# Patient Record
Sex: FEMALE | Race: WHITE | NOT HISPANIC OR LATINO | Employment: FULL TIME | URBAN - METROPOLITAN AREA
[De-identification: names, ages, dates, MRNs, and addresses within clinical notes are randomized per-mention and may not be internally consistent; named-entity substitution may affect disease eponyms.]

---

## 2017-04-25 ENCOUNTER — OFFICE VISIT (OUTPATIENT)
Dept: PHYSICAL MEDICINE AND REHAB | Facility: CLINIC | Age: 37
End: 2017-04-25
Payer: COMMERCIAL

## 2017-04-25 VITALS
HEART RATE: 88 BPM | BODY MASS INDEX: 36.65 KG/M2 | SYSTOLIC BLOOD PRESSURE: 156 MMHG | HEIGHT: 65 IN | WEIGHT: 220 LBS | RESPIRATION RATE: 18 BRPM | DIASTOLIC BLOOD PRESSURE: 101 MMHG

## 2017-04-25 DIAGNOSIS — R29.898 ARM WEAKNESS: ICD-10-CM

## 2017-04-25 DIAGNOSIS — M79.18 MYOFASCIAL PAIN: Primary | ICD-10-CM

## 2017-04-25 PROBLEM — Z86.69 HISTORY OF MIGRAINE HEADACHES: Status: ACTIVE | Noted: 2017-04-25

## 2017-04-25 PROBLEM — E66.9 OBESITY (BMI 30-39.9): Status: ACTIVE | Noted: 2017-04-25

## 2017-04-25 PROBLEM — K58.9 IBS (IRRITABLE BOWEL SYNDROME): Status: ACTIVE | Noted: 2017-04-25

## 2017-04-25 PROCEDURE — 99204 OFFICE O/P NEW MOD 45 MIN: CPT | Mod: S$GLB,,, | Performed by: PHYSICAL MEDICINE & REHABILITATION

## 2017-04-25 PROCEDURE — 99999 PR PBB SHADOW E&M-NEW PATIENT-LVL III: CPT | Mod: PBBFAC,,, | Performed by: PHYSICAL MEDICINE & REHABILITATION

## 2017-04-25 PROCEDURE — 1160F RVW MEDS BY RX/DR IN RCRD: CPT | Mod: S$GLB,,, | Performed by: PHYSICAL MEDICINE & REHABILITATION

## 2017-04-25 RX ORDER — TIZANIDINE 2 MG/1
2-4 TABLET ORAL NIGHTLY PRN
Qty: 60 TABLET | Refills: 1 | Status: SHIPPED | OUTPATIENT
Start: 2017-04-25 | End: 2017-06-01

## 2017-04-25 NOTE — MR AVS SNAPSHOT
[facility]  [address] [address]  Phone: [phone]  Fax: [phone]                  Niesha Hilton   2017 12:20 PM   Office Visit    Description:  Female : 1980   Provider:  Adriana Burgos MD   Department:  [facility] - Physiatry           Reason for Visit     Back Pain     Shoulder Pain           Diagnoses this Visit        Comments    Myofascial pain    -  Primary     Arm weakness                To Do List           Goals (5 Years of Data)     None      Follow-Up and Disposition     Return if symptoms worsen or fail to improve.       These Medications        Disp Refills Start End    tizanidine (ZANAFLEX) 2 MG tablet 60 tablet 1 2017     Take 1-2 tablets (2-4 mg total) by mouth nightly as needed. - Oral    Pharmacy: [facility] [address] [place] [address] [place], [place] - [address] AT [facility] #: [phone]         [facility]     [facility] Care [facility] -  Assistance  Unless otherwise directed by your provider, please contact [facility], our nurse care line that is available for  assistance.     Registered nurses in the [facility] provide: appointment scheduling, clinical advisement, health education, and other advisory services.  Call: [phone] (toll free)               Medications           Message regarding Medications     Verify the changes and/or additions to your medication regime listed below are the same as discussed with your clinician today.  If any of these changes or additions are incorrect, please notify your healthcare provider.        START taking these NEW medications        Refills    tizanidine (ZANAFLEX) 2 MG tablet 1    Sig: Take 1-2 tablets (2-4 mg total) by mouth nightly as needed.    Class: Normal    Route: Oral           Verify that the below list of medications is an accurate representation of the medications you are currently taking.  If none reported, the list may be blank.  "If incorrect, please contact your healthcare provider. Carry this list with you in case of emergency.           Current Medications     tizanidine (ZANAFLEX) 2 MG tablet Take 1-2 tablets (2-4 mg total) by mouth nightly as needed.           Clinical Reference Information           Your Vitals Were     Resp Height Weight BMI       18 5' 5" (1.651 m) 99.8 kg (220 lb) 36.61 kg/m2       Allergies as of 4/25/2017     No Known Allergies      Immunizations Administered on Date of Encounter - 4/25/2017     None      Orders Placed During Today's Visit      Normal Orders This Visit    Ambulatory Referral to Physical/Occupational Therapy       [facility] Sign[facility]Up     Activating your [facility] account is as easy as 1-2-3!     1) Visit [email or web address], select Sign Up Now, enter this activation code and your date of birth, then select Next.  [email or web address]  Expires: 6/9/2017 12:03 PM      2) Create a username and password to use when you visit [facility] in the future and select a security question in case you lose your password and select Next.    3) Enter your e-mail address and click Sign Up!    Additional Information  If you have questions, please e-mail [email or web address] or call [phone] to talk to our [facility] staff. Remember, [facility] is NOT to be used for urgent needs. For medical emergencies, dial 911.         Instructions      Myofascial Pain Syndrome: Fibrositis  Your pain is caused by a state of chronic muscle tension. This condition is called by various names: myofascial pain, fibrositis and trigger point pain. This can also be due to mechanical stress (such as working at a computer terminal for long periods; or work that requires repetitive motions of the arms or hands) or emotional stress (such as problems on the job or in your personal life). Sometimes there is no obvious cause. The pain can occur in the area of the muscle spasm or at a site distant to it. For example, spasm of a neck muscle can cause " headache. Spasm of the muscle near the shoulder blade can cause pain shooting down the arm.  Home Care:  · Try to identify the factors that may be causing your problem and change them:  ¨ If you feel that emotional stress is a cause of your pain, learn methods to deal more effectively with the stress in your life. These may include regular exercise, muscle relaxation techniques, meditation or simply taking time out for yourself. Consult your doctor or go to a local bookstore and review the many books and tapes available on the subject of stress reduction.  ¨ If you feel that physical stress is a cause for your pain, try to modify any poor work habits.  · You may use acetaminophen (Tylenol) or ibuprofen (Motrin, Advil) to control pain, unless another medicine was prescribed. [NOTE: If you have chronic liver or kidney disease or ever had a stomach ulcer or GI bleeding, talk with your doctor before using these medicines.]  · The use of heat to the muscle (hot compress or heating pad) will be helpful to reduce muscle spasm. Some persons get relief with ice packs. Apply an ice pack (crushed or cubed ice in a plastic bag, wrapped in a towel) for 20 minutes at a time as needed. Use the method that feels best to you.  · Massaging the trigger point and stretching out the muscle are an important parts of prevention and treatment. Trigger point massage can be done by first applying heat to the area to warm and prepare the muscle. Have someone apply steady thumb pressure directly on the knot in the muscle (the most tender point) for 30 seconds. Release the pressure, then massage the surrounding muscle. Repeat the process, applying more pressure to the trigger point each time. Do this up to the limit of pain. With each treatment, the trigger point should become less tender and the pain should decrease. You can apply local pressure to trigger points in the back by lying on the floor with a tennis ball under the trigger  point.  Follow Up  with your doctor as advised or if not improving within the next week. It may be necessary for you to receive physical therapy if you do not respond to home treatment alone.  Get Prompt Medical Attention  if any of the following occur:  · If your trigger point is in the chest muscles, observe for pain that becomes more severe, lasts longer, or spreads into your shoulder/arm, neck or back; you develop trouble breathing, sweating, nausea or vomiting in association with chest pain  · If you develop weakness or numbness in an extremity  · If your pain worsens, regardless of its location  © 2420-4293 [employer]. [address] [place] [address]. All rights reserved. This information is not intended as a substitute for professional medical care. Always follow your healthcare professional's instructions.          Trigger Point Injection  The cause of your muscle pain or spasms may be one or more trigger points. Your health care provider may decide to inject the painful spots to relax the muscle. This can help relieve your pain. Relaxing the muscle can also make movement easier. You may then be able to exercise to strengthen the muscle and help it heal.    What is a trigger point?  A trigger point is a tight, painful knot of muscle fiber. It can form where a muscle is strained or injured. The knot can sometimes be felt under the skin. A trigger point is very tender to the touch. Pain may also spread to other parts of the affected muscle. Muscles around a knee, shoulder blade, or other bones are prone to trigger points. This is because these muscles are more likely to be injured.    About the injections  Any muscle in the body can have one or more trigger points. Several injections may be needed in each trigger point to best relieve pain. These injections may be given in sessions about 2 weeks apart, depending on the preference of your health care provider. In some cases, you may  not feel much change in your symptoms until after the third injection.     © 2520-5201 [employer] [address] [place] [address]. All rights reserved. This information is not intended as a substitute for professional medical care. Always follow your healthcare professional's instructions.            Your Neck Muscles  The muscles in the neck and shoulders need to be strong to hold the neck and head in place. These muscles also help move the neck and shoulders. Your health care provider can recommend exercises to help stretch and strengthen your neck muscles.    © 0476-2527 [employer]. [address] [place] [address]. All rights reserved. This information is not intended as a substitute for professional medical care. Always follow your healthcare professional's instructions.          Neck Problems: Relieving Your Symptoms  The first goal of treatment is to relieve your symptoms. Your health care provider may recommend self-care treatments. These include resting, applying ice and heat, taking medication, and doing exercises. Your health care provider may also recommend that you see a physical therapist, who can teach you ways to care for and strengthen your neck.    Self-Care Treatments  Pain can end quickly or last awhile. Either way, youll want relief as soon as possible. Your health care provider can tell you which treatments to do at home to help relieve your pain.  · Lying down for a short time takes pressure from the head off the neck.  · Ice and heat can help reduce pain. To bring down swelling, rest an ice pack wrapped in a thin towel on your neck for 15 minutes. To relax sore muscles, apply a warm, wet towel to the area. Or take a warm bath or shower.  · Over-the-counter medications, such as ibuprofen, naproxen, and aspirin, can help reduce pain and swelling. Acetaminophen can help relieve pain. Use these only as directed.  · Exercises can relax muscles and  prevent stiffness. To prepare, drape a warm, wet towel around your neck and shoulders for 5 minutes. Remove the towel. Then do any exercises recommended to you by your health care provider.  Physical Therapy  If self-care treatments arent helping relieve neck pain, your health care provider may suggest one or more sessions of physical therapy. Physical therapy is performed by a specialist trained to treat injuries. Your physical therapist (PT) will teach you how to strengthen muscles, improve the spines alignment, and help you move properly. Treatment methods used in physical therapy may include:  · Heat. A special heating pad called a neck pack may be applied to your neck.  · Exercises. Your PT will teach you exercises to help strengthen your neck and improve its range of motion.  · Joint mobilization. The PT gently moves your vertebrae to help restore motion in your neck joints and reduce neck pain.  · Soft tissue mobilization. The PT massages and stretches the muscles in your neck and shoulders.  · Electrical stimulation. Electrical impulses are sent into your neck. This helps reduce soreness and inflammation.  · Education in body mechanics. The PT shows you ways to position and move your body that protect the neck.  Other Treatments  If physical therapy doesnt relieve your neck pain, your health care provider may suggest other treatments. For example, medications or injections can help relieve pain and swelling. In some cases, surgery may be needed to treat neck problems.  © 7311-2769 [employer]. [address] [place] [address], [place] [address]. All rights reserved. This information is not intended as a substitute for professional medical care. Always follow your healthcare professional's instructions.          Understanding Neck Problems       If you suffer from neck pain, youre not alone. Many people have neck pain at some point in their lives. Problems such as poor posture, injury, and wear and  tear can lead to neck pain. Your health care provider will work with you to find the treatment thats best for your neck.  Types of Neck Problems  The following problems can cause pain or injury in your neck:  · Strains and sprains: Strains (stretched or torn muscles) and sprains (stretched or torn ligaments) can cause neck pain. Strains and sprains can occur during an accident, or when you overuse your neck through repetitive motion. They can also cause your muscles and ligaments to become inflamed (swollen and painful).  · Whiplash and other injuries: Whiplash can result when an impact throws your head, forcing your neck too far forward (hyperflexion), then too far backward (hyperextension). When combined, the two motions can cause a painful injury to different parts of your neck, such as muscles, ligaments, or joints. The most common cause of whiplash is a car accident. But it can also happen during a fall or sports injury.  · Weakened disks: A simple action, such as a sneeze or a cough, can cause one of your disks to bulge (herniate). A herniated disk can put pressure on your nerve and cause pain. Over time, disks can also thin out (degenerate). Flattened disks dont cushion vertebrae well and can cause vertebrae to rub together. Rubbing vertebrae can pinch nerves and cause pain.  · Weakened joints: Aging and injury can cause joints to slowly degenerate. Thinned joints can also cause vertebrae to rub together. This can cause abnormal growths of bone (bone spurs) to form on vertebrae. Bone spurs put pressure on nerves, causing pain.  Common Symptoms  If you have a neck problem, you may have one or more of the following symptoms:  · Muscle tension and spasm: You may not be able to move your neck, arms, or shoulders comfortably if you have muscle tension or stiffness in your neck. If your symptoms arent relieved, you may experience muscle spasms, or knots of contracted tissue (trigger points) in areas of your neck  and shoulders.  · Aches and pains: Dull aches in your head or neck, sharp pains, and swelling of the soft tissue of your neck and shoulders are common symptoms. If theres pressure on the nerves in your neck, you may feel pain in your arms or hands (referred pain).  · Numbness or weakness: If you injure the nerves in your neck, you may experience numbness, tingling, or weakness in your shoulders, arms, or hands. These symptoms arise when disks or bone spurs press on the nerves in your neck.  © 3836-8438 [employer]. [address] [place] [address], [place] [address]. All rights reserved. This information is not intended as a substitute for professional medical care. Always follow your healthcare professional's instructions.          Neck Spasm [No Trauma]    Spasm of the neck muscles can occur after a sudden awkward neck movement. Sleeping with your neck in a crooked position can also cause spasm. Some persons respond to emotional stress by tensing the muscles of their neck, shoulders and upper back. If neck spasm lasts long enough, it can cause headache.  The treatment described below will usually help the pain to go away in 5-7 days. Pain that continues may require further evaluation or other types of treatment such as physical therapy.  Home Care:  1. Rest and relax the muscles. Use a comfortable pillow that supports the head and keeps the spine in a neutral position. The position of the head should not be tilted forward or backward. A rolled up towel may help for a custom fit.  2. Some persons find relief with heat (hot shower, hot bath or heating pad) and massage, while others prefer cold packs (crushed or cubed ice in a plastic bag, wrapped in a towel). Try both and use the method that feels best for 20 minutes several times a day.  3. You may use acetaminophen (Tylenol) or ibuprofen (Motrin, Advil) to control pain, unless another medicine was prescribed. [NOTE: If you have chronic liver or kidney disease  or ever had a stomach ulcer or GI bleeding, talk with your doctor before using these medicines.]  Follow Up  with your doctor or this facility if your symptoms do not show signs of improvement after one week. Physical therapy or further evaluation may be needed.  [NOTE: If x-rays were taken, they will be reviewed by a radiologist. You will be notified of any new findings that may affect your care.]  Return Promptly  or contact your doctor if any of the following occurs:  · Pain becomes worse or spreads into one or both arms  · Weakness or numbness in one or both arms  · Increasing headache with nausea or vomiting  · Fever over 100.4ºF (38.0ºC)  © 7761-6271 [employer]. [address] [place] [address]. All rights reserved. This information is not intended as a substitute for professional medical care. Always follow your healthcare professional's instructions.          Know Your Neck: The Cervical Spine  By learning about the parts of the neck, you can better understand your neck problem. The bones of the neck are called cervical vertebrae, commonly identified as C1 through C7. Together, they form a bony column called the spine. Vertebrae also protect the spinal cord, a pathway for messages to reach the brain. Surrounding the spine are soft tissues such as muscles, tendons, and nerves.        Flexibility Is Key  For the neck to function normally, it has to be flexible enough to move without discomfort. A healthy neck can move easily in six different directions.    © 1486-1097 [employer]. [address] [place] [address]. All rights reserved. This information is not intended as a substitute for professional medical care. Always follow your healthcare professional's instructions.          Protecting Your Neck: Posture and Body Mechanics  Protecting your neck from injuries and pain involves practicing good posture and body mechanics. This may mean correcting bad habits you  have related to the way you hold and move your body. The tips below can help you improve your posture and body mechanics.    What Is Posture and Why Does It Matter?  Posture is the way you hold your body. For many of us, this means hunching over, thrusting the chin forward, and slouching the shoulders. But this kind of poor posture keeps muscles from properly supporting the neck and puts stress on muscles, disks, ligaments, and joints in your neck. As a result, injury and pain can occur.  How Is Your Posture?  Use a full-length mirror to check your posture. To begin, stand normally. Then slowly back up against a wall. Is there space between your head and the wall? Do you slouch your shoulders? Is your chin pointing up or down? All these can cause neck pain and injury.  Improving Your Posture  Follow these steps to improve your posture:  · Pull your shoulders back.  · Think of the ears, shoulders, and hips as a series of dots. Now, adjust your body to connect the dots in a straight line.  · Keep your chin level.  What Are Body Mechanics and Why Do They Matter?  The way you move and position your body during daily activities is called body mechanics. Good body mechanics help protect the neck. This means learning the right ways to stand, sit, and even sleep. So do whats best for your neck and practice good body mechanics.  Standing   To protect your neck while standing:  · Carry objects close to your body.  · Keep your ears and shoulders in a line while standing or walking.  · To lower yourself, bend at the knees with a straight back. Do this instead of looking down and reaching for objects.  · Work at eye level. Dont reach above your head or tilt your head back.  Sitting   To protect your neck while sitting:  · Set up your workstation so your monitor is at eye level. Also, use a document zimmerman when viewing papers or books.  · Keep your knees at or slightly below the level of your hips.  · Sit up straight, with feet  flat on the floor. If your feet dont touch the floor, use a footrest.  · Avoid sitting or driving for long periods. Take frequent breaks.  Sleeping   To protect your neck while sleeping:  · Sleep on your back with a pillow under your knees, or on your side with a pillow between bent knees. This helps align the spine.  · Avoid using pillows that are too high or too low. Instead, use a neck roll or pillow under your neck while you sleep to keep the neck straight.  · Sleep on a mattress that supports you, with a pillow under your neck.  © 8854-3980 [employer]. [address] [place] [address], [place] [address]. All rights reserved. This information is not intended as a substitute for professional medical care. Always follow your healthcare professional's instructions.          Exercises at Your Workstation: Eyes, Neck, and Head     Tired eyes? Stiff neck? A few easy moves can help prevent these kinds of problems. Take a few minutes during your day to do these exercises--right at your desk. They'll loosen up your muscles, keep you more alert, and make a big difference in how you work and feel.    For your eyes  Eye cup  · Lean forward with your elbows on your desk.  · Cup your hands and place them lightly over your closed eyes. Hold for a minute, while breathing deeply in and out.  · Slowly uncover and open your eyes. Repeat 2 times.  Eye roll  · Close your eyes. Slowly roll your eyeballs clockwise all the way around. Repeat 3 times.  · Now slowly roll them all the way around counterclockwise. Repeat 3 times.  Eye rest  · Every 20 minutes, look away from the computer screen. Focus on an object at least 20 feet away. Stay focused on this object for a full 20 seconds.    For your neck and head  Warm-up  · Drop your head gently to your chest. While breathing in, slowly roll your head up to your left shoulder. While breathing out, slowly roll your head back to center. Repeat to the right.  · Repeat 3 times on each  side.  Head tilt  · Sit up straight. Tuck in your chin.  · Slowly tip your head to the left. Return to the center. Then, tip your head to the right.  · Repeat 3 times on each side.    Head turn  · Sit up straight.  · Slowly turn your head and look over your left shoulder. Hold for a few seconds. Go back to the center, then repeat to your right.  · Repeat 3 times on each side.  © 8895-0540 [employer]. [address] [place] [address]. All rights reserved. This information is not intended as a substitute for professional medical care. Always follow your healthcare professional's instructions.          Reach and Hold Exercise    Do this exercise on your hands and knees. Keep your knees under your hips and your hands under your shoulders. Keep your spine in a neutral position (not arched or sagging). Keep your ears in line with your shoulders. Hold for a few seconds before starting the exercise:  4. Tighten your abdominal muscles and raise one arm straight in front of you, palm down. Hold for 5 seconds, then lower. Repeat 5 times.  5. Do the exercise again, this time lifting your arm to the side. Repeat 5 times.  6. Do the exercise again, this time lifting your arm backward, palm up. Repeat 5 times.  Switch sides and do each exercise with the other arm.  © 2014-3584 [employer]. [address] [place] [address]. All rights reserved. This information is not intended as a substitute for professional medical care. Always follow your healthcare professional's instructions.        Shoulder and Upper Back Stretch  To start, stand tall with your ears, shoulders, and hips in line. Your feet should be slightly apart, positioned just under your hips. Focus your eyes directly in front of you.  this position for a few seconds before starting your exercise. This helps increase your awareness of proper posture.  Reach overhead and slightly back with both arms. Keep your shoulders  and neck aligned and your elbows behind your shoulders:  · With your palms facing the ceiling, turn your fingers inward.  · Take a deep breath. Breathe out, and lower your elbows toward your buttocks. Hold for 5 seconds, then return to starting position.  · Repeat 3 times.    © 1693-2204 [employer]. [address] [place] [address]. All rights reserved. This information is not intended as a substitute for professional medical care. Always follow your healthcare professional's instructions.          Shoulder Clock Exercise  To start, stand tall with your ears, shoulders, and hips in line. Your feet should be slightly apart, positioned just under your hips. Focus your eyes directly in front of you.  this position for a few seconds before starting your exercise. This helps increase your awareness of proper posture.  · Imagine that your right shoulder is the center of a clock. With the outer point of your shoulder, roll it around to slowly trace the outer edge of the clock.  · Move clockwise first, then counterclockwise.  · Repeat 3 times. Switch shoulders.   © 3649-1853 [employer]. [address] [place] [address]. All rights reserved. This information is not intended as a substitute for professional medical care. Always follow your healthcare professional's instructions.          Shoulder Girdle Stretch     To start, sit in a chair with your feet flat on the floor. Your weight should be slightly forward so that youre balanced evenly on your buttocks. Relax your shoulders and keep your head level. Using a chair with arms may help you keep your balance:  · Place 1 hand on the outside elbow of the other arm.  · Pull the arm across your body. Hold for 30 to 60 seconds. Repeat once.  · Switch sides.    © 6304-4979 [employer]. [address] [place] [address]. All rights reserved. This information is not intended as a substitute for  professional medical care. Always follow your healthcare professional's instructions.          Shoulder Exercises      To start, sit in a chair with your feet flat on the floor. Your weight should be slightly forward so that youre balanced evenly on your buttocks. Relax your shoulders and keep your head level. Avoid arching your back or rounding your shoulders. Using a chair with arms may help you keep your balance.  · Raise your arms, elbows bent, to shoulder height.  · Slowly move your forearms together. Hold for 5 seconds.  · Return to starting position. Repeat 5 times.  © 4076-6392 [employer]. [address] [place] [address]. All rights reserved. This information is not intended as a substitute for professional medical care. Always follow your healthcare professional's instructions.        Shoulder Shrug Exercise  To start, sit in a chair with your feet flat on the floor. Shift your weight slightly forward to avoid rounding your back. Relax. Keep your ears, shoulders, and hips aligned:  · Raise both of your shoulders as high as you can, as if you were trying to touch them to your ears. Keep your head and neck still and relaxed.  · Hold for a count of 10. Release.  · Repeat 5 times.    © 0626-2480 [employer]. [address] [place] [address]. All rights reserved. This information is not intended as a substitute for professional medical care. Always follow your healthcare professional's instructions.          Shoulder Squeeze Exercise     To start, sit in a chair with your feet flat on the floor. Shift your weight slightly forward to avoid rounding your back. Relax. Keep your ears, shoulders, and hips aligned:  · Raise your arms to shoulder height, elbows bent and palms forward.  · Move your arms back, squeezing your shoulder blades together.  · Hold for 10 seconds. Return to starting position.   · Repeat 5 times.     © 2000-2015 [employer]. [facility] [place] [address]. All rights reserved. This information is not intended as a substitute for professional medical care. Always follow your healthcare professional's instructions.        Weight Management: Exercise and Activity  Studies show that people who exercise are the most likely to lose weight and keep it off. Exercise burns calories. It helps build muscle to make your body stronger. Make exercise an important part of your weight-management plan.    Make activity part of your day  You may not think you have the time to exercise. But you can work activity into your daily life--you just need to be committed. Take 10 minutes out of your lunch hour to take a walk. Walk to the [employer] to get your paper instead of having it delivered. Make it a habit to take the stairs instead of the elevator. Park in a far away parking spot instead of the closest. Youll be surprised at how fast these little changes can make a difference.  Some people really cannot walk very far, and tire out quickly with exercise. Instead of becoming discouraged, resolve to do what you can do, and work to make that a regular frequent habit.   The benefits of exercise  Exercise offers many benefits including:   · Exercise increases your metabolism (the speed at which your body burns calories).  · Regular exercise can increase the amount of muscle in your body. Muscle burns calories faster than fat. The more muscle you have, the more calories you burn.  · Exercise gives you energy and curbs your appetite.  · Exercise decreases stress and helps you sleep better.  Make exercise fun  Exercise can be fun. Choose an activity you enjoy. You may even get a friend to do it with you:  · Take a resistance-training or aerobics class  · Join a team sport  · Take a dance class  · Walk the dog  · Ride a bike  If you have health problems, be sure to ask your healthcare provider before you start an exercise program. Have a   help you develop a plan thats safe for you.   Date Last Reviewed: 2/4/2016  © 9300-0401 [employer]. [address] [place] [address], [place] [address]. All rights reserved. This information is not intended as a substitute for professional medical care. Always follow your healthcare professional's instructions.        Weight Management: Healthy Eating  Food is your bodys fuel. You cant live without it. The key is to give your body enough nutrients and energy without eating too much. Reading food labels can help you make healthy choices. Also, learn new eating habits to manage your weight.     All the values on the label are based on one serving. The serving size is the average portion. Remember to multiply the values on the label by the number of servings you eat.   Eat less fat  A gram of fat has almost 2.5 times the calories of a gram of protein or carbohydrates. Try to balance your food choices so that only 20% to 35% of your calories comes from total fat. This means an average of 2½ to 3½ grams of fat for each 100 calories you eat.  Eat more fiber  High-fiber foods are digested more slowly than low-fiber foods, so you feel full longer. Try to get at least 25 grams of fiber each day for a 2000 calorie diet. Foods high in fiber include:  · Vegetables and fruits  · Whole-grain or bran breads, pastas, and cereals  · Legumes (beans) and peas  As you begin to eat more fiber, be sure to drink plenty of water to keep your digestive system working smoothly.  Tips  Do's and don'ts include:   · Dont skip meals. This often leads to overeating later on. Its best to spread your eating throughout the day.  · Eat a variety of foods, not just a few favorites.  · If you find yourself eating when youre not hungry, ask yourself why. Many of us eat when were bored, stressed, or just to be polite. Listen to your body. If youre not hungry, get busy doing something else instead of eating.  · Eat slower, shooting for 20 to  30 minutes for each meal. It takes 20 minutes for your stomach to tell your brain that its full. Slow eaters tend to eat less and are still satisfied, while fast eaters may tend to be overeaters.   · Pay attention to what you eat. Dont read or watch TV during your meal.  Date Last Reviewed: 1/31/2016 © 2000-2016 [employer]. [address] [place] [address], [place] [address]. All rights reserved. This information is not intended as a substitute for professional medical care. Always follow your healthcare professional's instructions.        Weight Management: Overcoming Your Barriers  You may have many reasons why youre not ready to lose weight. You may not feel you have the time or the skills. You may be afraid of losing weight and gaining it back again. Well, you can lose weight. And you can keep the weight off, if you make changes slowly and stick with them. Remember that you may never find the perfect time to lose weight. Decide that the right time to be healthier is now.    Common barriers  Barrier 1: I dont want to deny myself.  Barrier Buster: You dont have to! Moderation is the key:  · Watch portion sizes and know when you're eating more than one serving.  · Plan to ask for a doggy bag when you eat out.  · Have just one.  · Choose lower-fat and lower-calorie versions of your favorites.  · Use a small plate instead of a normal-sized plate.   Barrier 2: I lost weight before but I gained it right back.  Barrier Buster: Make this time different:  · List what worked and didnt work last time and what you can try this time.  · Choose changes that you are willing to stick with.  · Work exercise into your weight-loss plan.  · Be realistic about what is possible. Your plan has to fit into your life in a balanced way that works for you.   Barrier 3: I dont have the time to be active.  Barrier Buster: It takes just a few minutes a day!  · Be active with a pet or the kids.  · Block off activity time in your  schedule.  · Borrow some time that you usually spend watching TV.  · You are too important not to take time to exercise--it is your life!   Feel good about yourself  Do you eat more because you feel bad about yourself, then feel even worse as you gain weight? This is a vicious cycle. Breaking this cycle is not easy. You may need group support or counseling. Always remember that you are a valuable person, no matter what size or shape you are.  Do you have a health problem? If so, dont use it as an excuse for not losing weight. Ask your healthcare provider or dietitian about methods to lose weight that are safe for you. For example, even if you have severe arthritis, it may be easier for you to exercise in a pool. Get advice from a .    Date Last Reviewed: 2/2/2016 © 2000-2016 [employer]. [address] [place] [address], [place] [address]. All rights reserved. This information is not intended as a substitute for professional medical care. Always follow your healthcare professional's instructions.        Weight Management: Getting Started  Healthy bodies come in all shapes and sizes. Not all bodies are made to be thin. For some people, a healthy weight is higher than the average weight listed on weight charts. Your healthcare provider can help you decide on a healthy weight for you.    Reasons to lose weight  Losing weight can help with some health problems, such as high blood pressure, heart disease, diabetes, sleep apnea, and arthritis. You may also feel more energy.  Set your long-term goal  Your goal doesn't even have to be a specific weight. You may decide on a fitness goal (such as being able to walk 10 miles a week), or a health goal (such as lowering your blood pressure). Choose a goal that is measurable and reasonable, so you know when you've reached it. A goal of reaching a BMI of less than 25 is not always reasonable (or possible).   Make an action plan  Habits dont change  overnight. Setting your goals too high can leave you feeling discouraged if you cant reach them. Be realistic. Choose one or two small changes you can make now. Set an action plan for how you are going to make these changes. When you can stick to this plan, keep making a few more small changes. Taking small steps will help you stay on the path to success.  Track your progress  Write down your goals. Then, keep a daily record of your progress. Write down what you eat and how active you are. This record lets you look back on how much youve done. It may also help when youre feeling frustrated. Reward yourself for success. Even if you dont reach every goal, give yourself credit for what you do get done.  Get support  Encouragement from others can help make losing weight easier. Ask your family members and friends for support. They may even want to join you. Also look to your healthcare provider, registered dietitian, and  for help. Your local hospital can give you more information about nutrition, exercise, and weight loss.  Date Last Reviewed: 1/31/2016  © 9012-2322 [employer]. [address] [place] [address]. All rights reserved. This information is not intended as a substitute for professional medical care. Always follow your healthcare professional's instructions.        Weight Management: Fact and Fiction  Knowing the truth about losing weight can help you separate what works from what doesnt. Dont be taken in by expensive weight-loss fads like pills, herbs, and special foods that promise unbelievable results. Theres no magic way to lose weight. If you have questions about weight loss, ask your healthcare provider.    Fiction: The faster I lose weight, the better.  Fact: Rapid weight loss is usually due to loss of water or muscle mass. What youre trying to get rid of is extra fat. Aim to lose a 1/2 pound to 2 pounds a week. Then youre more likely to lose fat  rather than water or muscle.  Fiction: Skipping meals will help me lose weight.  Fact: When you skip meals, you dont give your body the energy it needs to work. Hunger makes you more likely to overeat later on. Its best to spread your meals throughout the day. Eat at least three meals a day.  Fiction: I cant start exercising until I lose weight.  Fact: The sooner you start exercising the better. Exercise helps burn more calories, tone your muscles, and keep your appetite in check. People who continue to exercise after they lose weight are more likely to keep the weight off.  Fiction: The fewer calories I eat, the better.  Fact: This seems like it should be true, but its not. When you eat too few calories, your body acts as if its on a desert island. It thinks food is scarce, so it slows down your metabolism (how fast you burn calories) to save energy. By eating too few calories, you make it harder to lose weight.  Fiction: Once I lose weight, I can go back to living the way I did before.  Fact: Going back to your old eating habits and giving up exercise is a sure way to regain any weight youve lost. The lifestyle changes that help you lose extra weight can also help keep it off. This is why you need to make realistic changes you can stick with.  Fiction: Low-fat and fat-free mean low-calorie.  Fact: All foods, even fat-free ones, have calories. Eat too many calories and youll gain weight. Its OK to treat yourself to a fat-free cookie or two. Just dont eat the whole box! A dietitian will help you figure this out, and will likely recommend that you eat three meals a day, with protein with each meal.   Date Last Reviewed: 2/4/2016  © 6445-0786 [employer]. [address] [place] [address], [place] [address]. All rights reserved. This information is not intended as a substitute for professional medical care. Always follow your healthcare professional's instructions.             Smoking Cessation      If you would like to quit smoking:   You may be eligible for free services if you are a [place] resident and started smoking cigarettes before September 1, 1988.  Call the Smoking Cessation [employer] (SCT) toll free at (088) 028-8936 or (913) 017-2709.   Call 1-800-QUIT-NOW if you do not meet the above criteria.   Contact us via email: [email or web address]   View our website for more information: [email or web address]        Language Assistance Services     ATTENTION: Language assistance services are available, free of charge. Please call [phone].      ATENCIÓN: Si habla español, tiene a candelario disposición servicios gratuitos de asistencia lingüística. Llame al [phone].     CHÚ Ý: N?u b?n nói Ti?ng Vi?t, có các d?ch v? h? tr? ngôn ng? mi?n phí dành cho b?n. G?i s? [phone].         [facility] complies with applicable Federal civil rights laws and does not discriminate on the basis of race, color, national origin, age, disability, or sex.

## 2017-04-25 NOTE — PATIENT INSTRUCTIONS
Myofascial Pain Syndrome: Fibrositis  Your pain is caused by a state of chronic muscle tension. This condition is called by various names: myofascial pain, fibrositis and trigger point pain. This can also be due to mechanical stress (such as working at a computer terminal for long periods; or work that requires repetitive motions of the arms or hands) or emotional stress (such as problems on the job or in your personal life). Sometimes there is no obvious cause. The pain can occur in the area of the muscle spasm or at a site distant to it. For example, spasm of a neck muscle can cause headache. Spasm of the muscle near the shoulder blade can cause pain shooting down the arm.  Home Care:  · Try to identify the factors that may be causing your problem and change them:  ¨ If you feel that emotional stress is a cause of your pain, learn methods to deal more effectively with the stress in your life. These may include regular exercise, muscle relaxation techniques, meditation or simply taking time out for yourself. Consult your doctor or go to a local bookstore and review the many books and tapes available on the subject of stress reduction.  ¨ If you feel that physical stress is a cause for your pain, try to modify any poor work habits.  · You may use acetaminophen (Tylenol) or ibuprofen (Motrin, Advil) to control pain, unless another medicine was prescribed. [NOTE: If you have chronic liver or kidney disease or ever had a stomach ulcer or GI bleeding, talk with your doctor before using these medicines.]  · The use of heat to the muscle (hot compress or heating pad) will be helpful to reduce muscle spasm. Some persons get relief with ice packs. Apply an ice pack (crushed or cubed ice in a plastic bag, wrapped in a towel) for 20 minutes at a time as needed. Use the method that feels best to you.  · Massaging the trigger point and stretching out the muscle are an important parts of prevention and treatment. Trigger point  massage can be done by first applying heat to the area to warm and prepare the muscle. Have someone apply steady thumb pressure directly on the knot in the muscle (the most tender point) for 30 seconds. Release the pressure, then massage the surrounding muscle. Repeat the process, applying more pressure to the trigger point each time. Do this up to the limit of pain. With each treatment, the trigger point should become less tender and the pain should decrease. You can apply local pressure to trigger points in the back by lying on the floor with a tennis ball under the trigger point.  Follow Up  with your doctor as advised or if not improving within the next week. It may be necessary for you to receive physical therapy if you do not respond to home treatment alone.  Get Prompt Medical Attention  if any of the following occur:  · If your trigger point is in the chest muscles, observe for pain that becomes more severe, lasts longer, or spreads into your shoulder/arm, neck or back; you develop trouble breathing, sweating, nausea or vomiting in association with chest pain  · If you develop weakness or numbness in an extremity  · If your pain worsens, regardless of its location  © 3218-3543 [employer]. [address] [place] [address]. All rights reserved. This information is not intended as a substitute for professional medical care. Always follow your healthcare professional's instructions.          Trigger Point Injection  The cause of your muscle pain or spasms may be one or more trigger points. Your health care provider may decide to inject the painful spots to relax the muscle. This can help relieve your pain. Relaxing the muscle can also make movement easier. You may then be able to exercise to strengthen the muscle and help it heal.    What is a trigger point?  A trigger point is a tight, painful knot of muscle fiber. It can form where a muscle is strained or injured. The knot can  sometimes be felt under the skin. A trigger point is very tender to the touch. Pain may also spread to other parts of the affected muscle. Muscles around a knee, shoulder blade, or other bones are prone to trigger points. This is because these muscles are more likely to be injured.    About the injections  Any muscle in the body can have one or more trigger points. Several injections may be needed in each trigger point to best relieve pain. These injections may be given in sessions about 2 weeks apart, depending on the preference of your health care provider. In some cases, you may not feel much change in your symptoms until after the third injection.     © 2099-8899 [employer]. [address] [place] [address]. All rights reserved. This information is not intended as a substitute for professional medical care. Always follow your healthcare professional's instructions.            Your Neck Muscles  The muscles in the neck and shoulders need to be strong to hold the neck and head in place. These muscles also help move the neck and shoulders. Your health care provider can recommend exercises to help stretch and strengthen your neck muscles.    © 8085-8727 [employer]. [address] [place] [address]. All rights reserved. This information is not intended as a substitute for professional medical care. Always follow your healthcare professional's instructions.          Neck Problems: Relieving Your Symptoms  The first goal of treatment is to relieve your symptoms. Your health care provider may recommend self-care treatments. These include resting, applying ice and heat, taking medication, and doing exercises. Your health care provider may also recommend that you see a physical therapist, who can teach you ways to care for and strengthen your neck.    Self-Care Treatments  Pain can end quickly or last awhile. Either way, youll want relief as soon as possible. Your health  care provider can tell you which treatments to do at home to help relieve your pain.  · Lying down for a short time takes pressure from the head off the neck.  · Ice and heat can help reduce pain. To bring down swelling, rest an ice pack wrapped in a thin towel on your neck for 15 minutes. To relax sore muscles, apply a warm, wet towel to the area. Or take a warm bath or shower.  · Over-the-counter medications, such as ibuprofen, naproxen, and aspirin, can help reduce pain and swelling. Acetaminophen can help relieve pain. Use these only as directed.  · Exercises can relax muscles and prevent stiffness. To prepare, drape a warm, wet towel around your neck and shoulders for 5 minutes. Remove the towel. Then do any exercises recommended to you by your health care provider.  Physical Therapy  If self-care treatments arent helping relieve neck pain, your health care provider may suggest one or more sessions of physical therapy. Physical therapy is performed by a specialist trained to treat injuries. Your physical therapist (PT) will teach you how to strengthen muscles, improve the spines alignment, and help you move properly. Treatment methods used in physical therapy may include:  · Heat. A special heating pad called a neck pack may be applied to your neck.  · Exercises. Your PT will teach you exercises to help strengthen your neck and improve its range of motion.  · Joint mobilization. The PT gently moves your vertebrae to help restore motion in your neck joints and reduce neck pain.  · Soft tissue mobilization. The PT massages and stretches the muscles in your neck and shoulders.  · Electrical stimulation. Electrical impulses are sent into your neck. This helps reduce soreness and inflammation.  · Education in body mechanics. The PT shows you ways to position and move your body that protect the neck.  Other Treatments  If physical therapy doesnt relieve your neck pain, your health care provider may suggest other  treatments. For example, medications or injections can help relieve pain and swelling. In some cases, surgery may be needed to treat neck problems.  © 2851-5024 [employer]. [address] [place] [address], [place] [address]. All rights reserved. This information is not intended as a substitute for professional medical care. Always follow your healthcare professional's instructions.          Understanding Neck Problems       If you suffer from neck pain, youre not alone. Many people have neck pain at some point in their lives. Problems such as poor posture, injury, and wear and tear can lead to neck pain. Your health care provider will work with you to find the treatment thats best for your neck.  Types of Neck Problems  The following problems can cause pain or injury in your neck:  · Strains and sprains: Strains (stretched or torn muscles) and sprains (stretched or torn ligaments) can cause neck pain. Strains and sprains can occur during an accident, or when you overuse your neck through repetitive motion. They can also cause your muscles and ligaments to become inflamed (swollen and painful).  · Whiplash and other injuries: Whiplash can result when an impact throws your head, forcing your neck too far forward (hyperflexion), then too far backward (hyperextension). When combined, the two motions can cause a painful injury to different parts of your neck, such as muscles, ligaments, or joints. The most common cause of whiplash is a car accident. But it can also happen during a fall or sports injury.  · Weakened disks: A simple action, such as a sneeze or a cough, can cause one of your disks to bulge (herniate). A herniated disk can put pressure on your nerve and cause pain. Over time, disks can also thin out (degenerate). Flattened disks dont cushion vertebrae well and can cause vertebrae to rub together. Rubbing vertebrae can pinch nerves and cause pain.  · Weakened joints: Aging and injury can cause joints  to slowly degenerate. Thinned joints can also cause vertebrae to rub together. This can cause abnormal growths of bone (bone spurs) to form on vertebrae. Bone spurs put pressure on nerves, causing pain.  Common Symptoms  If you have a neck problem, you may have one or more of the following symptoms:  · Muscle tension and spasm: You may not be able to move your neck, arms, or shoulders comfortably if you have muscle tension or stiffness in your neck. If your symptoms arent relieved, you may experience muscle spasms, or knots of contracted tissue (trigger points) in areas of your neck and shoulders.  · Aches and pains: Dull aches in your head or neck, sharp pains, and swelling of the soft tissue of your neck and shoulders are common symptoms. If theres pressure on the nerves in your neck, you may feel pain in your arms or hands (referred pain).  · Numbness or weakness: If you injure the nerves in your neck, you may experience numbness, tingling, or weakness in your shoulders, arms, or hands. These symptoms arise when disks or bone spurs press on the nerves in your neck.  © 8121-4626 [employer]. [address] [place] [address]. All rights reserved. This information is not intended as a substitute for professional medical care. Always follow your healthcare professional's instructions.          Neck Spasm [No Trauma]    Spasm of the neck muscles can occur after a sudden awkward neck movement. Sleeping with your neck in a crooked position can also cause spasm. Some persons respond to emotional stress by tensing the muscles of their neck, shoulders and upper back. If neck spasm lasts long enough, it can cause headache.  The treatment described below will usually help the pain to go away in 5-7 days. Pain that continues may require further evaluation or other types of treatment such as physical therapy.  Home Care:  1. Rest and relax the muscles. Use a comfortable pillow that supports the head  and keeps the spine in a neutral position. The position of the head should not be tilted forward or backward. A rolled up towel may help for a custom fit.  2. Some persons find relief with heat (hot shower, hot bath or heating pad) and massage, while others prefer cold packs (crushed or cubed ice in a plastic bag, wrapped in a towel). Try both and use the method that feels best for 20 minutes several times a day.  3. You may use acetaminophen (Tylenol) or ibuprofen (Motrin, Advil) to control pain, unless another medicine was prescribed. [NOTE: If you have chronic liver or kidney disease or ever had a stomach ulcer or GI bleeding, talk with your doctor before using these medicines.]  Follow Up  with your doctor or this facility if your symptoms do not show signs of improvement after one week. Physical therapy or further evaluation may be needed.  [NOTE: If x-rays were taken, they will be reviewed by a radiologist. You will be notified of any new findings that may affect your care.]  Return Promptly  or contact your doctor if any of the following occurs:  · Pain becomes worse or spreads into one or both arms  · Weakness or numbness in one or both arms  · Increasing headache with nausea or vomiting  · Fever over 100.4ºF (38.0ºC)  © 4388-0309 [employer]. [address] [place] [address], [place] [address]. All rights reserved. This information is not intended as a substitute for professional medical care. Always follow your healthcare professional's instructions.          Know Your Neck: The Cervical Spine  By learning about the parts of the neck, you can better understand your neck problem. The bones of the neck are called cervical vertebrae, commonly identified as C1 through C7. Together, they form a bony column called the spine. Vertebrae also protect the spinal cord, a pathway for messages to reach the brain. Surrounding the spine are soft tissues such as muscles, tendons, and nerves.        Flexibility Is  Key  For the neck to function normally, it has to be flexible enough to move without discomfort. A healthy neck can move easily in six different directions.    © 4395-7351 [employer]. [address] [place] [address], [place] [address]. All rights reserved. This information is not intended as a substitute for professional medical care. Always follow your healthcare professional's instructions.          Protecting Your Neck: Posture and Body Mechanics  Protecting your neck from injuries and pain involves practicing good posture and body mechanics. This may mean correcting bad habits you have related to the way you hold and move your body. The tips below can help you improve your posture and body mechanics.    What Is Posture and Why Does It Matter?  Posture is the way you hold your body. For many of us, this means hunching over, thrusting the chin forward, and slouching the shoulders. But this kind of poor posture keeps muscles from properly supporting the neck and puts stress on muscles, disks, ligaments, and joints in your neck. As a result, injury and pain can occur.  How Is Your Posture?  Use a full-length mirror to check your posture. To begin, stand normally. Then slowly back up against a wall. Is there space between your head and the wall? Do you slouch your shoulders? Is your chin pointing up or down? All these can cause neck pain and injury.  Improving Your Posture  Follow these steps to improve your posture:  · Pull your shoulders back.  · Think of the ears, shoulders, and hips as a series of dots. Now, adjust your body to connect the dots in a straight line.  · Keep your chin level.  What Are Body Mechanics and Why Do They Matter?  The way you move and position your body during daily activities is called body mechanics. Good body mechanics help protect the neck. This means learning the right ways to stand, sit, and even sleep. So do whats best for your neck and practice good body  mechanics.  Standing   To protect your neck while standing:  · Carry objects close to your body.  · Keep your ears and shoulders in a line while standing or walking.  · To lower yourself, bend at the knees with a straight back. Do this instead of looking down and reaching for objects.  · Work at eye level. Dont reach above your head or tilt your head back.  Sitting   To protect your neck while sitting:  · Set up your workstation so your monitor is at eye level. Also, use a document zimmerman when viewing papers or books.  · Keep your knees at or slightly below the level of your hips.  · Sit up straight, with feet flat on the floor. If your feet dont touch the floor, use a footrest.  · Avoid sitting or driving for long periods. Take frequent breaks.  Sleeping   To protect your neck while sleeping:  · Sleep on your back with a pillow under your knees, or on your side with a pillow between bent knees. This helps align the spine.  · Avoid using pillows that are too high or too low. Instead, use a neck roll or pillow under your neck while you sleep to keep the neck straight.  · Sleep on a mattress that supports you, with a pillow under your neck.  © 4456-8117 [employer]. [address] [place] [address], [place] [address]. All rights reserved. This information is not intended as a substitute for professional medical care. Always follow your healthcare professional's instructions.          Exercises at Your Workstation: Eyes, Neck, and Head     Tired eyes? Stiff neck? A few easy moves can help prevent these kinds of problems. Take a few minutes during your day to do these exercises--right at your desk. They'll loosen up your muscles, keep you more alert, and make a big difference in how you work and feel.    For your eyes  Eye cup  · Lean forward with your elbows on your desk.  · Cup your hands and place them lightly over your closed eyes. Hold for a minute, while breathing deeply in and out.  · Slowly uncover and  open your eyes. Repeat 2 times.  Eye roll  · Close your eyes. Slowly roll your eyeballs clockwise all the way around. Repeat 3 times.  · Now slowly roll them all the way around counterclockwise. Repeat 3 times.  Eye rest  · Every 20 minutes, look away from the computer screen. Focus on an object at least 20 feet away. Stay focused on this object for a full 20 seconds.    For your neck and head  Warm-up  · Drop your head gently to your chest. While breathing in, slowly roll your head up to your left shoulder. While breathing out, slowly roll your head back to center. Repeat to the right.  · Repeat 3 times on each side.  Head tilt  · Sit up straight. Tuck in your chin.  · Slowly tip your head to the left. Return to the center. Then, tip your head to the right.  · Repeat 3 times on each side.    Head turn  · Sit up straight.  · Slowly turn your head and look over your left shoulder. Hold for a few seconds. Go back to the center, then repeat to your right.  · Repeat 3 times on each side.  © 1516-3979 [employer], [employer]. [address] [place], [place] [id number]. All rights reserved. This information is not intended as a substitute for professional medical care. Always follow your healthcare professional's instructions.          Reach and Hold Exercise    Do this exercise on your hands and knees. Keep your knees under your hips and your hands under your shoulders. Keep your spine in a neutral position (not arched or sagging). Keep your ears in line with your shoulders. Hold for a few seconds before starting the exercise:  4. Tighten your abdominal muscles and raise one arm straight in front of you, palm down. Hold for 5 seconds, then lower. Repeat 5 times.  5. Do the exercise again, this time lifting your arm to the side. Repeat 5 times.  6. Do the exercise again, this time lifting your arm backward, palm up. Repeat 5 times.  Switch sides and do each exercise with the other arm.  © [id number] [employer],  [employer]. [address] [place] [address]. All rights reserved. This information is not intended as a substitute for professional medical care. Always follow your healthcare professional's instructions.        Shoulder and Upper Back Stretch  To start, stand tall with your ears, shoulders, and hips in line. Your feet should be slightly apart, positioned just under your hips. Focus your eyes directly in front of you.  this position for a few seconds before starting your exercise. This helps increase your awareness of proper posture.  Reach overhead and slightly back with both arms. Keep your shoulders and neck aligned and your elbows behind your shoulders:  · With your palms facing the ceiling, turn your fingers inward.  · Take a deep breath. Breathe out, and lower your elbows toward your buttocks. Hold for 5 seconds, then return to starting position.  · Repeat 3 times.    © 1124-3171 [employer]. [address] [place] [address]. All rights reserved. This information is not intended as a substitute for professional medical care. Always follow your healthcare professional's instructions.          Shoulder Clock Exercise  To start, stand tall with your ears, shoulders, and hips in line. Your feet should be slightly apart, positioned just under your hips. Focus your eyes directly in front of you.  this position for a few seconds before starting your exercise. This helps increase your awareness of proper posture.  · Imagine that your right shoulder is the center of a clock. With the outer point of your shoulder, roll it around to slowly trace the outer edge of the clock.  · Move clockwise first, then counterclockwise.  · Repeat 3 times. Switch shoulders.   © 9054-1240 [employer]. [address] [place] [address]. All rights reserved. This information is not intended as a substitute for professional medical care. Always follow your healthcare  professional's instructions.          Shoulder Girdle Stretch     To start, sit in a chair with your feet flat on the floor. Your weight should be slightly forward so that youre balanced evenly on your buttocks. Relax your shoulders and keep your head level. Using a chair with arms may help you keep your balance:  · Place 1 hand on the outside elbow of the other arm.  · Pull the arm across your body. Hold for 30 to 60 seconds. Repeat once.  · Switch sides.    © 9177-1009 [employer]. [address] [place] [address]. All rights reserved. This information is not intended as a substitute for professional medical care. Always follow your healthcare professional's instructions.          Shoulder Exercises      To start, sit in a chair with your feet flat on the floor. Your weight should be slightly forward so that youre balanced evenly on your buttocks. Relax your shoulders and keep your head level. Avoid arching your back or rounding your shoulders. Using a chair with arms may help you keep your balance.  · Raise your arms, elbows bent, to shoulder height.  · Slowly move your forearms together. Hold for 5 seconds.  · Return to starting position. Repeat 5 times.  © 3834-8267 [employer]. [address] [place] [address]. All rights reserved. This information is not intended as a substitute for professional medical care. Always follow your healthcare professional's instructions.        Shoulder Shrug Exercise  To start, sit in a chair with your feet flat on the floor. Shift your weight slightly forward to avoid rounding your back. Relax. Keep your ears, shoulders, and hips aligned:  · Raise both of your shoulders as high as you can, as if you were trying to touch them to your ears. Keep your head and neck still and relaxed.  · Hold for a count of 10. Release.  · Repeat 5 times.    © 2373-6966 [employer]. [address] [place] [address]. All rights  reserved. This information is not intended as a substitute for professional medical care. Always follow your healthcare professional's instructions.          Shoulder Squeeze Exercise     To start, sit in a chair with your feet flat on the floor. Shift your weight slightly forward to avoid rounding your back. Relax. Keep your ears, shoulders, and hips aligned:  · Raise your arms to shoulder height, elbows bent and palms forward.  · Move your arms back, squeezing your shoulder blades together.  · Hold for 10 seconds. Return to starting position.   · Repeat 5 times.     © 0802-1796 [employer]. [address] [place] [address]. All rights reserved. This information is not intended as a substitute for professional medical care. Always follow your healthcare professional's instructions.        Weight Management: Exercise and Activity  Studies show that people who exercise are the most likely to lose weight and keep it off. Exercise burns calories. It helps build muscle to make your body stronger. Make exercise an important part of your weight-management plan.    Make activity part of your day  You may not think you have the time to exercise. But you can work activity into your daily life--you just need to be committed. Take 10 minutes out of your lunch hour to take a walk. Walk to the [employer] to get your paper instead of having it delivered. Make it a habit to take the stairs instead of the elevator. Park in a far away parking spot instead of the closest. Youll be surprised at how fast these little changes can make a difference.  Some people really cannot walk very far, and tire out quickly with exercise. Instead of becoming discouraged, resolve to do what you can do, and work to make that a regular frequent habit.   The benefits of exercise  Exercise offers many benefits including:   · Exercise increases your metabolism (the speed at which your body burns calories).  · Regular exercise can  increase the amount of muscle in your body. Muscle burns calories faster than fat. The more muscle you have, the more calories you burn.  · Exercise gives you energy and curbs your appetite.  · Exercise decreases stress and helps you sleep better.  Make exercise fun  Exercise can be fun. Choose an activity you enjoy. You may even get a friend to do it with you:  · Take a resistance-training or aerobics class  · Join a team sport  · Take a dance class  · Walk the dog  · Ride a bike  If you have health problems, be sure to ask your healthcare provider before you start an exercise program. Have a  help you develop a plan thats safe for you.   Date Last Reviewed: 2/4/2016 © 2000-2016 [employer]. [address] [place] [address], [place] [address]. All rights reserved. This information is not intended as a substitute for professional medical care. Always follow your healthcare professional's instructions.        Weight Management: Healthy Eating  Food is your bodys fuel. You cant live without it. The key is to give your body enough nutrients and energy without eating too much. Reading food labels can help you make healthy choices. Also, learn new eating habits to manage your weight.     All the values on the label are based on one serving. The serving size is the average portion. Remember to multiply the values on the label by the number of servings you eat.   Eat less fat  A gram of fat has almost 2.5 times the calories of a gram of protein or carbohydrates. Try to balance your food choices so that only 20% to 35% of your calories comes from total fat. This means an average of 2½ to 3½ grams of fat for each 100 calories you eat.  Eat more fiber  High-fiber foods are digested more slowly than low-fiber foods, so you feel full longer. Try to get at least 25 grams of fiber each day for a 2000 calorie diet. Foods high in fiber include:  · Vegetables and fruits  · Whole-grain or bran breads,  pastas, and cereals  · Legumes (beans) and peas  As you begin to eat more fiber, be sure to drink plenty of water to keep your digestive system working smoothly.  Tips  Do's and don'ts include:   · Dont skip meals. This often leads to overeating later on. Its best to spread your eating throughout the day.  · Eat a variety of foods, not just a few favorites.  · If you find yourself eating when youre not hungry, ask yourself why. Many of us eat when were bored, stressed, or just to be polite. Listen to your body. If youre not hungry, get busy doing something else instead of eating.  · Eat slower, shooting for 20 to 30 minutes for each meal. It takes 20 minutes for your stomach to tell your brain that its full. Slow eaters tend to eat less and are still satisfied, while fast eaters may tend to be overeaters.   · Pay attention to what you eat. Dont read or watch TV during your meal.  Date Last Reviewed: 1/31/2016  © 9959-5191 [employer]. [address] [place] [address]. All rights reserved. This information is not intended as a substitute for professional medical care. Always follow your healthcare professional's instructions.        Weight Management: Overcoming Your Barriers  You may have many reasons why youre not ready to lose weight. You may not feel you have the time or the skills. You may be afraid of losing weight and gaining it back again. Well, you can lose weight. And you can keep the weight off, if you make changes slowly and stick with them. Remember that you may never find the perfect time to lose weight. Decide that the right time to be healthier is now.    Common barriers  Barrier 1: I dont want to deny myself.  Barrier Buster: You dont have to! Moderation is the key:  · Watch portion sizes and know when you're eating more than one serving.  · Plan to ask for a doggy bag when you eat out.  · Have just one.  · Choose lower-fat and lower-calorie versions of your  favorites.  · Use a small plate instead of a normal-sized plate.   Barrier 2: I lost weight before but I gained it right back.  Barrier Buster: Make this time different:  · List what worked and didnt work last time and what you can try this time.  · Choose changes that you are willing to stick with.  · Work exercise into your weight-loss plan.  · Be realistic about what is possible. Your plan has to fit into your life in a balanced way that works for you.   Barrier 3: I dont have the time to be active.  Barrier Buster: It takes just a few minutes a day!  · Be active with a pet or the kids.  · Block off activity time in your schedule.  · Borrow some time that you usually spend watching TV.  · You are too important not to take time to exercise--it is your life!   Feel good about yourself  Do you eat more because you feel bad about yourself, then feel even worse as you gain weight? This is a vicious cycle. Breaking this cycle is not easy. You may need group support or counseling. Always remember that you are a valuable person, no matter what size or shape you are.  Do you have a health problem? If so, dont use it as an excuse for not losing weight. Ask your healthcare provider or dietitian about methods to lose weight that are safe for you. For example, even if you have severe arthritis, it may be easier for you to exercise in a pool. Get advice from a .    Date Last Reviewed: 2/2/2016  © 4417-5370 [employer]. [address] [place] [address], [place] [address]. All rights reserved. This information is not intended as a substitute for professional medical care. Always follow your healthcare professional's instructions.        Weight Management: Getting Started  Healthy bodies come in all shapes and sizes. Not all bodies are made to be thin. For some people, a healthy weight is higher than the average weight listed on weight charts. Your healthcare provider can help you decide on a healthy  weight for you.    Reasons to lose weight  Losing weight can help with some health problems, such as high blood pressure, heart disease, diabetes, sleep apnea, and arthritis. You may also feel more energy.  Set your long-term goal  Your goal doesn't even have to be a specific weight. You may decide on a fitness goal (such as being able to walk 10 miles a week), or a health goal (such as lowering your blood pressure). Choose a goal that is measurable and reasonable, so you know when you've reached it. A goal of reaching a BMI of less than 25 is not always reasonable (or possible).   Make an action plan  Habits dont change overnight. Setting your goals too high can leave you feeling discouraged if you cant reach them. Be realistic. Choose one or two small changes you can make now. Set an action plan for how you are going to make these changes. When you can stick to this plan, keep making a few more small changes. Taking small steps will help you stay on the path to success.  Track your progress  Write down your goals. Then, keep a daily record of your progress. Write down what you eat and how active you are. This record lets you look back on how much youve done. It may also help when youre feeling frustrated. Reward yourself for success. Even if you dont reach every goal, give yourself credit for what you do get done.  Get support  Encouragement from others can help make losing weight easier. Ask your family members and friends for support. They may even want to join you. Also look to your healthcare provider, registered dietitian, and  for help. Your local hospital can give you more information about nutrition, exercise, and weight loss.  Date Last Reviewed: 1/31/2016  © 1266-5176 [employer]. [address] [place] [address], [place] [address]. All rights reserved. This information is not intended as a substitute for professional medical care. Always follow your healthcare professional's  instructions.        Weight Management: Fact and Fiction  Knowing the truth about losing weight can help you separate what works from what doesnt. Dont be taken in by expensive weight-loss fads like pills, herbs, and special foods that promise unbelievable results. Theres no magic way to lose weight. If you have questions about weight loss, ask your healthcare provider.    Fiction: The faster I lose weight, the better.  Fact: Rapid weight loss is usually due to loss of water or muscle mass. What youre trying to get rid of is extra fat. Aim to lose a 1/2 pound to 2 pounds a week. Then youre more likely to lose fat rather than water or muscle.  Fiction: Skipping meals will help me lose weight.  Fact: When you skip meals, you dont give your body the energy it needs to work. Hunger makes you more likely to overeat later on. Its best to spread your meals throughout the day. Eat at least three meals a day.  Fiction: I cant start exercising until I lose weight.  Fact: The sooner you start exercising the better. Exercise helps burn more calories, tone your muscles, and keep your appetite in check. People who continue to exercise after they lose weight are more likely to keep the weight off.  Fiction: The fewer calories I eat, the better.  Fact: This seems like it should be true, but its not. When you eat too few calories, your body acts as if its on a desert island. It thinks food is scarce, so it slows down your metabolism (how fast you burn calories) to save energy. By eating too few calories, you make it harder to lose weight.  Fiction: Once I lose weight, I can go back to living the way I did before.  Fact: Going back to your old eating habits and giving up exercise is a sure way to regain any weight youve lost. The lifestyle changes that help you lose extra weight can also help keep it off. This is why you need to make realistic changes you can stick with.  Fiction: Low-fat and fat-free mean  low-calorie.  Fact: All foods, even fat-free ones, have calories. Eat too many calories and youll gain weight. Its OK to treat yourself to a fat-free cookie or two. Just dont eat the whole box! A dietitian will help you figure this out, and will likely recommend that you eat three meals a day, with protein with each meal.   Date Last Reviewed: 2/4/2016  © 7085-1436 [employer]. [address] [place] [address], [place] [address]. All rights reserved. This information is not intended as a substitute for professional medical care. Always follow your healthcare professional's instructions.

## 2017-04-25 NOTE — PROGRESS NOTES
PM&R NEW PATIENT HISTORY & PHYSICAL :    Referring Physician:    Chief Complaint   Patient presents with    Back Pain     left upper x 6 weeks    Shoulder Pain     left x 6 weeks       HPI: This is a 36 y.o.  female being seen in clinic today for evaluation of left shoulder and upper back pain over the past 6 weeks.  She describes achy pain and tightness that can be worse at night when trying to rest or with increased arm usage.  Resting her arm, muscle relaxant, and heat have provided relief.  She has massage which caused increased irritation.  She denies weakness or numbness/tingling    History obtained from patient    Functional History:  Walking: Not limited  Transfers: Independent  Assistive devices: No  Power mobility: No  Falls: None   Directional preference:change of position    Employment status:  at [employer]    Needs help with:  Nothing - all ADLS normal    Review of Systems:     General- denies lethargy, weight change, fever, chills  Head/neck- denies swallowing difficulties  ENT- denies hearing changes  Cardiovascular-denies chest pain  Pulmonary- denies shortness of breath  GI- denies constipation or bowel incontinence  - denies bladder incontinence  Skin- denies wounds or rashes  Musculoskeletal- denies weakness, +pain  Neurologic- denies numbness and tingling  Psychiatric- denies depressive or psychotic features, denies anxiety  Lymphatic-denies swelling  Endocrine- denies hypoglycemic symptoms/DM history    Physical Examination:  General: Well developed, well nourished female, NAD, very pleasant     Spinal Examination: CERVICAL  Active ROM is within normal limits.  Inspection: No deformity of spinal alignment.  Palpation: No vertebral tenderness to percussion.  Tight and tender bands along left rhomboid, teres minor, trapezius with mild triggering at rhomboid  Spurling test: neg    Spinal Examination: LUMBAR or THORACIC  Active ROM is within normal limits.  Inspection: No  deformity of spinal alignment.      Musculoskeletal Tests:  Phalen: neg   Elbow compression (ulnar): neg  Tinel at wrist:  neg  Scratch-Collapse Test:     Bilateral Upper and Lower Extremities:  Pulses are 2+ at radial, bilaterally.  Shoulder/Elbow/Wrist/Hand ROM wnl except rot cuff weakness on the left  Hip/Knee/Ankle ROM   Bilateral Extremities show normal capillary refill.  No signs of cyanosis, rubor, edema, skin changes, or dysvascular changes of appendages.  Nails appear intact.    Neurological Exam:  Cranial Nerves:  II-XII grossly intact    Manual Muscle Testing: (Motor 5=normal)    RIGHT Upper extremity: Shoulder abduction 5/5, Biceps 5/5, Triceps 5/5, Wrist extension 5/5, Abductor pollicis brevis 5/5, Ulnar hand intrinsics 5/5,  LEFT Upper extremity: Shoulder abduction 5/5, Biceps 5/5, Triceps 5/5, Wrist extension 5/5, Abductor pollicis brevis 5/5, Ulnar hand intrinsics 5/5,  No focal atrophy is noted of either upper or lower extremity.    Bilateral Reflexes:1+bic tric br  Luna's response is absent bilaterally.    Sensation: tested to light touch  - intact in arms    Gait: Narrow base and good arm swing.    Cerebellar: Normal FNF and tandem gait.     IMPRESSION/PLAN: This is a 36 y.o.  female with myofascial pain, rotator cuff weakness, obesity:Body mass index is 36.61 kg/(m^2).      1. rx for PT-Rotator cuff strengthening, myofascial release,stretch, posture, HEP, modalities, dec pain  2. zanaflex 2mg-4mg qhs prn, cont nsaid prn, ice/heat modalities  3. Handouts provided on myofascial pain, exercise, diet, wt loss. Massage therapy rx  4. Fu prn. If not improving, consider cspine xray and EMG    Adriana Burgos M.D.  Physical Medicine and Rehab

## 2017-06-01 ENCOUNTER — OFFICE VISIT (OUTPATIENT)
Dept: INTERNAL MEDICINE | Facility: CLINIC | Age: 37
End: 2017-06-01
Payer: COMMERCIAL

## 2017-06-01 VITALS
TEMPERATURE: 97 F | BODY MASS INDEX: 38.94 KG/M2 | SYSTOLIC BLOOD PRESSURE: 134 MMHG | WEIGHT: 242.31 LBS | HEIGHT: 66 IN | DIASTOLIC BLOOD PRESSURE: 88 MMHG

## 2017-06-01 DIAGNOSIS — F41.9 ANXIETY: ICD-10-CM

## 2017-06-01 DIAGNOSIS — Z29.9 PREVENTIVE MEASURE: ICD-10-CM

## 2017-06-01 DIAGNOSIS — E66.9 OBESITY (BMI 30-39.9): ICD-10-CM

## 2017-06-01 DIAGNOSIS — F17.200 SMOKER: Primary | ICD-10-CM

## 2017-06-01 PROCEDURE — 99203 OFFICE O/P NEW LOW 30 MIN: CPT | Mod: S$GLB,,, | Performed by: FAMILY MEDICINE

## 2017-06-01 PROCEDURE — 99999 PR PBB SHADOW E&M-EST. PATIENT-LVL III: CPT | Mod: PBBFAC,,, | Performed by: FAMILY MEDICINE

## 2017-06-01 RX ORDER — BUPROPION HYDROCHLORIDE 150 MG/1
150 TABLET ORAL DAILY
Qty: 30 TABLET | Refills: 3 | Status: SHIPPED | OUTPATIENT
Start: 2017-06-01 | End: 2017-10-09 | Stop reason: SDUPTHER

## 2017-06-01 NOTE — PROGRESS NOTES
"Subjective:      Patient ID: Niesha Hilton is a 36 y.o. female.    Chief Complaint: Establish Care; Hypertension; and Anxiety    HPI  37 yo pleasant female here to establish care.  She reports a life long hx of anxiety.  Took meds once in college, Effexor.  Caused a lot of weight gain, that made her more anxious.  She has not been on meds in a long while.  She is a  at [employer].  Currently on summer break, so sleeping better and anxiety not as bad as it could be.  She does smoke.  Drinks 1-2 glasses of white wine after work.  Frustrated with her weight.  No labs done in a long time.  Does see her Gyn annually.  Normal cycles.  No bowel issues.    Past Medical History:   Diagnosis Date    Hyperlipidemia     IBS (irritable bowel syndrome)      Family History   Problem Relation Age of Onset    Adopted: Yes     Past Surgical History:   Procedure Laterality Date    WISDOM TOOTH EXTRACTION       Social History   Substance Use Topics    Smoking status: Current Every Day Smoker     Packs/day: 0.50     Years: 15.00     Types: Cigarettes    Smokeless tobacco: Not on file    Alcohol use 1.2 oz/week     2 Glasses of wine per week      Comment: 2 glasses wine nightly       /88 (BP Location: Left arm, Patient Position: Sitting, BP Method: Manual)   Temp 97.2 °F (36.2 °C) (Tympanic)   Ht 5' 5.5" (1.664 m)   Wt 109.9 kg (242 lb 4.6 oz)   LMP 05/31/2017   BMI 39.71 kg/m²     Review of Systems   Constitutional: Negative.    HENT: Negative.    Respiratory: Negative for shortness of breath.    Cardiovascular: Negative for chest pain and palpitations.   Gastrointestinal: Negative.    Musculoskeletal: Negative for neck pain.   Skin: Negative.    Neurological: Negative for headaches.   Psychiatric/Behavioral: Positive for sleep disturbance. The patient is nervous/anxious.      Objective:     Physical Exam   Constitutional: She is oriented to person, place, and time. She appears well-developed and " well-nourished. No distress.   HENT:   Right Ear: External ear normal.   Left Ear: External ear normal.   Nose: Nose normal.   Mouth/Throat: Oropharynx is clear and moist.   Eyes: Pupils are equal, round, and reactive to light.   Neck: Normal range of motion. Neck supple.   Cardiovascular: Normal rate, regular rhythm and normal heart sounds.  Exam reveals no gallop.    No murmur heard.  Pulmonary/Chest: Effort normal and breath sounds normal. No respiratory distress. She has no wheezes. She has no rales.   Abdominal: Soft. Bowel sounds are normal. She exhibits no distension. There is no tenderness.   Musculoskeletal: She exhibits no edema.   Neurological: She is alert and oriented to person, place, and time. No cranial nerve deficit.   Skin: Skin is warm and dry. No rash noted.   Psychiatric: She has a normal mood and affect. Her behavior is normal. Judgment and thought content normal.   Nursing note and vitals reviewed.      No results found for: WBC, HGB, HCT, PLT, CHOL, TRIG, HDL, LDLDIRECT, ALT, AST, NA, K, CL, CREATININE, BUN, CO2, TSH, PSA, INR, GLUF, HGBA1C, MICROALBUR    Assessment:     1. Smoker    2. Anxiety    3. Obesity (BMI 30-39.9)    4. Preventive measure       Plan:   Smoker    Anxiety    Obesity (BMI 30-39.9)    Preventive measure  -     CBC auto differential; Future; Expected date: 07/01/2017  -     Comprehensive metabolic panel; Future; Expected date: 07/01/2017  -     TSH; Future; Expected date: 07/01/2017  -     Lipid panel; Future; Expected date: 07/01/2017  -     INSULIN, RANDOM; Future; Expected date: 07/01/2017  -     Hemoglobin A1c; Future; Expected date: 07/01/2017    Other orders  -     buPROPion (WELLBUTRIN XL) 150 MG TB24 tablet; Take 1 tablet (150 mg total) by mouth once daily.  Dispense: 30 tablet; Refill: 3    First task at hand is to gain better control of the anxiety.  Her BP is borderline, came down at end of our visit.  Avoid meds causing weight gain  Will try Wellbutrin and see  if anxiety can be better controlled and work on smoking cessation.  F/u 1 mos with labs prior.  Will work towards healthy coping mechanisms in exercise/meditation type activities.

## 2017-06-15 ENCOUNTER — LAB VISIT (OUTPATIENT)
Dept: LAB | Facility: HOSPITAL | Age: 37
End: 2017-06-15
Attending: FAMILY MEDICINE
Payer: COMMERCIAL

## 2017-06-15 DIAGNOSIS — Z29.9 PREVENTIVE MEASURE: ICD-10-CM

## 2017-06-15 LAB
ALBUMIN SERPL BCP-MCNC: 3.5 G/DL
ALP SERPL-CCNC: 70 U/L
ALT SERPL W/O P-5'-P-CCNC: 44 U/L
ANION GAP SERPL CALC-SCNC: 10 MMOL/L
AST SERPL-CCNC: 23 U/L
BASOPHILS # BLD AUTO: 0.07 K/UL
BASOPHILS NFR BLD: 1.1 %
BILIRUB SERPL-MCNC: 0.4 MG/DL
BUN SERPL-MCNC: 10 MG/DL
CALCIUM SERPL-MCNC: 8.9 MG/DL
CHLORIDE SERPL-SCNC: 104 MMOL/L
CHOLEST/HDLC SERPL: 6.4 {RATIO}
CO2 SERPL-SCNC: 24 MMOL/L
CREAT SERPL-MCNC: 0.8 MG/DL
DIFFERENTIAL METHOD: ABNORMAL
EOSINOPHIL # BLD AUTO: 0.4 K/UL
EOSINOPHIL NFR BLD: 6.5 %
ERYTHROCYTE [DISTWIDTH] IN BLOOD BY AUTOMATED COUNT: 12.2 %
EST. GFR  (AFRICAN AMERICAN): >60 ML/MIN/1.73 M^2
EST. GFR  (NON AFRICAN AMERICAN): >60 ML/MIN/1.73 M^2
GLUCOSE SERPL-MCNC: 102 MG/DL
HCT VFR BLD AUTO: 41.9 %
HDL/CHOLESTEROL RATIO: 15.6 %
HDLC SERPL-MCNC: 302 MG/DL
HDLC SERPL-MCNC: 47 MG/DL
HGB BLD-MCNC: 13.8 G/DL
INSULIN COLLECTION INTERVAL: NORMAL
INSULIN SERPL-ACNC: 14.8 UU/ML
LDLC SERPL CALC-MCNC: 230.2 MG/DL
LYMPHOCYTES # BLD AUTO: 2 K/UL
LYMPHOCYTES NFR BLD: 31.8 %
MCH RBC QN AUTO: 31.2 PG
MCHC RBC AUTO-ENTMCNC: 32.9 %
MCV RBC AUTO: 95 FL
MONOCYTES # BLD AUTO: 0.5 K/UL
MONOCYTES NFR BLD: 7.5 %
NEUTROPHILS # BLD AUTO: 3.3 K/UL
NEUTROPHILS NFR BLD: 52.9 %
NONHDLC SERPL-MCNC: 255 MG/DL
PLATELET # BLD AUTO: 388 K/UL
PMV BLD AUTO: 10.3 FL
POTASSIUM SERPL-SCNC: 4 MMOL/L
PROT SERPL-MCNC: 7 G/DL
RBC # BLD AUTO: 4.42 M/UL
SODIUM SERPL-SCNC: 138 MMOL/L
TRIGL SERPL-MCNC: 124 MG/DL
TSH SERPL DL<=0.005 MIU/L-ACNC: 1.58 UIU/ML
WBC # BLD AUTO: 6.16 K/UL

## 2017-06-15 PROCEDURE — 80061 LIPID PANEL: CPT

## 2017-06-15 PROCEDURE — 85025 COMPLETE CBC W/AUTO DIFF WBC: CPT

## 2017-06-15 PROCEDURE — 84443 ASSAY THYROID STIM HORMONE: CPT

## 2017-06-15 PROCEDURE — 83036 HEMOGLOBIN GLYCOSYLATED A1C: CPT

## 2017-06-15 PROCEDURE — 80053 COMPREHEN METABOLIC PANEL: CPT

## 2017-06-15 PROCEDURE — 36415 COLL VENOUS BLD VENIPUNCTURE: CPT | Mod: PO

## 2017-06-15 PROCEDURE — 83525 ASSAY OF INSULIN: CPT

## 2017-06-16 LAB
ESTIMATED AVG GLUCOSE: 105 MG/DL
HBA1C MFR BLD HPLC: 5.3 %

## 2017-06-29 ENCOUNTER — OFFICE VISIT (OUTPATIENT)
Dept: INTERNAL MEDICINE | Facility: CLINIC | Age: 37
End: 2017-06-29
Payer: COMMERCIAL

## 2017-06-29 VITALS
TEMPERATURE: 98 F | SYSTOLIC BLOOD PRESSURE: 126 MMHG | DIASTOLIC BLOOD PRESSURE: 86 MMHG | HEART RATE: 94 BPM | HEIGHT: 66 IN | BODY MASS INDEX: 38.65 KG/M2 | WEIGHT: 240.5 LBS

## 2017-06-29 DIAGNOSIS — Z00.00 ANNUAL PHYSICAL EXAM: Primary | ICD-10-CM

## 2017-06-29 DIAGNOSIS — E78.5 HYPERLIPIDEMIA, UNSPECIFIED HYPERLIPIDEMIA TYPE: ICD-10-CM

## 2017-06-29 PROCEDURE — 99395 PREV VISIT EST AGE 18-39: CPT | Mod: S$GLB,,, | Performed by: FAMILY MEDICINE

## 2017-06-29 PROCEDURE — 99999 PR PBB SHADOW E&M-EST. PATIENT-LVL III: CPT | Mod: PBBFAC,,, | Performed by: FAMILY MEDICINE

## 2017-06-29 NOTE — PROGRESS NOTES
"Subjective:      Patient ID: Niesha Hilton is a 36 y.o. female.    Chief Complaint:  F/U for lab review/annual    HPI  37 yo female here to update annual.  Saw Gyn, normal well woman. BP at visit was 124/78.  She is feeling better on the wellbutrin, anxiety is coming down.  Even finds she craves less with smoking.  Hx of hyperlipidemia, on simvastatin in past and had SE.    Past Medical History:   Diagnosis Date    Hyperlipidemia     IBS (irritable bowel syndrome)      Family History   Problem Relation Age of Onset    Adopted: Yes     Past Surgical History:   Procedure Laterality Date    WISDOM TOOTH EXTRACTION       Social History   Substance Use Topics    Smoking status: Current Every Day Smoker     Packs/day: 0.50     Years: 15.00     Types: Cigarettes    Smokeless tobacco: Never Used    Alcohol use 1.2 oz/week     2 Glasses of wine per week      Comment: 2 glasses wine nightly       /86   Pulse 94   Temp 98.1 °F (36.7 °C)   Ht 5' 5.5" (1.664 m)   Wt 109.1 kg (240 lb 8.4 oz)   LMP 05/31/2017   BMI 39.42 kg/m²     Review of Systems   Constitutional: Negative for activity change and unexpected weight change.   HENT: Negative for hearing loss, rhinorrhea and trouble swallowing.    Eyes: Negative for discharge and visual disturbance.   Respiratory: Negative for chest tightness and wheezing.    Cardiovascular: Negative for chest pain and palpitations.   Gastrointestinal: Positive for constipation. Negative for blood in stool, diarrhea and vomiting.   Endocrine: Negative for polydipsia and polyuria.   Genitourinary: Negative for difficulty urinating, dysuria, hematuria and menstrual problem.   Musculoskeletal: Negative for arthralgias, joint swelling and neck pain.   Skin: Negative.    Neurological: Negative for weakness and headaches.   Psychiatric/Behavioral: Negative for confusion and dysphoric mood. The patient is nervous/anxious.      Objective:     Physical Exam   Constitutional: She is " oriented to person, place, and time. She appears well-developed and well-nourished. No distress.   HENT:   Right Ear: External ear normal.   Left Ear: External ear normal.   Nose: Nose normal.   Mouth/Throat: Oropharynx is clear and moist.   Eyes: Pupils are equal, round, and reactive to light.   Neck: Normal range of motion. Neck supple.   Cardiovascular: Normal rate, regular rhythm and normal heart sounds.    Pulmonary/Chest: Effort normal and breath sounds normal. No respiratory distress. She has no wheezes. She has no rales.   Abdominal: Soft. Bowel sounds are normal. She exhibits no distension. There is no tenderness.   Musculoskeletal: She exhibits no edema.   Neurological: She is alert and oriented to person, place, and time. No cranial nerve deficit.   Skin: Skin is warm and dry. No rash noted.   Psychiatric: She has a normal mood and affect. Her behavior is normal. Judgment and thought content normal.   Nursing note and vitals reviewed.      Lab Results   Component Value Date    WBC 6.16 06/15/2017    HGB 13.8 06/15/2017    HCT 41.9 06/15/2017     (H) 06/15/2017    CHOL 302 (H) 06/15/2017    TRIG 124 06/15/2017    HDL 47 06/15/2017    ALT 44 06/15/2017    AST 23 06/15/2017     06/15/2017    K 4.0 06/15/2017     06/15/2017    CREATININE 0.8 06/15/2017    BUN 10 06/15/2017    CO2 24 06/15/2017    TSH 1.578 06/15/2017    HGBA1C 5.3 06/15/2017       Assessment:     1. Annual physical exam    2. Hyperlipidemia, unspecified hyperlipidemia type       Plan:   Annual physical exam    Hyperlipidemia, unspecified hyperlipidemia type  -     Lipid panel; Future; Expected date: 10/29/2017    Other orders  -     (In Office Administered) Tdap Vaccine    BP stable, gets better after sitting.  Anxiety improving, consider increasing to bid once school starts back.  Focus on low fat/high fiber diet, exercise and weight loss.  LDL is very high, pt wants a shot at diet/weight loss before statin.  Will recheck  in 4 mos  Adacel needed, out of supply.  Can return for nurse visit

## 2017-10-10 RX ORDER — BUPROPION HYDROCHLORIDE 150 MG/1
TABLET ORAL
Qty: 30 TABLET | Refills: 6 | Status: SHIPPED | OUTPATIENT
Start: 2017-10-10 | End: 2018-07-12 | Stop reason: SDUPTHER

## 2017-11-20 ENCOUNTER — OFFICE VISIT (OUTPATIENT)
Dept: INTERNAL MEDICINE | Facility: CLINIC | Age: 37
End: 2017-11-20
Payer: COMMERCIAL

## 2017-11-20 VITALS
HEIGHT: 66 IN | DIASTOLIC BLOOD PRESSURE: 88 MMHG | OXYGEN SATURATION: 98 % | SYSTOLIC BLOOD PRESSURE: 138 MMHG | BODY MASS INDEX: 39.9 KG/M2 | TEMPERATURE: 98 F | WEIGHT: 248.25 LBS | HEART RATE: 110 BPM

## 2017-11-20 DIAGNOSIS — E78.5 HYPERLIPIDEMIA, UNSPECIFIED HYPERLIPIDEMIA TYPE: Primary | ICD-10-CM

## 2017-11-20 DIAGNOSIS — Z29.9 PREVENTIVE MEASURE: ICD-10-CM

## 2017-11-20 DIAGNOSIS — E88.819 INSULIN RESISTANCE: ICD-10-CM

## 2017-11-20 PROCEDURE — 99999 PR PBB SHADOW E&M-EST. PATIENT-LVL III: CPT | Mod: PBBFAC,,, | Performed by: FAMILY MEDICINE

## 2017-11-20 PROCEDURE — 99213 OFFICE O/P EST LOW 20 MIN: CPT | Mod: 25,S$GLB,, | Performed by: FAMILY MEDICINE

## 2017-11-20 PROCEDURE — 90715 TDAP VACCINE 7 YRS/> IM: CPT | Mod: S$GLB,,, | Performed by: FAMILY MEDICINE

## 2017-11-20 PROCEDURE — 90471 IMMUNIZATION ADMIN: CPT | Mod: S$GLB,,, | Performed by: FAMILY MEDICINE

## 2017-11-20 RX ORDER — METFORMIN HYDROCHLORIDE 500 MG/1
TABLET ORAL
Refills: 6 | COMMUNITY
Start: 2017-10-25 | End: 2019-02-05

## 2017-11-20 NOTE — PROGRESS NOTES
"Subjective:      Patient ID: Niesha Abarca is a 37 y.o. female.    Chief Complaint:  F/U lipids/mood    HPI  36 yo female here for f/u.  Was supposed to be focusing on weight loss to bring down lipids.  No labs done.  Has had rough few mos.  Had appendix removed in July.  Saw Gyn for abnormal bleeding.  Had some labs/2 hrs test showed insulin resistance.  Put on metformin.  Taking bid, causing diarrhea.    Wellbutrin is doing ok, occ stressful weeks she feels maybe she needs a stronger dose but then things will calm down.   Currently, calm.  She and  are not trying to prevent pregnancy at this time.    Past Medical History:   Diagnosis Date    Hyperlipidemia     IBS (irritable bowel syndrome)      Family History   Problem Relation Age of Onset    Adopted: Yes     Past Surgical History:   Procedure Laterality Date    APPENDECTOMY      WISDOM TOOTH EXTRACTION       Social History   Substance Use Topics    Smoking status: Current Every Day Smoker     Packs/day: 0.50     Years: 15.00     Types: Cigarettes    Smokeless tobacco: Never Used    Alcohol use 1.2 oz/week     2 Glasses of wine per week      Comment: 2 glasses wine nightly       /88   Pulse 110   Temp 98 °F (36.7 °C)   Ht 5' 5.5" (1.664 m)   Wt 112.6 kg (248 lb 3.8 oz)   LMP 10/29/2017   SpO2 98%   BMI 40.68 kg/m²     Review of Systems   Constitutional: Negative for activity change and unexpected weight change.   HENT: Negative for hearing loss, rhinorrhea and trouble swallowing.    Eyes: Negative for discharge and visual disturbance.   Respiratory: Negative for chest tightness and wheezing.    Cardiovascular: Negative for chest pain and palpitations.   Gastrointestinal: Positive for diarrhea. Negative for blood in stool, constipation and vomiting.   Endocrine: Negative for polydipsia and polyuria.   Genitourinary: Positive for menstrual problem. Negative for difficulty urinating, dysuria and hematuria.   Musculoskeletal: " Negative for arthralgias, joint swelling and neck pain.   Neurological: Negative for weakness and headaches.   Psychiatric/Behavioral: Negative for confusion and dysphoric mood.     Objective:     Physical Exam   Constitutional: She is oriented to person, place, and time. She appears well-developed and well-nourished.   Cardiovascular: Normal rate, regular rhythm and normal heart sounds.    Pulmonary/Chest: Effort normal and breath sounds normal. No respiratory distress.   Abdominal: Soft. Bowel sounds are normal. She exhibits no distension.   Musculoskeletal: She exhibits no edema.   Neurological: She is alert and oriented to person, place, and time.   Psychiatric: She has a normal mood and affect. Her behavior is normal. Judgment and thought content normal.   Nursing note and vitals reviewed.      Lab Results   Component Value Date    WBC 6.16 06/15/2017    HGB 13.8 06/15/2017    HCT 41.9 06/15/2017     (H) 06/15/2017    CHOL 302 (H) 06/15/2017    TRIG 124 06/15/2017    HDL 47 06/15/2017    ALT 44 06/15/2017    AST 23 06/15/2017     06/15/2017    K 4.0 06/15/2017     06/15/2017    CREATININE 0.8 06/15/2017    BUN 10 06/15/2017    CO2 24 06/15/2017    TSH 1.578 06/15/2017    HGBA1C 5.3 06/15/2017       Assessment:     1. Hyperlipidemia, unspecified hyperlipidemia type    2. Insulin resistance    3. Preventive measure       Plan:   Hyperlipidemia, unspecified hyperlipidemia type    Insulin resistance    Preventive measure  -     CBC auto differential; Future; Expected date: 05/20/2018  -     Comprehensive metabolic panel; Future; Expected date: 05/20/2018  -     TSH; Future; Expected date: 05/20/2018  -     Lipid panel; Future; Expected date: 05/20/2018  -     INSULIN, RANDOM; Future; Expected date: 05/20/2018  -     Hemoglobin A1c; Future; Expected date: 05/20/2018    Other orders  -     (In Office Administered) Tdap Vaccine    No reason to check lipids today.  Potentially trying to get pregnant,  statin not indicated.  Will hold off.  Focus on good eating/exercise/weight loss  Cont metformin, can take 2 at dinner time if tolerated better.  Diarrhea may improve.    Cycle regulating since starting metformin  Cont wellbutrin  Adacel today  Declines flu  F/U 6-7 mos with labs

## 2018-05-12 ENCOUNTER — PATIENT MESSAGE (OUTPATIENT)
Dept: INTERNAL MEDICINE | Facility: CLINIC | Age: 38
End: 2018-05-12

## 2018-06-20 ENCOUNTER — TELEPHONE (OUTPATIENT)
Dept: INTERNAL MEDICINE | Facility: CLINIC | Age: 38
End: 2018-06-20

## 2018-06-20 NOTE — TELEPHONE ENCOUNTER
Called pt about rescheduling her 7 month f/u and labs that she canceled and she said that she sent email to Dr. Bentley about waiting after her pregnancy to come in.  Dr. Bentley said fine to see after her delivery in Nov.

## 2018-07-12 RX ORDER — BUPROPION HYDROCHLORIDE 150 MG/1
TABLET ORAL
Qty: 90 TABLET | Refills: 1 | Status: SHIPPED | OUTPATIENT
Start: 2018-07-12 | End: 2019-02-27 | Stop reason: SDUPTHER

## 2018-07-12 RX ORDER — BUPROPION HYDROCHLORIDE 150 MG/1
TABLET ORAL
Qty: 30 TABLET | Refills: 6 | Status: SHIPPED | OUTPATIENT
Start: 2018-07-12 | End: 2018-07-12 | Stop reason: SDUPTHER

## 2018-11-26 ENCOUNTER — PATIENT MESSAGE (OUTPATIENT)
Dept: INTERNAL MEDICINE | Facility: CLINIC | Age: 38
End: 2018-11-26

## 2018-12-18 ENCOUNTER — OFFICE VISIT (OUTPATIENT)
Dept: INTERNAL MEDICINE | Facility: CLINIC | Age: 38
End: 2018-12-18
Payer: COMMERCIAL

## 2018-12-18 VITALS
SYSTOLIC BLOOD PRESSURE: 140 MMHG | TEMPERATURE: 98 F | BODY MASS INDEX: 38.62 KG/M2 | HEIGHT: 66 IN | WEIGHT: 240.31 LBS | DIASTOLIC BLOOD PRESSURE: 96 MMHG | HEART RATE: 88 BPM

## 2018-12-18 DIAGNOSIS — E88.819 INSULIN RESISTANCE: ICD-10-CM

## 2018-12-18 DIAGNOSIS — E66.9 OBESITY (BMI 30-39.9): ICD-10-CM

## 2018-12-18 DIAGNOSIS — I10 HYPERTENSION, UNSPECIFIED TYPE: Primary | ICD-10-CM

## 2018-12-18 DIAGNOSIS — F41.9 ANXIETY: ICD-10-CM

## 2018-12-18 PROCEDURE — 99214 OFFICE O/P EST MOD 30 MIN: CPT | Mod: S$GLB,,, | Performed by: FAMILY MEDICINE

## 2018-12-18 PROCEDURE — 3008F BODY MASS INDEX DOCD: CPT | Mod: CPTII,S$GLB,, | Performed by: FAMILY MEDICINE

## 2018-12-18 PROCEDURE — 99999 PR PBB SHADOW E&M-EST. PATIENT-LVL III: CPT | Mod: PBBFAC,,, | Performed by: FAMILY MEDICINE

## 2018-12-18 RX ORDER — LABETALOL 100 MG/1
100 TABLET, FILM COATED ORAL 2 TIMES DAILY
Qty: 60 TABLET | Refills: 3 | Status: SHIPPED | OUTPATIENT
Start: 2018-12-18 | End: 2019-07-11 | Stop reason: SDUPTHER

## 2018-12-18 RX ORDER — TETANUS TOXOID, REDUCED DIPHTHERIA TOXOID AND ACELLULAR PERTUSSIS VACCINE, ADSORBED 5; 2.5; 8; 8; 2.5 [IU]/.5ML; [IU]/.5ML; UG/.5ML; UG/.5ML; UG/.5ML
SUSPENSION INTRAMUSCULAR
COMMUNITY
Start: 2018-09-22 | End: 2018-12-18

## 2018-12-18 RX ORDER — NIFEDIPINE 60 MG/1
60 TABLET, EXTENDED RELEASE ORAL DAILY
COMMUNITY
End: 2018-12-18 | Stop reason: SINTOL

## 2018-12-19 NOTE — PROGRESS NOTES
"Subjective:      Patient ID: Niesha Abarca is a 38 y.o. female.    Chief Complaint: Hypertension    HPI  37 yo female here for f/u on BP.  Had baby in mid Oct, developed pre-eclampsia.  Was put on Procardia and recently released from her OB and advised to see PCP.  Finds that the procardia makes her dizzy, have HAs and just feel fatigued.  Baby is young, sleep not great.  Back at work, .  Would like to try a different med.  She is not breast feeding.  She didn't take her dose of Procardia last night.  Reports her BP has been running similar to now, occ lower.  No CP/SOB  Does suffer with anxiety, job is stressful but this time of year is much more laid back.  More worried about going back after the holidays.    Past Medical History:   Diagnosis Date    Hyperlipidemia     IBS (irritable bowel syndrome)      Family History   Adopted: Yes     Past Surgical History:   Procedure Laterality Date    APPENDECTOMY      WISDOM TOOTH EXTRACTION       Social History     Tobacco Use    Smoking status: Former Smoker     Packs/day: 0.50     Years: 15.00     Pack years: 7.50     Types: Cigarettes     Last attempt to quit: 2018     Years since quittin.6    Smokeless tobacco: Never Used   Substance Use Topics    Alcohol use: Yes     Alcohol/week: 1.2 oz     Types: 2 Glasses of wine per week     Comment: 2 glasses wine nightly    Drug use: No       BP (!) 140/96   Pulse 88   Temp 97.8 °F (36.6 °C) (Tympanic)   Ht 5' 5.5" (1.664 m)   Wt 109 kg (240 lb 4.8 oz)   BMI 39.38 kg/m²     Review of Systems   Constitutional: Positive for fatigue. Negative for activity change, appetite change, chills, diaphoresis, fever and unexpected weight change.   HENT: Negative for ear pain, hearing loss, postnasal drip, rhinorrhea and tinnitus.    Eyes: Negative for visual disturbance.   Respiratory: Negative for cough, shortness of breath and wheezing.    Cardiovascular: Negative for chest pain, palpitations and " leg swelling.   Gastrointestinal: Negative for abdominal distention.   Genitourinary: Negative for dysuria, frequency, hematuria and urgency.   Neurological: Positive for dizziness. Negative for weakness and headaches.   Hematological: Negative for adenopathy.   Psychiatric/Behavioral: The patient is nervous/anxious.         Took wellbutrin throughout pregnancy.  Stable on med       Objective:     Physical Exam   Constitutional: She is oriented to person, place, and time. She appears well-developed and well-nourished.   HENT:   Mouth/Throat: Oropharynx is clear and moist.   Eyes: Pupils are equal, round, and reactive to light.   Cardiovascular: Normal rate, regular rhythm and normal heart sounds.   Pulmonary/Chest: Effort normal and breath sounds normal. No stridor. No respiratory distress. She has no wheezes.   Abdominal: Soft. Bowel sounds are normal. She exhibits no distension. There is no tenderness.   Musculoskeletal: She exhibits no edema.   Neurological: She is alert and oriented to person, place, and time.   Skin: Skin is warm and dry.   Psychiatric: She has a normal mood and affect. Her behavior is normal. Judgment and thought content normal.   Nursing note and vitals reviewed.      Lab Results   Component Value Date    WBC 6.16 06/15/2017    HGB 13.8 06/15/2017    HCT 41.9 06/15/2017     (H) 06/15/2017    CHOL 302 (H) 06/15/2017    TRIG 124 06/15/2017    HDL 47 06/15/2017    ALT 44 06/15/2017    AST 23 06/15/2017     06/15/2017    K 4.0 06/15/2017     06/15/2017    CREATININE 0.8 06/15/2017    BUN 10 06/15/2017    CO2 24 06/15/2017    TSH 1.578 06/15/2017    HGBA1C 5.3 06/15/2017       Assessment:     1. Hypertension, unspecified type    2. Insulin resistance    3. Obesity (BMI 30-39.9)    4. Anxiety         Plan:     Hypertension, unspecified type  -     CBC auto differential; Future; Expected date: 01/15/2019  -     Comprehensive metabolic panel; Future; Expected date:  01/15/2019    Insulin resistance  -     Hemoglobin A1c; Future; Expected date: 01/15/2019  -     INSULIN, RANDOM; Future; Expected date: 01/15/2019    Obesity (BMI 30-39.9)    Anxiety    Other orders  -     labetalol (NORMODYNE) 100 MG tablet; Take 1 tablet (100 mg total) by mouth 2 (two) times daily.  Dispense: 60 tablet; Refill: 3    Stop the procardia  Start labetalol 100mg bid  DASH diet  Cont with smoking cessation  Cont with wellbutrin  Weight is lower than last visit, cont with healthy eating/regular exercise/weight loss  F/u with labs in 4-6 wks  Declines flu shot, may return for nurse shot

## 2019-01-03 ENCOUNTER — OFFICE VISIT (OUTPATIENT)
Dept: DERMATOLOGY | Facility: CLINIC | Age: 39
End: 2019-01-03
Payer: COMMERCIAL

## 2019-01-03 DIAGNOSIS — D48.5 NEOPLASM OF UNCERTAIN BEHAVIOR OF SKIN: ICD-10-CM

## 2019-01-03 DIAGNOSIS — L81.4 LENTIGINES: ICD-10-CM

## 2019-01-03 DIAGNOSIS — D22.9 MULTIPLE NEVI: Primary | ICD-10-CM

## 2019-01-03 DIAGNOSIS — D22.39 FIBROUS PAPULE OF NOSE: ICD-10-CM

## 2019-01-03 PROCEDURE — 88305 TISSUE EXAM BY PATHOLOGIST: CPT | Mod: 26,,, | Performed by: PATHOLOGY

## 2019-01-03 PROCEDURE — 11102 TANGNTL BX SKIN SINGLE LES: CPT | Mod: S$GLB,,, | Performed by: DERMATOLOGY

## 2019-01-03 PROCEDURE — 99999 PR PBB SHADOW E&M-EST. PATIENT-LVL III: CPT | Mod: PBBFAC,,, | Performed by: DERMATOLOGY

## 2019-01-03 PROCEDURE — 88305 TISSUE SPECIMEN TO PATHOLOGY, DERMATOLOGY: ICD-10-PCS | Mod: 26,,, | Performed by: PATHOLOGY

## 2019-01-03 PROCEDURE — 99203 OFFICE O/P NEW LOW 30 MIN: CPT | Mod: 25,S$GLB,, | Performed by: DERMATOLOGY

## 2019-01-03 PROCEDURE — 99999 PR PBB SHADOW E&M-EST. PATIENT-LVL III: ICD-10-PCS | Mod: PBBFAC,,, | Performed by: DERMATOLOGY

## 2019-01-03 PROCEDURE — 88305 TISSUE EXAM BY PATHOLOGIST: CPT | Performed by: PATHOLOGY

## 2019-01-03 PROCEDURE — 99203 PR OFFICE/OUTPT VISIT, NEW, LEVL III, 30-44 MIN: ICD-10-PCS | Mod: 25,S$GLB,, | Performed by: DERMATOLOGY

## 2019-01-03 PROCEDURE — 11102 PR TANGENTIAL BIOPSY, SKIN, SINGLE LESION: ICD-10-PCS | Mod: S$GLB,,, | Performed by: DERMATOLOGY

## 2019-01-03 NOTE — PATIENT INSTRUCTIONS
Shave Biopsy Wound Care    Your doctor has performed a shave biopsy today.  A band aid and vaseline ointment has been placed over the site.  This should remain in place for 24 hours.  It is recommended that you keep the area dry for the first 24 hours.  After 24 hours, you may remove the band aid and wash the area with warm soap and water and apply Vaseline jelly.  Many patients prefer to use Neosporin or Bacitracin ointment.  This is acceptable; however, know that you can develop an allergy to this medication even if you have used it safely for years.  It is important to keep the area moist.  Letting it dry out and get air slows healing time, and will worsen the scar.  Band aid is optional after first 24 hours.      If you notice increasing redness, tenderness, pain, or yellow drainage at the biopsy site, please notify your doctor.  These are signs of an infection.    If your biopsy site is bleeding, apply firm pressure for 15 minutes straight.  Repeat for another 15 minutes, if it is still bleeding.   If the surgical site continues to bleed, then please contact your doctor.      [facility]   DERMATOLOGY  [address]   [place] [address]   Dept: [phone]   Dept Fax: [phone]

## 2019-01-03 NOTE — PROGRESS NOTES
Subjective:       Patient ID:  Niesha Abarca is a 38 y.o. female who presents for   Chief Complaint   Patient presents with    Mole     c/o of mole to back growing    Skin Check     TBSE     History of Present Illness: The patient presents with chief complaint of mole  Location: back  Duration: over a year  Signs/Symptoms: growing    Prior treatments: none    Pt uncertain of family history        Review of Systems   Constitutional: Negative for fever and chills.   Gastrointestinal: Negative for nausea and vomiting.   Skin: Positive for activity-related sunscreen use. Negative for daily sunscreen use and recent sunburn.   Hematologic/Lymphatic: Does not bruise/bleed easily.        Objective:    Physical Exam   Constitutional: She appears well-developed and well-nourished. No distress.   Neurological: She is alert and oriented to person, place, and time. She is not disoriented.   Psychiatric: She has a normal mood and affect.   Skin:   Areas Examined (abnormalities noted in diagram):   Scalp / Hair Palpated and Inspected  Head / Face Inspection Performed  Neck Inspection Performed  Chest / Axilla Inspection Performed  Abdomen Inspection Performed  Genitals / Buttocks / Groin Inspection Performed  Back Inspection Performed  RUE Inspected  LUE Inspection Performed  RLE Inspected  LLE Inspection Performed  Nails and Digits Inspection Performed                   Diagram Legend     Erythematous scaling macule/papule c/w actinic keratosis       Vascular papule c/w angioma      Pigmented verrucoid papule/plaque c/w seborrheic keratosis      Yellow umbilicated papule c/w sebaceous hyperplasia      Irregularly shaped tan macule c/w lentigo     1-2 mm smooth white papules consistent with Milia      Movable subcutaneous cyst with punctum c/w epidermal inclusion cyst      Subcutaneous movable cyst c/w pilar cyst      Firm pink to brown papule c/w dermatofibroma      Pedunculated fleshy papule(s) c/w skin tag(s)       Evenly pigmented macule c/w junctional nevus     Mildly variegated pigmented, slightly irregular-bordered macule c/w mildly atypical nevus      Flesh colored to evenly pigmented papule c/w intradermal nevus       Pink pearly papule/plaque c/w basal cell carcinoma      Erythematous hyperkeratotic cursted plaque c/w SCC      Surgical scar with no sign of skin cancer recurrence      Open and closed comedones      Inflammatory papules and pustules      Verrucoid papule consistent consistent with wart     Erythematous eczematous patches and plaques     Dystrophic onycholytic nail with subungual debris c/w onychomycosis     Umbilicated papule    Erythematous-base heme-crusted tan verrucoid plaque consistent with inflamed seborrheic keratosis     Erythematous Silvery Scaling Plaque c/w Psoriasis     See annotation                Assessment / Plan:      Pathology Orders:     Normal Orders This Visit    Tissue Specimen To Pathology, Dermatology     Questions:    Directional Terms:  Other(comment)    Clinical information:  nevus r/o atypia    Specific Site:  right lower back        Multiple nevi  Lentigines  Reassurance given.  Discussed ABCDEF of melanoma and changes for patient to look for.  AAD Handout given. Discussed importance of daily use of sunscreen which is broad-spectrum and has a minimum SPF of 30.    Fibrous papule of nose  Reassurance given.  Lesions are benign.    Neoplasm of uncertain behavior of skin  -     Tissue Specimen To Pathology, Dermatology  -     Shave biopsy(-ies) done of 1 site(s).   Patient informed to call for results within 2 weeks if have not received notification via telephone call or [facility]           Follow-up for call for results.     PROCEDURE NOTE - SHAVE BIOPSY   Location: see above    After risk, benefits, and alternatives were discussed with the patient, the patient agrees to the procedure by verbal informed consent.  The area(s) were cleansed with alcohol. 2 cc of lidocaine 1% with  epinephrine was injected for local anesthesia into each lesion(s).  A sharp dermablade was used to remove part or all of the lesion(s).  The specimen(s) will be sent for tissue pathology.  Hemostasis was obtained with aluminum chloride and/or hyfrecation.  The area(s) were dressed with vaseline ointment and bandaged.  The patient tolerated the procedure well without adverse events.  Wound care instructions were given to the patient on the AVS.  The patient will be notified of pathology results once available. Results will also be available in Epic.

## 2019-01-29 ENCOUNTER — LAB VISIT (OUTPATIENT)
Dept: LAB | Facility: HOSPITAL | Age: 39
End: 2019-01-29
Attending: FAMILY MEDICINE
Payer: COMMERCIAL

## 2019-01-29 DIAGNOSIS — I10 HYPERTENSION, UNSPECIFIED TYPE: ICD-10-CM

## 2019-01-29 DIAGNOSIS — E88.819 INSULIN RESISTANCE: ICD-10-CM

## 2019-01-29 LAB
ALBUMIN SERPL BCP-MCNC: 3.5 G/DL
ALP SERPL-CCNC: 90 U/L
ALT SERPL W/O P-5'-P-CCNC: 42 U/L
ANION GAP SERPL CALC-SCNC: 8 MMOL/L
AST SERPL-CCNC: 19 U/L
BASOPHILS # BLD AUTO: 0.06 K/UL
BASOPHILS NFR BLD: 0.8 %
BILIRUB SERPL-MCNC: 0.4 MG/DL
BUN SERPL-MCNC: 12 MG/DL
CALCIUM SERPL-MCNC: 9.2 MG/DL
CHLORIDE SERPL-SCNC: 104 MMOL/L
CO2 SERPL-SCNC: 27 MMOL/L
CREAT SERPL-MCNC: 0.8 MG/DL
DIFFERENTIAL METHOD: ABNORMAL
EOSINOPHIL # BLD AUTO: 0.4 K/UL
EOSINOPHIL NFR BLD: 4.8 %
ERYTHROCYTE [DISTWIDTH] IN BLOOD BY AUTOMATED COUNT: 14 %
EST. GFR  (AFRICAN AMERICAN): >60 ML/MIN/1.73 M^2
EST. GFR  (NON AFRICAN AMERICAN): >60 ML/MIN/1.73 M^2
ESTIMATED AVG GLUCOSE: 103 MG/DL
GLUCOSE SERPL-MCNC: 92 MG/DL
HBA1C MFR BLD HPLC: 5.2 %
HCT VFR BLD AUTO: 39.7 %
HGB BLD-MCNC: 12.2 G/DL
IMM GRANULOCYTES # BLD AUTO: 0.02 K/UL
IMM GRANULOCYTES NFR BLD AUTO: 0.3 %
INSULIN COLLECTION INTERVAL: NORMAL
INSULIN SERPL-ACNC: 6 UU/ML
LYMPHOCYTES # BLD AUTO: 1.8 K/UL
LYMPHOCYTES NFR BLD: 25.2 %
MCH RBC QN AUTO: 27.5 PG
MCHC RBC AUTO-ENTMCNC: 30.7 G/DL
MCV RBC AUTO: 90 FL
MONOCYTES # BLD AUTO: 0.5 K/UL
MONOCYTES NFR BLD: 6.8 %
NEUTROPHILS # BLD AUTO: 4.5 K/UL
NEUTROPHILS NFR BLD: 62.1 %
NRBC BLD-RTO: 0 /100 WBC
PLATELET # BLD AUTO: 398 K/UL
PMV BLD AUTO: 10.2 FL
POTASSIUM SERPL-SCNC: 4.2 MMOL/L
PROT SERPL-MCNC: 7.2 G/DL
RBC # BLD AUTO: 4.43 M/UL
SODIUM SERPL-SCNC: 139 MMOL/L
WBC # BLD AUTO: 7.22 K/UL

## 2019-01-29 PROCEDURE — 80053 COMPREHEN METABOLIC PANEL: CPT

## 2019-01-29 PROCEDURE — 85025 COMPLETE CBC W/AUTO DIFF WBC: CPT

## 2019-01-29 PROCEDURE — 36415 COLL VENOUS BLD VENIPUNCTURE: CPT | Mod: PO

## 2019-01-29 PROCEDURE — 83525 ASSAY OF INSULIN: CPT

## 2019-01-29 PROCEDURE — 83036 HEMOGLOBIN GLYCOSYLATED A1C: CPT

## 2019-02-04 ENCOUNTER — PATIENT OUTREACH (OUTPATIENT)
Dept: ADMINISTRATIVE | Facility: HOSPITAL | Age: 39
End: 2019-02-04

## 2019-02-04 NOTE — LETTER
February 4, 2019      We are seeing Niesha Abarca, 1980, at [facility]. Kuldip Bentley MD is their primary care physician. To help with our [facility] records could you please send the following:     MOST RECENT MAMMOGRAM     Please fax to [facility] at [phone], attention Ifeoma Syed.     Thank-you in advance for your assistance. If you have any questions or concerns please contact me at [phone].     Ifeoma FELIX LPN  Care Coordination Department  [facility]  [phone]

## 2019-02-05 ENCOUNTER — OFFICE VISIT (OUTPATIENT)
Dept: INTERNAL MEDICINE | Facility: CLINIC | Age: 39
End: 2019-02-05
Payer: COMMERCIAL

## 2019-02-05 VITALS
BODY MASS INDEX: 40.11 KG/M2 | RESPIRATION RATE: 16 BRPM | HEART RATE: 80 BPM | TEMPERATURE: 97 F | HEIGHT: 66 IN | SYSTOLIC BLOOD PRESSURE: 124 MMHG | WEIGHT: 249.56 LBS | DIASTOLIC BLOOD PRESSURE: 82 MMHG

## 2019-02-05 DIAGNOSIS — E88.819 INSULIN RESISTANCE: Primary | ICD-10-CM

## 2019-02-05 DIAGNOSIS — I10 HYPERTENSION, UNSPECIFIED TYPE: ICD-10-CM

## 2019-02-05 DIAGNOSIS — E66.01 OBESITY, MORBID, BMI 40.0-49.9: ICD-10-CM

## 2019-02-05 PROCEDURE — 99214 OFFICE O/P EST MOD 30 MIN: CPT | Mod: S$GLB,,, | Performed by: NURSE PRACTITIONER

## 2019-02-05 PROCEDURE — 3008F PR BODY MASS INDEX (BMI) DOCUMENTED: ICD-10-PCS | Mod: CPTII,S$GLB,, | Performed by: NURSE PRACTITIONER

## 2019-02-05 PROCEDURE — 3074F SYST BP LT 130 MM HG: CPT | Mod: CPTII,S$GLB,, | Performed by: NURSE PRACTITIONER

## 2019-02-05 PROCEDURE — 3008F BODY MASS INDEX DOCD: CPT | Mod: CPTII,S$GLB,, | Performed by: NURSE PRACTITIONER

## 2019-02-05 PROCEDURE — 99999 PR PBB SHADOW E&M-EST. PATIENT-LVL III: CPT | Mod: PBBFAC,,, | Performed by: NURSE PRACTITIONER

## 2019-02-05 PROCEDURE — 3074F PR MOST RECENT SYSTOLIC BLOOD PRESSURE < 130 MM HG: ICD-10-PCS | Mod: CPTII,S$GLB,, | Performed by: NURSE PRACTITIONER

## 2019-02-05 PROCEDURE — 3079F PR MOST RECENT DIASTOLIC BLOOD PRESSURE 80-89 MM HG: ICD-10-PCS | Mod: CPTII,S$GLB,, | Performed by: NURSE PRACTITIONER

## 2019-02-05 PROCEDURE — 3079F DIAST BP 80-89 MM HG: CPT | Mod: CPTII,S$GLB,, | Performed by: NURSE PRACTITIONER

## 2019-02-05 PROCEDURE — 99999 PR PBB SHADOW E&M-EST. PATIENT-LVL III: ICD-10-PCS | Mod: PBBFAC,,, | Performed by: NURSE PRACTITIONER

## 2019-02-05 PROCEDURE — 99214 PR OFFICE/OUTPT VISIT, EST, LEVL IV, 30-39 MIN: ICD-10-PCS | Mod: S$GLB,,, | Performed by: NURSE PRACTITIONER

## 2019-02-05 RX ORDER — METFORMIN HYDROCHLORIDE 500 MG/1
500 TABLET, EXTENDED RELEASE ORAL 2 TIMES DAILY WITH MEALS
Qty: 180 TABLET | Refills: 3 | Status: SHIPPED | OUTPATIENT
Start: 2019-02-05 | End: 2019-07-24

## 2019-02-05 NOTE — PROGRESS NOTES
"Subjective:       Patient ID: Niesha Abarca is a 38 y.o. female.    Chief Complaint: Follow-up    Patient comes in for lab review and follow up. She had a hx of preeclampsia with her last pregnancy and was started on procardia. Baby is now 3 months. Dr. GONZALEZ changed meds last visit. She has been taking as prescribed. Admits home readings are 130s/80s. Doing well with wellbutrin. Has a hx of IR. Had gestational diabetes. She is eating pretty healthy and not sure why she is not loosing weight. She is wanting to discuss restarting metformin to help with weight loss while doing diet/exercise. She is using condoms for birth control. She is aware metformin can cause increased likely tanner of pregnancy. She will continue condoms. Not desiring pregnancy. Does not want any more kids. Will discuss vasectomy with  or paraguard from ob. Not really wanting OCP's. Does not like putting hormones through her body.    Wt Readings from Last 10 Encounters:  02/05/19 : 113.2 kg (249 lb 9 oz)  12/18/18 : 109 kg (240 lb 4.8 oz)  11/20/17 : 112.6 kg (248 lb 3.8 oz)  06/29/17 : 109.1 kg (240 lb 8.4 oz)  06/01/17 : 109.9 kg (242 lb 4.6 oz)  04/25/17 : 99.8 kg (220 lb)            Hypertension   This is a chronic problem. The current episode started more than 1 month ago. The problem has been waxing and waning since onset. The problem is controlled. Associated symptoms include anxiety, headaches and malaise/fatigue. Pertinent negatives include no blurred vision, chest pain, neck pain, orthopnea, palpitations, peripheral edema, PND, shortness of breath or sweats. Risk factors for coronary artery disease include dyslipidemia, obesity and stress. There are no compliance problems.        /82 (BP Location: Left arm, Patient Position: Sitting, BP Method: Medium (Automatic))   Pulse 80   Temp 96.9 °F (36.1 °C) (Tympanic)   Resp 16   Ht 5' 5.5" (1.664 m)   Wt 113.2 kg (249 lb 9 oz)   LMP 01/22/2019 (Approximate)   BMI 40.90 " kg/m²     Review of Systems   Constitutional: Positive for appetite change, malaise/fatigue and unexpected weight change. Negative for activity change, chills, diaphoresis, fatigue and fever.   HENT: Negative.    Eyes: Negative for blurred vision and visual disturbance.   Respiratory: Negative for cough, shortness of breath and wheezing.    Cardiovascular: Negative for chest pain, palpitations, orthopnea, leg swelling and PND.   Gastrointestinal: Negative for abdominal distention, abdominal pain, anal bleeding, blood in stool, constipation, diarrhea, nausea, rectal pain and vomiting.   Genitourinary: Negative for decreased urine volume, difficulty urinating, dysuria, frequency, hematuria and urgency.   Musculoskeletal: Negative for neck pain.   Skin: Negative for color change, pallor, rash and wound.   Neurological: Positive for headaches. Negative for dizziness, seizures, syncope, facial asymmetry, speech difficulty, light-headedness and numbness.   Psychiatric/Behavioral: Negative for agitation, confusion and hallucinations. The patient is not nervous/anxious.        Objective:      Physical Exam   Constitutional: She is oriented to person, place, and time. She appears well-developed and well-nourished. She is cooperative. No distress.   HENT:   Head: Normocephalic and atraumatic.   Eyes: Conjunctivae are normal. Right eye exhibits no discharge. Left eye exhibits no discharge.   Cardiovascular: Normal rate, regular rhythm and normal heart sounds.   No murmur heard.  Pulmonary/Chest: Effort normal and breath sounds normal. No respiratory distress. She has no wheezes. She has no rales. She exhibits no tenderness.   Abdominal: Soft. She exhibits no distension.   Musculoskeletal: Normal range of motion.   Neurological: She is alert and oriented to person, place, and time.   Skin: Skin is warm and dry. No rash noted. She is not diaphoretic.   Psychiatric: She has a normal mood and affect. Her behavior is normal.  Judgment and thought content normal.   Nursing note and vitals reviewed.      Assessment:       1. Insulin resistance    2. Hypertension, unspecified type    3. Obesity, morbid, BMI 40.0-49.9        Plan:       Niesha was seen today for follow-up.    Diagnoses and all orders for this visit:    Insulin resistance  -     metFORMIN (GLUCOPHAGE-XR) 500 MG 24 hr tablet; Take 1 tablet (500 mg total) by mouth 2 (two) times daily with meals.    Hypertension, unspecified type    Obesity, morbid, BMI 40.0-49.9    doing well on labetol  Wants to restart metformin. Has normal fasting insulin recently but symptomatic, having issues loosing weight despite exercise  Will work on diet/increase exercise  Discussed IR diet in detail including limiting carbs  Continue labetalol, bp diary at home three times a week  Follow up with me in 6 weeks for weight/metformin

## 2019-02-27 RX ORDER — BUPROPION HYDROCHLORIDE 150 MG/1
TABLET ORAL
Qty: 90 TABLET | Refills: 1 | Status: SHIPPED | OUTPATIENT
Start: 2019-02-27 | End: 2020-07-28 | Stop reason: SDUPTHER

## 2019-07-11 RX ORDER — LABETALOL 100 MG/1
TABLET, FILM COATED ORAL
Qty: 60 TABLET | Refills: 6 | Status: SHIPPED | OUTPATIENT
Start: 2019-07-11 | End: 2019-07-24

## 2019-07-19 ENCOUNTER — PATIENT MESSAGE (OUTPATIENT)
Dept: INTERNAL MEDICINE | Facility: CLINIC | Age: 39
End: 2019-07-19

## 2019-07-19 DIAGNOSIS — I10 HYPERTENSION, UNSPECIFIED TYPE: ICD-10-CM

## 2019-07-19 DIAGNOSIS — E78.5 HYPERLIPIDEMIA, UNSPECIFIED HYPERLIPIDEMIA TYPE: ICD-10-CM

## 2019-07-19 DIAGNOSIS — Z82.49 FAMILY HX OF ISCHEM HEART DIS AND OTH DIS OF THE CIRC SYS: Primary | ICD-10-CM

## 2019-07-19 NOTE — TELEPHONE ENCOUNTER
Pt would like to see Cardiology for evaluation due to early heart disease/death in family and her own HTN/hyperlipidemia

## 2019-07-23 DIAGNOSIS — Z82.49 FAMILY HISTORY OF HEART DISEASE: Primary | ICD-10-CM

## 2019-07-24 ENCOUNTER — OFFICE VISIT (OUTPATIENT)
Dept: CARDIOLOGY | Facility: CLINIC | Age: 39
End: 2019-07-24
Payer: COMMERCIAL

## 2019-07-24 ENCOUNTER — CLINICAL SUPPORT (OUTPATIENT)
Dept: CARDIOLOGY | Facility: CLINIC | Age: 39
End: 2019-07-24
Payer: COMMERCIAL

## 2019-07-24 VITALS
SYSTOLIC BLOOD PRESSURE: 130 MMHG | HEIGHT: 66 IN | HEART RATE: 74 BPM | DIASTOLIC BLOOD PRESSURE: 84 MMHG | WEIGHT: 251 LBS | BODY MASS INDEX: 40.34 KG/M2

## 2019-07-24 DIAGNOSIS — R07.89 OTHER CHEST PAIN: ICD-10-CM

## 2019-07-24 DIAGNOSIS — E78.2 MIXED HYPERLIPIDEMIA: ICD-10-CM

## 2019-07-24 DIAGNOSIS — Z82.49 FAMILY HISTORY OF EARLY CAD: ICD-10-CM

## 2019-07-24 DIAGNOSIS — I10 ESSENTIAL HYPERTENSION: Primary | ICD-10-CM

## 2019-07-24 DIAGNOSIS — Z82.49 FAMILY HISTORY OF HEART DISEASE: ICD-10-CM

## 2019-07-24 PROBLEM — E88.819 INSULIN RESISTANCE: Status: RESOLVED | Noted: 2019-07-24 | Resolved: 2019-07-24

## 2019-07-24 PROBLEM — E88.819 INSULIN RESISTANCE: Status: ACTIVE | Noted: 2019-07-24

## 2019-07-24 PROCEDURE — 93000 EKG 12-LEAD: ICD-10-PCS | Mod: S$GLB,,, | Performed by: INTERNAL MEDICINE

## 2019-07-24 PROCEDURE — 99999 PR PBB SHADOW E&M-EST. PATIENT-LVL III: ICD-10-PCS | Mod: PBBFAC,,, | Performed by: INTERNAL MEDICINE

## 2019-07-24 PROCEDURE — 93000 ELECTROCARDIOGRAM COMPLETE: CPT | Mod: S$GLB,,, | Performed by: INTERNAL MEDICINE

## 2019-07-24 PROCEDURE — 99244 OFF/OP CNSLTJ NEW/EST MOD 40: CPT | Mod: S$GLB,,, | Performed by: INTERNAL MEDICINE

## 2019-07-24 PROCEDURE — 99244 PR OFFICE CONSULTATION,LEVEL IV: ICD-10-PCS | Mod: S$GLB,,, | Performed by: INTERNAL MEDICINE

## 2019-07-24 PROCEDURE — 99999 PR PBB SHADOW E&M-EST. PATIENT-LVL III: CPT | Mod: PBBFAC,,, | Performed by: INTERNAL MEDICINE

## 2019-07-24 RX ORDER — EZETIMIBE 10 MG/1
10 TABLET ORAL DAILY
Qty: 90 TABLET | Refills: 3 | Status: SHIPPED | OUTPATIENT
Start: 2019-07-24 | End: 2020-02-24

## 2019-07-24 RX ORDER — AMLODIPINE BESYLATE 5 MG/1
5 TABLET ORAL DAILY
Qty: 30 TABLET | Refills: 3 | Status: SHIPPED | OUTPATIENT
Start: 2019-07-24 | End: 2020-01-02 | Stop reason: SDUPTHER

## 2019-07-24 NOTE — PROGRESS NOTES
Subjective:   Patient ID:  Niesha Abarca is a 38 y.o. female who presents for cardiac consult of Fatigue; Consult; and Establish Care      HPI  The patient came in today for cardiac consult of Fatigue; Consult; and Establish Care    Referring Physician: Kuldip Bentley MD   Reason for consult: CVD Risk, HLD    19  Niesha Abarca is a 38 y.o. female pt with HTN, HLD, Pre-ecclamsia, insulin resistance,  obesity, FH CAD, prior smoker presents for initial CV evaluation of HLD, CV risk factor assessment.     She had pre-ecclampsia - Oct 2018 and has had high BP since then. She was on Procardia initially and now has been on Labetolol. Has had more fatigue - will change BB to CCB.   Maternal grandmother  at age 39, recently found out info as she was adopted. She was on statin in the past and felt muscle pain/fatigue and took months to feel normal. Was taking metformin before for insulin resistance - A1c normal now. Occ L side atypical pain, unsure if anxiety caused. No exertional CP.      Patient feels  no sob, no leg swelling, no PND, no palpitation, no dizziness, no syncope, no CNS symptoms.    Patient has fairly good exercise tolerance.    Patient is compliant with medications.    FH - early CAD - maternal GM    ECG - NSR    Past Medical History:   Diagnosis Date    Essential hypertension 2019    Hyperlipidemia     IBS (irritable bowel syndrome)        Past Surgical History:   Procedure Laterality Date    APPENDECTOMY       SECTION      WISDOM TOOTH EXTRACTION         Social History     Tobacco Use    Smoking status: Former Smoker     Packs/day: 0.50     Years: 15.00     Pack years: 7.50     Types: Cigarettes     Last attempt to quit: 2018     Years since quittin.2    Smokeless tobacco: Never Used   Substance Use Topics    Alcohol use: Yes     Alcohol/week: 1.2 oz     Types: 2 Glasses of wine per week     Comment: 2 glasses wine nightly    Drug use: No  "      Family History   Adopted: Yes       Patient's Medications   New Prescriptions    AMLODIPINE (NORVASC) 5 MG TABLET    Take 1 tablet (5 mg total) by mouth once daily.    EZETIMIBE (ZETIA) 10 MG TABLET    Take 1 tablet (10 mg total) by mouth once daily.   Previous Medications    BUPROPION (WELLBUTRIN XL) 150 MG TB24 TABLET    TAKE 1 TABLET BY MOUTH EVERY DAY   Modified Medications    No medications on file   Discontinued Medications    LABETALOL (NORMODYNE) 100 MG TABLET    TAKE 1 TABLET(100 MG) BY MOUTH TWICE DAILY    METFORMIN (GLUCOPHAGE-XR) 500 MG 24 HR TABLET    Take 1 tablet (500 mg total) by mouth 2 (two) times daily with meals.       Review of Systems   Constitutional: Negative.    HENT: Negative.    Eyes: Negative.    Respiratory: Negative.    Cardiovascular: Negative.    Gastrointestinal: Negative.    Genitourinary: Negative.    Musculoskeletal: Negative.    Skin: Negative.    Neurological: Negative.    Endo/Heme/Allergies: Negative.    Psychiatric/Behavioral: Negative.    All 12 systems otherwise negative.      Wt Readings from Last 3 Encounters:   07/24/19 113.9 kg (251 lb)   02/05/19 113.2 kg (249 lb 9 oz)   12/18/18 109 kg (240 lb 4.8 oz)     Temp Readings from Last 3 Encounters:   02/05/19 96.9 °F (36.1 °C) (Tympanic)   12/18/18 97.8 °F (36.6 °C) (Tympanic)   11/20/17 98 °F (36.7 °C)     BP Readings from Last 3 Encounters:   07/24/19 130/84   02/05/19 124/82   12/18/18 (!) 140/96     Pulse Readings from Last 3 Encounters:   07/24/19 74   02/05/19 80   12/18/18 88       /84 (BP Location: Right arm, Patient Position: Sitting, BP Method: Medium (Manual))   Pulse 74   Ht 5' 5.5" (1.664 m)   Wt 113.9 kg (251 lb)   BMI 41.13 kg/m²     Objective:   Physical Exam   Constitutional: She is oriented to person, place, and time. She appears well-developed and well-nourished. No distress.   HENT:   Head: Normocephalic and atraumatic.   Nose: Nose normal.   Mouth/Throat: Oropharynx is clear and moist. "   Eyes: Conjunctivae and EOM are normal. No scleral icterus.   Neck: Normal range of motion. Neck supple. No JVD present. No thyromegaly present.   Cardiovascular: Normal rate, regular rhythm, S1 normal and S2 normal. Exam reveals no gallop, no S3, no S4 and no friction rub.   No murmur heard.  Pulmonary/Chest: Effort normal and breath sounds normal. No stridor. No respiratory distress. She has no wheezes. She has no rales. She exhibits no tenderness.   Abdominal: Soft. Bowel sounds are normal. She exhibits no distension and no mass. There is no tenderness. There is no rebound.   Genitourinary:   Genitourinary Comments: Deferred   Musculoskeletal: Normal range of motion. She exhibits no edema, tenderness or deformity.   Lymphadenopathy:     She has no cervical adenopathy.   Neurological: She is alert and oriented to person, place, and time. She exhibits normal muscle tone. Coordination normal.   Skin: Skin is warm and dry. No rash noted. She is not diaphoretic. No erythema. No pallor.   Psychiatric: She has a normal mood and affect. Her behavior is normal. Judgment and thought content normal.   Nursing note and vitals reviewed.      Lab Results   Component Value Date     01/29/2019    K 4.2 01/29/2019     01/29/2019    CO2 27 01/29/2019    BUN 12 01/29/2019    CREATININE 0.8 01/29/2019    GLU 92 01/29/2019    HGBA1C 5.2 01/29/2019    AST 19 01/29/2019    ALT 42 01/29/2019    ALBUMIN 3.5 01/29/2019    PROT 7.2 01/29/2019    BILITOT 0.4 01/29/2019    WBC 7.22 01/29/2019    HGB 12.2 01/29/2019    HCT 39.7 01/29/2019    MCV 90 01/29/2019     (H) 01/29/2019    TSH 1.578 06/15/2017    CHOL 302 (H) 06/15/2017    HDL 47 06/15/2017    LDLCALC 230.2 (H) 06/15/2017    TRIG 124 06/15/2017     Assessment:      1. Essential hypertension    2. Mixed hyperlipidemia    3. Family history of early CAD    4. Other chest pain        Plan:   1. HTN  - change to Norvasc due to fatigue with BB  - low salt diet  - weight  loss    2. 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 Insulin resistance - no longer based on recent A1c  - cont meds per PCP    4. Obesity  - need weight loss with diet/exercise     5. Chest pain with significant RFs and FH  - r/o ischemia with stress test  - 2 D ECHO    Thank you for allowing me to participate in this patient's care. Please do not hesitate to contact me with any questions or concerns. Consult note has been forwarded to the referral physician.

## 2019-07-24 NOTE — LETTER
July 24, 2019      Kuldip Bentley MD  [address] [place] [address]           [facility]  [address]  [place] [address]  Phone: [phone]  Fax: [phone]          Patient: Niesha Abarca   MR Number: [id number]   YOB: 1980   Date of Visit: 7/24/2019       Dear Dr. Kuldip Bentley:    Thank you for referring Niesha Abarca to me for evaluation. Attached you will find relevant portions of my assessment and plan of care.    If you have questions, please do not hesitate to call me. I look forward to following Niesha Abarca along with you.    Sincerely,    Otf Tirado MD    Enclosure  CC:  No Recipients    If you would like to receive this communication electronically, please contact [email or web address] or [phone] to request more information on [employer] Link access.    For providers and/or their staff who would like to refer a patient to [facility], please contact us through our one-stop-shop provider referral line, [facility], at [phone].    If you feel you have received this communication in error or would no longer like to receive these types of communications, please e-mail [email or web address]

## 2019-07-25 ENCOUNTER — LAB VISIT (OUTPATIENT)
Dept: LAB | Facility: HOSPITAL | Age: 39
End: 2019-07-25
Attending: INTERNAL MEDICINE
Payer: COMMERCIAL

## 2019-07-25 DIAGNOSIS — E78.2 MIXED HYPERLIPIDEMIA: ICD-10-CM

## 2019-07-25 LAB
CHOLEST SERPL-MCNC: 261 MG/DL (ref 120–199)
CHOLEST/HDLC SERPL: 5.6 {RATIO} (ref 2–5)
HDLC SERPL-MCNC: 47 MG/DL (ref 40–75)
HDLC SERPL: 18 % (ref 20–50)
LDLC SERPL CALC-MCNC: 191.6 MG/DL (ref 63–159)
NONHDLC SERPL-MCNC: 214 MG/DL
TRIGL SERPL-MCNC: 112 MG/DL (ref 30–150)

## 2019-07-25 PROCEDURE — 80061 LIPID PANEL: CPT

## 2019-07-25 PROCEDURE — 36415 COLL VENOUS BLD VENIPUNCTURE: CPT | Mod: PO

## 2019-07-31 ENCOUNTER — PATIENT MESSAGE (OUTPATIENT)
Dept: CARDIOLOGY | Facility: CLINIC | Age: 39
End: 2019-07-31

## 2019-08-02 ENCOUNTER — CLINICAL SUPPORT (OUTPATIENT)
Dept: CARDIOLOGY | Facility: CLINIC | Age: 39
End: 2019-08-02
Attending: INTERNAL MEDICINE
Payer: COMMERCIAL

## 2019-08-02 ENCOUNTER — TELEPHONE (OUTPATIENT)
Dept: INTERNAL MEDICINE | Facility: CLINIC | Age: 39
End: 2019-08-02

## 2019-08-02 DIAGNOSIS — R07.89 OTHER CHEST PAIN: ICD-10-CM

## 2019-08-02 DIAGNOSIS — Z82.49 FAMILY HISTORY OF EARLY CAD: ICD-10-CM

## 2019-08-02 PROCEDURE — 93015 CV STRESS TEST SUPVJ I&R: CPT | Mod: S$GLB,,, | Performed by: INTERNAL MEDICINE

## 2019-08-02 PROCEDURE — 93015 CARDIAC TREADMILL STRESS TEST: ICD-10-PCS | Mod: S$GLB,,, | Performed by: INTERNAL MEDICINE

## 2019-08-03 LAB — DIASTOLIC DYSFUNCTION: NO

## 2019-08-05 ENCOUNTER — CLINICAL SUPPORT (OUTPATIENT)
Dept: CARDIOLOGY | Facility: CLINIC | Age: 39
End: 2019-08-05
Attending: INTERNAL MEDICINE
Payer: COMMERCIAL

## 2019-08-05 DIAGNOSIS — Z82.49 FAMILY HISTORY OF EARLY CAD: ICD-10-CM

## 2019-08-05 DIAGNOSIS — R07.89 OTHER CHEST PAIN: ICD-10-CM

## 2019-08-05 LAB
DIASTOLIC DYSFUNCTION: NO
ESTIMATED PA SYSTOLIC PRESSURE: 22.89
RETIRED EF AND QEF - SEE NOTES: 55 (ref 55–65)

## 2019-08-05 PROCEDURE — 93306 2D ECHO WITH COLOR FLOW DOPPLER: ICD-10-PCS | Mod: S$GLB,,, | Performed by: NUCLEAR MEDICINE

## 2019-08-05 PROCEDURE — 93306 TTE W/DOPPLER COMPLETE: CPT | Mod: S$GLB,,, | Performed by: NUCLEAR MEDICINE

## 2019-09-04 ENCOUNTER — OFFICE VISIT (OUTPATIENT)
Dept: CARDIOLOGY | Facility: CLINIC | Age: 39
End: 2019-09-04
Payer: COMMERCIAL

## 2019-09-04 VITALS
SYSTOLIC BLOOD PRESSURE: 128 MMHG | WEIGHT: 248.88 LBS | DIASTOLIC BLOOD PRESSURE: 86 MMHG | HEART RATE: 96 BPM | BODY MASS INDEX: 40.79 KG/M2

## 2019-09-04 DIAGNOSIS — Z82.49 FAMILY HISTORY OF EARLY CAD: ICD-10-CM

## 2019-09-04 DIAGNOSIS — E66.9 OBESITY (BMI 30-39.9): ICD-10-CM

## 2019-09-04 DIAGNOSIS — R07.9 CHEST PAIN, UNSPECIFIED TYPE: Primary | ICD-10-CM

## 2019-09-04 DIAGNOSIS — E78.2 MIXED HYPERLIPIDEMIA: ICD-10-CM

## 2019-09-04 DIAGNOSIS — I10 ESSENTIAL HYPERTENSION: ICD-10-CM

## 2019-09-04 PROCEDURE — 3079F PR MOST RECENT DIASTOLIC BLOOD PRESSURE 80-89 MM HG: ICD-10-PCS | Mod: CPTII,S$GLB,, | Performed by: INTERNAL MEDICINE

## 2019-09-04 PROCEDURE — 99999 PR PBB SHADOW E&M-EST. PATIENT-LVL III: ICD-10-PCS | Mod: PBBFAC,,, | Performed by: INTERNAL MEDICINE

## 2019-09-04 PROCEDURE — 3079F DIAST BP 80-89 MM HG: CPT | Mod: CPTII,S$GLB,, | Performed by: INTERNAL MEDICINE

## 2019-09-04 PROCEDURE — 3074F PR MOST RECENT SYSTOLIC BLOOD PRESSURE < 130 MM HG: ICD-10-PCS | Mod: CPTII,S$GLB,, | Performed by: INTERNAL MEDICINE

## 2019-09-04 PROCEDURE — 3008F PR BODY MASS INDEX (BMI) DOCUMENTED: ICD-10-PCS | Mod: CPTII,S$GLB,, | Performed by: INTERNAL MEDICINE

## 2019-09-04 PROCEDURE — 99999 PR PBB SHADOW E&M-EST. PATIENT-LVL III: CPT | Mod: PBBFAC,,, | Performed by: INTERNAL MEDICINE

## 2019-09-04 PROCEDURE — 99214 PR OFFICE/OUTPT VISIT, EST, LEVL IV, 30-39 MIN: ICD-10-PCS | Mod: S$GLB,,, | Performed by: INTERNAL MEDICINE

## 2019-09-04 PROCEDURE — 99214 OFFICE O/P EST MOD 30 MIN: CPT | Mod: S$GLB,,, | Performed by: INTERNAL MEDICINE

## 2019-09-04 PROCEDURE — 3008F BODY MASS INDEX DOCD: CPT | Mod: CPTII,S$GLB,, | Performed by: INTERNAL MEDICINE

## 2019-09-04 PROCEDURE — 3074F SYST BP LT 130 MM HG: CPT | Mod: CPTII,S$GLB,, | Performed by: INTERNAL MEDICINE

## 2019-09-04 NOTE — PROGRESS NOTES
Subjective:   Patient ID:  Niesha Abarca is a 39 y.o. female who presents for cardiac consult of Hypertension (6 week f/u)      HPI  The patient came in today for cardiac consult of Hypertension (6 week f/u)    Referring Physician: Kuldip Bentley MD   Reason for initial consult: CVD Risk, HLD    Niesha Abarca is a 39 y.o. female pt with HTN, HLD, Pre-ecclamsia, insulin resistance,  obesity, FH CAD, prior smoker presents for follow up CV evaluation.    19  She had pre-ecclampsia - Oct 2018 and has had high BP since then. She was on Procardia initially and now has been on Labetolol. Has had more fatigue - will change BB to CCB. Maternal grandmother  at age 39, recently found out info as she was adopted. She was on statin in the past and felt muscle pain/fatigue and took months to feel normal. Was taking metformin before for insulin resistance - A1c normal now. Occ L side atypical pain, unsure if anxiety caused. No exertional CP.      19  ECG stress neg for ischemia but HTN up to 230s/60s. ECHO overall normal. , on Zetia will start. Was on simvastatin in the past. No CP.     Patient feels no sob, no leg swelling, no PND, no palpitation, no dizziness, no syncope, no CNS symptoms.    Patient has fairly good exercise tolerance.    Patient is compliant with medications.    FH - early CAD - maternal GM    ECG - NSR      EKG Conclusions:    1. The EKG portion of this study is negative for ischemia at a moderate workload, and peak heart rate of 164 bpm (94% of predicted).   2. Exercise capacity is average.   3. Blood pressure response to exercise was hypertensive (Presenting BP: 147/82 Peak BP: 234/60).   4. No significant arrhythmias were present.   5. There were no symptoms of chest discomfort or significant dyspnea throughout the protocol.   6. The Duke treadmill score was 7 suggesting a low probability for future cardiovascular events.    This document has been electronically     SIGNED BY: Bayron Rankin MD On: 2019 06:43    CONCLUSIONS     1 - No wall motion abnormalities.     2 - Normal left ventricular systolic function (EF 55-60%).     3 - Normal left ventricular diastolic function.     4 - Normal right ventricular systolic function .     5 - The estimated PA systolic pressure is 23 mmHg.       This document has been electronically    SIGNED BY: Hola Jerry MD On: 2019 15:16    Past Medical History:   Diagnosis Date    Essential hypertension 2019    Hyperlipidemia     IBS (irritable bowel syndrome)        Past Surgical History:   Procedure Laterality Date    APPENDECTOMY       SECTION      WISDOM TOOTH EXTRACTION         Social History     Tobacco Use    Smoking status: Former Smoker     Packs/day: 0.50     Years: 15.00     Pack years: 7.50     Types: Cigarettes     Last attempt to quit: 2018     Years since quittin.3    Smokeless tobacco: Never Used   Substance Use Topics    Alcohol use: Not Currently    Drug use: No       Family History   Adopted: Yes       Patient's Medications   New Prescriptions    No medications on file   Previous Medications    AMLODIPINE (NORVASC) 5 MG TABLET    Take 1 tablet (5 mg total) by mouth once daily.    BUPROPION (WELLBUTRIN XL) 150 MG TB24 TABLET    TAKE 1 TABLET BY MOUTH EVERY DAY    EZETIMIBE (ZETIA) 10 MG TABLET    Take 1 tablet (10 mg total) by mouth once daily.   Modified Medications    No medications on file   Discontinued Medications    No medications on file       Review of Systems   Constitutional: Negative.    HENT: Negative.    Eyes: Negative.    Respiratory: Negative.    Cardiovascular: Negative.    Gastrointestinal: Negative.    Genitourinary: Negative.    Musculoskeletal: Negative.    Skin: Negative.    Neurological: Negative.    Endo/Heme/Allergies: Negative.    Psychiatric/Behavioral: Negative.    All 12 systems otherwise negative.      Wt Readings from Last 3 Encounters:    09/04/19 112.9 kg (248 lb 14.4 oz)   07/24/19 113.9 kg (251 lb)   02/05/19 113.2 kg (249 lb 9 oz)     Temp Readings from Last 3 Encounters:   02/05/19 96.9 °F (36.1 °C) (Tympanic)   12/18/18 97.8 °F (36.6 °C) (Tympanic)   11/20/17 98 °F (36.7 °C)     BP Readings from Last 3 Encounters:   09/04/19 128/86   07/24/19 130/84   02/05/19 124/82     Pulse Readings from Last 3 Encounters:   09/04/19 96   07/24/19 74   02/05/19 80       /86 (BP Location: Left arm, Patient Position: Sitting, BP Method: Medium (Manual))   Pulse 96   Wt 112.9 kg (248 lb 14.4 oz)   BMI 40.79 kg/m²     Objective:   Physical Exam   Constitutional: She is oriented to person, place, and time. She appears well-developed and well-nourished. No distress.   HENT:   Head: Normocephalic and atraumatic.   Nose: Nose normal.   Mouth/Throat: Oropharynx is clear and moist.   Eyes: Conjunctivae and EOM are normal. No scleral icterus.   Neck: Normal range of motion. Neck supple. No JVD present. No thyromegaly present.   Cardiovascular: Normal rate, regular rhythm, S1 normal and S2 normal. Exam reveals no gallop, no S3, no S4 and no friction rub.   No murmur heard.  Pulmonary/Chest: Effort normal and breath sounds normal. No stridor. No respiratory distress. She has no wheezes. She has no rales. She exhibits no tenderness.   Abdominal: Soft. Bowel sounds are normal. She exhibits no distension and no mass. There is no tenderness. There is no rebound.   Genitourinary:   Genitourinary Comments: Deferred   Musculoskeletal: Normal range of motion. She exhibits no edema, tenderness or deformity.   Lymphadenopathy:     She has no cervical adenopathy.   Neurological: She is alert and oriented to person, place, and time. She exhibits normal muscle tone. Coordination normal.   Skin: Skin is warm and dry. No rash noted. She is not diaphoretic. No erythema. No pallor.   Psychiatric: She has a normal mood and affect. Her behavior is normal. Judgment and thought  content normal.   Nursing note and vitals reviewed.      Lab Results   Component Value Date     01/29/2019    K 4.2 01/29/2019     01/29/2019    CO2 27 01/29/2019    BUN 12 01/29/2019    CREATININE 0.8 01/29/2019    GLU 92 01/29/2019    HGBA1C 5.2 01/29/2019    AST 19 01/29/2019    ALT 42 01/29/2019    ALBUMIN 3.5 01/29/2019    PROT 7.2 01/29/2019    BILITOT 0.4 01/29/2019    WBC 7.22 01/29/2019    HGB 12.2 01/29/2019    HCT 39.7 01/29/2019    MCV 90 01/29/2019     (H) 01/29/2019    TSH 1.578 06/15/2017    CHOL 261 (H) 07/25/2019    HDL 47 07/25/2019    LDLCALC 191.6 (H) 07/25/2019    TRIG 112 07/25/2019     Assessment:      1. Chest pain, unspecified type    2. Essential hypertension    3. Mixed hyperlipidemia    4. Obesity (BMI 30-39.9)    5. Family history of early CAD        Plan:   1. HTN  - change to Norvasc due to fatigue with BB  - low salt diet  - weight loss    2. 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 Insulin resistance - no longer based on recent A1c  - cont meds per PCP    4. Obesity  - need weight loss with diet/exercise     5. Chest pain - resolved  - r/o ischemia with stress test - negative  - 2 D ECHO - normal    Thank you for allowing me to participate in this patient's care. Please do not hesitate to contact me with any questions or concerns. Consult note has been forwarded to the referral physician.

## 2020-01-02 RX ORDER — AMLODIPINE BESYLATE 5 MG/1
5 TABLET ORAL DAILY
Qty: 30 TABLET | Refills: 3 | Status: SHIPPED | OUTPATIENT
Start: 2020-01-02 | End: 2020-07-17 | Stop reason: SDUPTHER

## 2020-02-19 ENCOUNTER — LAB VISIT (OUTPATIENT)
Dept: LAB | Facility: HOSPITAL | Age: 40
End: 2020-02-19
Attending: INTERNAL MEDICINE
Payer: COMMERCIAL

## 2020-02-19 DIAGNOSIS — E78.2 MIXED HYPERLIPIDEMIA: ICD-10-CM

## 2020-02-19 LAB
CHOLEST SERPL-MCNC: 268 MG/DL (ref 120–199)
CHOLEST/HDLC SERPL: 5.6 {RATIO} (ref 2–5)
HDLC SERPL-MCNC: 48 MG/DL (ref 40–75)
HDLC SERPL: 17.9 % (ref 20–50)
LDLC SERPL CALC-MCNC: 200.8 MG/DL (ref 63–159)
NONHDLC SERPL-MCNC: 220 MG/DL
TRIGL SERPL-MCNC: 96 MG/DL (ref 30–150)

## 2020-02-19 PROCEDURE — 36415 COLL VENOUS BLD VENIPUNCTURE: CPT | Mod: PO

## 2020-02-19 PROCEDURE — 80061 LIPID PANEL: CPT

## 2020-02-24 ENCOUNTER — HOSPITAL ENCOUNTER (OUTPATIENT)
Dept: CARDIOLOGY | Facility: HOSPITAL | Age: 40
Discharge: HOME | End: 2020-02-24
Attending: INTERNAL MEDICINE
Payer: COMMERCIAL

## 2020-02-24 ENCOUNTER — OFFICE VISIT (OUTPATIENT)
Dept: CARDIOLOGY | Facility: CLINIC | Age: 40
End: 2020-02-24
Payer: COMMERCIAL

## 2020-02-24 VITALS
WEIGHT: 253 LBS | BODY MASS INDEX: 40.66 KG/M2 | HEART RATE: 84 BPM | HEIGHT: 66 IN | OXYGEN SATURATION: 97 % | SYSTOLIC BLOOD PRESSURE: 128 MMHG | DIASTOLIC BLOOD PRESSURE: 80 MMHG

## 2020-02-24 DIAGNOSIS — E66.9 OBESITY (BMI 30-39.9): ICD-10-CM

## 2020-02-24 DIAGNOSIS — E78.2 MIXED HYPERLIPIDEMIA: ICD-10-CM

## 2020-02-24 DIAGNOSIS — Z82.49 FAMILY HISTORY OF EARLY CAD: ICD-10-CM

## 2020-02-24 DIAGNOSIS — Z78.9 STATIN INTOLERANCE: ICD-10-CM

## 2020-02-24 DIAGNOSIS — R07.9 CHEST PAIN, UNSPECIFIED TYPE: ICD-10-CM

## 2020-02-24 DIAGNOSIS — I10 ESSENTIAL HYPERTENSION: Primary | ICD-10-CM

## 2020-02-24 DIAGNOSIS — I10 ESSENTIAL HYPERTENSION: ICD-10-CM

## 2020-02-24 PROCEDURE — 3074F PR MOST RECENT SYSTOLIC BLOOD PRESSURE < 130 MM HG: ICD-10-PCS | Mod: CPTII,S$GLB,, | Performed by: INTERNAL MEDICINE

## 2020-02-24 PROCEDURE — 3074F SYST BP LT 130 MM HG: CPT | Mod: CPTII,S$GLB,, | Performed by: INTERNAL MEDICINE

## 2020-02-24 PROCEDURE — 99214 OFFICE O/P EST MOD 30 MIN: CPT | Mod: 25,S$GLB,, | Performed by: INTERNAL MEDICINE

## 2020-02-24 PROCEDURE — 3008F BODY MASS INDEX DOCD: CPT | Mod: CPTII,S$GLB,, | Performed by: INTERNAL MEDICINE

## 2020-02-24 PROCEDURE — 99214 PR OFFICE/OUTPT VISIT, EST, LEVL IV, 30-39 MIN: ICD-10-PCS | Mod: 25,S$GLB,, | Performed by: INTERNAL MEDICINE

## 2020-02-24 PROCEDURE — 93010 ELECTROCARDIOGRAM REPORT: CPT | Mod: ,,, | Performed by: INTERNAL MEDICINE

## 2020-02-24 PROCEDURE — 3079F DIAST BP 80-89 MM HG: CPT | Mod: CPTII,S$GLB,, | Performed by: INTERNAL MEDICINE

## 2020-02-24 PROCEDURE — 93010 EKG 12-LEAD: ICD-10-PCS | Mod: ,,, | Performed by: INTERNAL MEDICINE

## 2020-02-24 PROCEDURE — 99999 PR PBB SHADOW E&M-EST. PATIENT-LVL III: ICD-10-PCS | Mod: PBBFAC,,, | Performed by: INTERNAL MEDICINE

## 2020-02-24 PROCEDURE — 93005 ELECTROCARDIOGRAM TRACING: CPT

## 2020-02-24 PROCEDURE — 3008F PR BODY MASS INDEX (BMI) DOCUMENTED: ICD-10-PCS | Mod: CPTII,S$GLB,, | Performed by: INTERNAL MEDICINE

## 2020-02-24 PROCEDURE — 99999 PR PBB SHADOW E&M-EST. PATIENT-LVL III: CPT | Mod: PBBFAC,,, | Performed by: INTERNAL MEDICINE

## 2020-02-24 PROCEDURE — 3079F PR MOST RECENT DIASTOLIC BLOOD PRESSURE 80-89 MM HG: ICD-10-PCS | Mod: CPTII,S$GLB,, | Performed by: INTERNAL MEDICINE

## 2020-02-24 NOTE — PROGRESS NOTES
Subjective:   Patient ID:  Niesha Abarca is a 39 y.o. female who presents for cardiac consult of Chest Pain (3 month f/u) and Essential hypertension      HPI  The patient came in today for cardiac consult of Chest Pain (3 month f/u) and Essential hypertension    Referring Physician: Kuldip Bentley MD   Reason for initial consult: CVD Risk, HLD    Niesha Abarca is a 39 y.o. female pt with HTN, HLD, Pre-ecclamsia, insulin resistance,  obesity, FH CAD, prior smoker presents for follow up CV evaluation.    19  She had pre-ecclampsia - Oct 2018 and has had high BP since then. She was on Procardia initially and now has been on Labetolol. Has had more fatigue - will change BB to CCB. Maternal grandmother  at age 39, recently found out info as she was adopted. She was on statin in the past and felt muscle pain/fatigue and took months to feel normal. Was taking metformin before for insulin resistance - A1c normal now. Occ L side atypical pain, unsure if anxiety caused. No exertional CP.      19  ECG stress neg for ischemia but HTN up to 230s/60s. ECHO overall normal. , on Zetia will start. Was on simvastatin in the past. No CP.     20  LDL remains 200 recent lipids. Zetia started last visit but could not tolerate, she has felt SOB/fatigue and after 6 weeks had to stop taking it. Breathing has improved, Repatha ordered.   ECG - NSR with sinus arrythmia     Patient feels no sob, no leg swelling, no PND, no palpitation, no dizziness, no syncope, no CNS symptoms.    Patient has fairly good exercise tolerance.    Patient is compliant with medications.    FH - early CAD - maternal GM    ECG - NSR      EKG Conclusions:    1. The EKG portion of this study is negative for ischemia at a moderate workload, and peak heart rate of 164 bpm (94% of predicted).   2. Exercise capacity is average.   3. Blood pressure response to exercise was hypertensive (Presenting BP: 147/82 Peak BP: 234/60).    4. No significant arrhythmias were present.   5. There were no symptoms of chest discomfort or significant dyspnea throughout the protocol.   6. The Duke treadmill score was 7 suggesting a low probability for future cardiovascular events.    This document has been electronically    SIGNED BY: Bayron Rankin MD On: 2019 06:43    CONCLUSIONS     1 - No wall motion abnormalities.     2 - Normal left ventricular systolic function (EF 55-60%).     3 - Normal left ventricular diastolic function.     4 - Normal right ventricular systolic function .     5 - The estimated PA systolic pressure is 23 mmHg.       This document has been electronically    SIGNED BY: Hola Jerry MD On: 2019 15:16    Past Medical History:   Diagnosis Date    Essential hypertension 2019    Hyperlipidemia     IBS (irritable bowel syndrome)        Past Surgical History:   Procedure Laterality Date    APPENDECTOMY       SECTION      WISDOM TOOTH EXTRACTION         Social History     Tobacco Use    Smoking status: Former Smoker     Packs/day: 0.50     Years: 15.00     Pack years: 7.50     Types: Cigarettes     Last attempt to quit: 2018     Years since quittin.8    Smokeless tobacco: Never Used   Substance Use Topics    Alcohol use: Not Currently    Drug use: No       Family History   Adopted: Yes       Patient's Medications   New Prescriptions    No medications on file   Previous Medications    AMLODIPINE (NORVASC) 5 MG TABLET    Take 1 tablet (5 mg total) by mouth once daily.    BUPROPION (WELLBUTRIN XL) 150 MG TB24 TABLET    TAKE 1 TABLET BY MOUTH EVERY DAY   Modified Medications    No medications on file   Discontinued Medications    EZETIMIBE (ZETIA) 10 MG TABLET    Take 1 tablet (10 mg total) by mouth once daily.       Review of Systems   Constitutional: Negative.    HENT: Negative.    Eyes: Negative.    Respiratory: Negative.    Cardiovascular: Negative.    Gastrointestinal: Negative.   "  Genitourinary: Negative.    Musculoskeletal: Negative.    Skin: Negative.    Neurological: Negative.    Endo/Heme/Allergies: Negative.    Psychiatric/Behavioral: Negative.    All 12 systems otherwise negative.      Wt Readings from Last 3 Encounters:   02/24/20 114.8 kg (253 lb)   09/04/19 112.9 kg (248 lb 14.4 oz)   07/24/19 113.9 kg (251 lb)     Temp Readings from Last 3 Encounters:   02/05/19 96.9 °F (36.1 °C) (Tympanic)   12/18/18 97.8 °F (36.6 °C) (Tympanic)   11/20/17 98 °F (36.7 °C)     BP Readings from Last 3 Encounters:   02/24/20 128/80   09/04/19 128/86   07/24/19 130/84     Pulse Readings from Last 3 Encounters:   02/24/20 84   09/04/19 96   07/24/19 74       /80 (BP Location: Right arm, Patient Position: Sitting, BP Method: Large (Manual))   Pulse 84   Ht 5' 5.5" (1.664 m)   Wt 114.8 kg (253 lb)   SpO2 97%   BMI 41.46 kg/m²     Objective:   Physical Exam   Constitutional: She is oriented to person, place, and time. She appears well-developed and well-nourished. No distress.   HENT:   Head: Normocephalic and atraumatic.   Nose: Nose normal.   Mouth/Throat: Oropharynx is clear and moist.   Eyes: Conjunctivae and EOM are normal. No scleral icterus.   Neck: Normal range of motion. Neck supple. No JVD present. No thyromegaly present.   Cardiovascular: Normal rate, regular rhythm, S1 normal and S2 normal. Exam reveals no gallop, no S3, no S4 and no friction rub.   No murmur heard.  Pulmonary/Chest: Effort normal and breath sounds normal. No stridor. No respiratory distress. She has no wheezes. She has no rales. She exhibits no tenderness.   Abdominal: Soft. Bowel sounds are normal. She exhibits no distension and no mass. There is no tenderness. There is no rebound.   Genitourinary:   Genitourinary Comments: Deferred   Musculoskeletal: Normal range of motion. She exhibits no edema, tenderness or deformity.   Lymphadenopathy:     She has no cervical adenopathy.   Neurological: She is alert and " oriented to person, place, and time. She exhibits normal muscle tone. Coordination normal.   Skin: Skin is warm and dry. No rash noted. She is not diaphoretic. No erythema. No pallor.   Psychiatric: She has a normal mood and affect. Her behavior is normal. Judgment and thought content normal.   Nursing note and vitals reviewed.      Lab Results   Component Value Date     01/29/2019    K 4.2 01/29/2019     01/29/2019    CO2 27 01/29/2019    BUN 12 01/29/2019    CREATININE 0.8 01/29/2019    GLU 92 01/29/2019    HGBA1C 5.2 01/29/2019    AST 19 01/29/2019    ALT 42 01/29/2019    ALBUMIN 3.5 01/29/2019    PROT 7.2 01/29/2019    BILITOT 0.4 01/29/2019    WBC 7.22 01/29/2019    HGB 12.2 01/29/2019    HCT 39.7 01/29/2019    MCV 90 01/29/2019     (H) 01/29/2019    TSH 1.578 06/15/2017    CHOL 268 (H) 02/19/2020    HDL 48 02/19/2020    LDLCALC 200.8 (H) 02/19/2020    TRIG 96 02/19/2020     Assessment:      1. Essential hypertension    2. Mixed hyperlipidemia    3. Obesity (BMI 30-39.9)    4. Family history of early CAD    5. Chest pain, unspecified type        Plan:   1. HTN  - change to Norvasc due to fatigue with BB  - low salt diet  - weight loss    2. 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 Insulin resistance - no longer based on recent A1c  - cont meds per PCP    4. Obesity  - need weight loss with diet/exercise     5. Chest pain - resolved  - r/o ischemia with stress test - negative  - 2 D ECHO - normal    Thank you for allowing me to participate in this patient's care. Please do not hesitate to contact me with any questions or concerns. Consult note has been forwarded to the referral physician.

## 2020-02-26 ENCOUNTER — TELEPHONE (OUTPATIENT)
Dept: PHARMACY | Facility: CLINIC | Age: 40
End: 2020-02-26

## 2020-03-26 ENCOUNTER — OFFICE VISIT (OUTPATIENT)
Dept: CARDIOLOGY | Facility: CLINIC | Age: 40
End: 2020-03-26
Payer: COMMERCIAL

## 2020-03-26 ENCOUNTER — PATIENT OUTREACH (OUTPATIENT)
Dept: ADMINISTRATIVE | Facility: OTHER | Age: 40
End: 2020-03-26

## 2020-03-26 DIAGNOSIS — I10 ESSENTIAL HYPERTENSION: Primary | ICD-10-CM

## 2020-03-26 DIAGNOSIS — Z82.49 FAMILY HISTORY OF EARLY CAD: ICD-10-CM

## 2020-03-26 DIAGNOSIS — E78.2 MIXED HYPERLIPIDEMIA: ICD-10-CM

## 2020-03-26 DIAGNOSIS — Z78.9 STATIN INTOLERANCE: ICD-10-CM

## 2020-03-26 DIAGNOSIS — E66.9 OBESITY (BMI 30-39.9): ICD-10-CM

## 2020-03-26 PROCEDURE — 99214 OFFICE O/P EST MOD 30 MIN: CPT | Mod: 95,,, | Performed by: INTERNAL MEDICINE

## 2020-03-26 PROCEDURE — 99214 PR OFFICE/OUTPT VISIT, EST, LEVL IV, 30-39 MIN: ICD-10-PCS | Mod: 95,,, | Performed by: INTERNAL MEDICINE

## 2020-03-26 RX ORDER — PRAVASTATIN SODIUM 20 MG/1
20 TABLET ORAL DAILY
Qty: 30 TABLET | Refills: 3 | Status: SHIPPED | OUTPATIENT
Start: 2020-03-26 | End: 2020-11-30

## 2020-03-26 NOTE — Clinical Note
New statin sent to pharmacy, follow up in 3 months but if she has muscle fatigue/pain let me know asap will order labs and meds. Thanks

## 2020-03-26 NOTE — PROGRESS NOTES
Subjective:   Patient ID:  Niesha Abarca is a 39 y.o. female who presents for cardiac consult of Follow-up and Hyperlipidemia    The patient location is: home  The chief complaint leading to consultation is: follow up  Visit type: Virtual visit with synchronous audio and video  Total time spent with patient: 10 min  Each patient to whom he or she provides medical services by telemedicine is:  (1) informed of the relationship between the physician and patient and the respective role of any other health care provider with respect to management of the patient; and (2) notified that he or she may decline to receive medical services by telemedicine and may withdraw from such care at any time.    Notes: see below    HPI  The patient came in today for cardiac consult of Follow-up and Hyperlipidemia    Referring Physician: Kuldip Bentley MD   Reason for initial consult: CVD Risk, HLD    Niesha Abarca is a 39 y.o. female pt with HTN, HLD, Pre-ecclamsia, insulin resistance,  obesity, FH CAD, prior smoker presents for follow up CV evaluation.    19  She had pre-ecclampsia - Oct 2018 and has had high BP since then. She was on Procardia initially and now has been on Labetolol. Has had more fatigue - will change BB to CCB. Maternal grandmother  at age 39, recently found out info as she was adopted. She was on statin in the past and felt muscle pain/fatigue and took months to feel normal. Was taking metformin before for insulin resistance - A1c normal now. Occ L side atypical pain, unsure if anxiety caused. No exertional CP.      19  ECG stress neg for ischemia but HTN up to 230s/60s. ECHO overall normal. , on Zetia will start. Was on simvastatin in the past. No CP.     20  LDL remains 200 recent lipids. Zetia started last visit but could not tolerate, she has felt SOB/fatigue and after 6 weeks had to stop taking it. Breathing has improved, Repatha ordered.   ECG - NSR with sinus  arrythmia     3/226/20  She could not tolerate Simvastatin in the past, could not tolerate Zetia. Per pharmacist - Patient has only tried simvastatin and reports myalgias, but no further details. This information is insufficient per plan for Repatha coverage. Msg sent to provider to advise. Appeal window: 180 days. TTN  Occ headaches, told to check BP, no fevers/chills    Patient feels no sob, no leg swelling, no PND, no palpitation, no dizziness, no syncope, no CNS symptoms.    Patient has fairly good exercise tolerance.    Patient is compliant with medications.    FH - early CAD - maternal GM    ECG - NSR      EKG Conclusions:    1. The EKG portion of this study is negative for ischemia at a moderate workload, and peak heart rate of 164 bpm (94% of predicted).   2. Exercise capacity is average.   3. Blood pressure response to exercise was hypertensive (Presenting BP: 147/82 Peak BP: 234/60).   4. No significant arrhythmias were present.   5. There were no symptoms of chest discomfort or significant dyspnea throughout the protocol.   6. The Duke treadmill score was 7 suggesting a low probability for future cardiovascular events.    This document has been electronically    SIGNED BY: Bayron Rankin MD On: 2019 06:43    CONCLUSIONS     1 - No wall motion abnormalities.     2 - Normal left ventricular systolic function (EF 55-60%).     3 - Normal left ventricular diastolic function.     4 - Normal right ventricular systolic function .     5 - The estimated PA systolic pressure is 23 mmHg.       This document has been electronically    SIGNED BY: Hola Jerry MD On: 2019 15:16    Past Medical History:   Diagnosis Date    Essential hypertension 2019    Hyperlipidemia     IBS (irritable bowel syndrome)        Past Surgical History:   Procedure Laterality Date    APPENDECTOMY       SECTION      WISDOM TOOTH EXTRACTION         Social History     Tobacco Use    Smoking status: Former  Smoker     Packs/day: 0.50     Years: 15.00     Pack years: 7.50     Types: Cigarettes     Last attempt to quit: 2018     Years since quittin.8    Smokeless tobacco: Never Used   Substance Use Topics    Alcohol use: Not Currently    Drug use: No       Family History   Adopted: Yes       Patient's Medications   New Prescriptions    PRAVASTATIN (PRAVACHOL) 20 MG TABLET    Take 1 tablet (20 mg total) by mouth once daily.   Previous Medications    AMLODIPINE (NORVASC) 5 MG TABLET    Take 1 tablet (5 mg total) by mouth once daily.    BUPROPION (WELLBUTRIN XL) 150 MG TB24 TABLET    TAKE 1 TABLET BY MOUTH EVERY DAY   Modified Medications    No medications on file   Discontinued Medications    No medications on file       Review of Systems   Constitutional: Negative.    HENT: Negative.    Eyes: Negative.    Respiratory: Negative.    Cardiovascular: Negative.    Gastrointestinal: Negative.    Genitourinary: Negative.    Musculoskeletal: Negative.    Skin: Negative.    Neurological: Negative.    Endo/Heme/Allergies: Negative.    Psychiatric/Behavioral: Negative.    All 12 systems otherwise negative.      Wt Readings from Last 3 Encounters:   20 114.8 kg (253 lb)   19 112.9 kg (248 lb 14.4 oz)   19 113.9 kg (251 lb)     Temp Readings from Last 3 Encounters:   19 96.9 °F (36.1 °C) (Tympanic)   18 97.8 °F (36.6 °C) (Tympanic)   17 98 °F (36.7 °C)     BP Readings from Last 3 Encounters:   20 128/80   19 128/86   19 130/84     Pulse Readings from Last 3 Encounters:   20 84   19 96   19 74       There were no vitals taken for this visit.    Objective:   Physical Exam   Constitutional: She is oriented to person, place, and time. She appears well-developed and well-nourished. No distress.   HENT:   Head: Normocephalic.   Mouth/Throat: Oropharynx is clear and moist.   Eyes: Conjunctivae are normal. No scleral icterus.   Pulmonary/Chest: Effort normal.  No respiratory distress.   Neurological: She is alert and oriented to person, place, and time.   Skin: No rash noted. She is not diaphoretic. No erythema. No pallor.   Psychiatric: She has a normal mood and affect. Her behavior is normal. Judgment and thought content normal.       Lab Results   Component Value Date     01/29/2019    K 4.2 01/29/2019     01/29/2019    CO2 27 01/29/2019    BUN 12 01/29/2019    CREATININE 0.8 01/29/2019    GLU 92 01/29/2019    HGBA1C 5.2 01/29/2019    AST 19 01/29/2019    ALT 42 01/29/2019    ALBUMIN 3.5 01/29/2019    PROT 7.2 01/29/2019    BILITOT 0.4 01/29/2019    WBC 7.22 01/29/2019    HGB 12.2 01/29/2019    HCT 39.7 01/29/2019    MCV 90 01/29/2019     (H) 01/29/2019    TSH 1.578 06/15/2017    CHOL 268 (H) 02/19/2020    HDL 48 02/19/2020    LDLCALC 200.8 (H) 02/19/2020    TRIG 96 02/19/2020     Assessment:      1. Essential hypertension    2. Mixed hyperlipidemia    3. Obesity (BMI 30-39.9)    4. Family history of early CAD    5. Statin intolerance        Plan:   1. HTN  - cont meds, check BP at home and report  - low salt diet  - weight loss    2. 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 Insulin resistance - no longer based on recent A1c  - cont meds per PCP    4. Obesity  - need weight loss with diet/exercise     5. Chest pain - resolved  - r/o ischemia with stress test - negative  - 2 D ECHO - normal    Thank you for allowing me to participate in this patient's care. Please do not hesitate to contact me with any questions or concerns. Consult note has been forwarded to the referral physician.

## 2020-03-26 NOTE — TELEPHONE ENCOUNTER
PA for Repatha Sureclick 140mg/ml PENS DENIED on 3/24/20 because patient does NOT meet plan criteria. Plan requires that patient try AT LEAST TWO statin drugs. Statin trials must be documented with drug name, dose, dates of therapy and response.    Patient has only tried simvastatin and reports myalgias, but no further details. This information is insufficient per plan for Repatha coverage. Msg sent to provider to advise. Appeal window: 180 days. TTN

## 2020-07-07 ENCOUNTER — LAB VISIT (OUTPATIENT)
Dept: LAB | Facility: HOSPITAL | Age: 40
End: 2020-07-07
Attending: INTERNAL MEDICINE
Payer: COMMERCIAL

## 2020-07-07 DIAGNOSIS — E78.2 MIXED HYPERLIPIDEMIA: ICD-10-CM

## 2020-07-07 LAB
ALBUMIN SERPL BCP-MCNC: 3.5 G/DL (ref 3.5–5.2)
ALP SERPL-CCNC: 83 U/L (ref 55–135)
ALT SERPL W/O P-5'-P-CCNC: 22 U/L (ref 10–44)
ANION GAP SERPL CALC-SCNC: 6 MMOL/L (ref 8–16)
AST SERPL-CCNC: 12 U/L (ref 10–40)
BILIRUB SERPL-MCNC: 0.3 MG/DL (ref 0.1–1)
BUN SERPL-MCNC: 11 MG/DL (ref 6–20)
CALCIUM SERPL-MCNC: 9.2 MG/DL (ref 8.7–10.5)
CHLORIDE SERPL-SCNC: 103 MMOL/L (ref 95–110)
CHOLEST SERPL-MCNC: 264 MG/DL (ref 120–199)
CHOLEST/HDLC SERPL: 5.9 {RATIO} (ref 2–5)
CK SERPL-CCNC: 38 U/L (ref 20–180)
CO2 SERPL-SCNC: 28 MMOL/L (ref 23–29)
CREAT SERPL-MCNC: 0.8 MG/DL (ref 0.5–1.4)
EST. GFR  (AFRICAN AMERICAN): >60 ML/MIN/1.73 M^2
EST. GFR  (NON AFRICAN AMERICAN): >60 ML/MIN/1.73 M^2
GLUCOSE SERPL-MCNC: 90 MG/DL (ref 70–110)
HDLC SERPL-MCNC: 45 MG/DL (ref 40–75)
HDLC SERPL: 17 % (ref 20–50)
LDLC SERPL CALC-MCNC: 193.4 MG/DL (ref 63–159)
NONHDLC SERPL-MCNC: 219 MG/DL
POTASSIUM SERPL-SCNC: 4.4 MMOL/L (ref 3.5–5.1)
PROT SERPL-MCNC: 7.3 G/DL (ref 6–8.4)
SODIUM SERPL-SCNC: 137 MMOL/L (ref 136–145)
TRIGL SERPL-MCNC: 128 MG/DL (ref 30–150)

## 2020-07-07 PROCEDURE — 36415 COLL VENOUS BLD VENIPUNCTURE: CPT | Mod: PO

## 2020-07-07 PROCEDURE — 80061 LIPID PANEL: CPT

## 2020-07-07 PROCEDURE — 82550 ASSAY OF CK (CPK): CPT

## 2020-07-07 PROCEDURE — 80053 COMPREHEN METABOLIC PANEL: CPT

## 2020-07-17 ENCOUNTER — OFFICE VISIT (OUTPATIENT)
Dept: CARDIOLOGY | Facility: CLINIC | Age: 40
End: 2020-07-17
Payer: COMMERCIAL

## 2020-07-17 VITALS
OXYGEN SATURATION: 99 % | DIASTOLIC BLOOD PRESSURE: 84 MMHG | WEIGHT: 260.38 LBS | BODY MASS INDEX: 43.38 KG/M2 | SYSTOLIC BLOOD PRESSURE: 122 MMHG | HEIGHT: 65 IN | HEART RATE: 107 BPM

## 2020-07-17 DIAGNOSIS — Z78.9 STATIN INTOLERANCE: ICD-10-CM

## 2020-07-17 DIAGNOSIS — Z82.49 FAMILY HISTORY OF EARLY CAD: ICD-10-CM

## 2020-07-17 DIAGNOSIS — E78.2 MIXED HYPERLIPIDEMIA: ICD-10-CM

## 2020-07-17 DIAGNOSIS — E66.9 OBESITY (BMI 30-39.9): ICD-10-CM

## 2020-07-17 DIAGNOSIS — K58.8 OTHER IRRITABLE BOWEL SYNDROME: ICD-10-CM

## 2020-07-17 DIAGNOSIS — I10 ESSENTIAL HYPERTENSION: Primary | ICD-10-CM

## 2020-07-17 PROCEDURE — 3079F DIAST BP 80-89 MM HG: CPT | Mod: CPTII,S$GLB,, | Performed by: INTERNAL MEDICINE

## 2020-07-17 PROCEDURE — 3074F PR MOST RECENT SYSTOLIC BLOOD PRESSURE < 130 MM HG: ICD-10-PCS | Mod: CPTII,S$GLB,, | Performed by: INTERNAL MEDICINE

## 2020-07-17 PROCEDURE — 3074F SYST BP LT 130 MM HG: CPT | Mod: CPTII,S$GLB,, | Performed by: INTERNAL MEDICINE

## 2020-07-17 PROCEDURE — 99214 OFFICE O/P EST MOD 30 MIN: CPT | Mod: S$GLB,,, | Performed by: INTERNAL MEDICINE

## 2020-07-17 PROCEDURE — 99214 PR OFFICE/OUTPT VISIT, EST, LEVL IV, 30-39 MIN: ICD-10-PCS | Mod: S$GLB,,, | Performed by: INTERNAL MEDICINE

## 2020-07-17 PROCEDURE — 99999 PR PBB SHADOW E&M-EST. PATIENT-LVL III: CPT | Mod: PBBFAC,,, | Performed by: INTERNAL MEDICINE

## 2020-07-17 PROCEDURE — 99999 PR PBB SHADOW E&M-EST. PATIENT-LVL III: ICD-10-PCS | Mod: PBBFAC,,, | Performed by: INTERNAL MEDICINE

## 2020-07-17 PROCEDURE — 3008F BODY MASS INDEX DOCD: CPT | Mod: CPTII,S$GLB,, | Performed by: INTERNAL MEDICINE

## 2020-07-17 PROCEDURE — 3079F PR MOST RECENT DIASTOLIC BLOOD PRESSURE 80-89 MM HG: ICD-10-PCS | Mod: CPTII,S$GLB,, | Performed by: INTERNAL MEDICINE

## 2020-07-17 PROCEDURE — 3008F PR BODY MASS INDEX (BMI) DOCUMENTED: ICD-10-PCS | Mod: CPTII,S$GLB,, | Performed by: INTERNAL MEDICINE

## 2020-07-17 RX ORDER — EVOLOCUMAB 140 MG/ML
140 INJECTION, SOLUTION SUBCUTANEOUS
Qty: 2 ML | Refills: 0 | Status: SHIPPED | OUTPATIENT
Start: 2020-07-17 | End: 2020-08-14 | Stop reason: SDUPTHER

## 2020-07-17 RX ORDER — AMLODIPINE BESYLATE 5 MG/1
5 TABLET ORAL DAILY
Qty: 90 TABLET | Refills: 1 | Status: SHIPPED | OUTPATIENT
Start: 2020-07-17 | End: 2020-07-28 | Stop reason: SDUPTHER

## 2020-07-17 NOTE — PROGRESS NOTES
Subjective:   Patient ID:  Niehsa Abarca is a 39 y.o. female who presents for cardiac consult of Follow-up    HPI  The patient came in today for cardiac consult of Follow-up    Referring Physician: Kuldip Bentley MD   Reason for initial consult: CVD Risk, HLD    Niesha Abarca is a 39 y.o. female pt with HTN, HLD, Pre-ecclamsia, insulin resistance,  obesity, FH CAD, prior smoker presents for follow up CV evaluation.    19  She had pre-ecclampsia - Oct 2018 and has had high BP since then. She was on Procardia initially and now has been on Labetolol. Has had more fatigue - will change BB to CCB. Maternal grandmother  at age 39, recently found out info as she was adopted. She was on statin in the past and felt muscle pain/fatigue and took months to feel normal. Was taking metformin before for insulin resistance - A1c normal now. Occ L side atypical pain, unsure if anxiety caused. No exertional CP.      19  ECG stress neg for ischemia but HTN up to 230s/60s. ECHO overall normal. , on Zetia will start. Was on simvastatin in the past. No CP.     20  LDL remains 200 recent lipids. Zetia started last visit but could not tolerate, she has felt SOB/fatigue and after 6 weeks had to stop taking it. Breathing has improved, Repatha ordered.   ECG - NSR with sinus arrythmia     3/226/20  She could not tolerate Simvastatin in the past, could not tolerate Zetia. Per pharmacist - Patient has only tried simvastatin and reports myalgias, but no further details. This information is insufficient per plan for Repatha coverage. Msg sent to provider to advise. Appeal window: 180 days. TTN  Occ headaches, told to check BP, no fevers/chills    20  Ordered Repatha but not approved, on pravastatin now. She has tried it for a few months has severe muscle pain and joint pain could not tolerate. Discussed will stop and reorder Repatha.   No CP/SOB.     Patient feels no sob, no leg swelling, no PND,  no palpitation, no dizziness, no syncope, no CNS symptoms.    Patient has fairly good exercise tolerance. Is a teacher, 5th grade.     Patient is compliant with medications.    FH - early CAD - maternal GM    ECG - NSR      EKG Conclusions:    1. The EKG portion of this study is negative for ischemia at a moderate workload, and peak heart rate of 164 bpm (94% of predicted).   2. Exercise capacity is average.   3. Blood pressure response to exercise was hypertensive (Presenting BP: 147/82 Peak BP: 234/60).   4. No significant arrhythmias were present.   5. There were no symptoms of chest discomfort or significant dyspnea throughout the protocol.   6. The Duke treadmill score was 7 suggesting a low probability for future cardiovascular events.    This document has been electronically    SIGNED BY: Bayron Rankin MD On: 2019 06:43    CONCLUSIONS     1 - No wall motion abnormalities.     2 - Normal left ventricular systolic function (EF 55-60%).     3 - Normal left ventricular diastolic function.     4 - Normal right ventricular systolic function .     5 - The estimated PA systolic pressure is 23 mmHg.       This document has been electronically    SIGNED BY: Hola Jerry MD On: 2019 15:16    Past Medical History:   Diagnosis Date    Essential hypertension 2019    Hyperlipidemia     IBS (irritable bowel syndrome)        Past Surgical History:   Procedure Laterality Date    APPENDECTOMY       SECTION      WISDOM TOOTH EXTRACTION         Social History     Tobacco Use    Smoking status: Former Smoker     Packs/day: 0.50     Years: 15.00     Pack years: 7.50     Types: Cigarettes     Quit date: 2018     Years since quittin.2    Smokeless tobacco: Never Used   Substance Use Topics    Alcohol use: Not Currently    Drug use: No       Family History   Adopted: Yes       Patient's Medications   New Prescriptions    EVOLOCUMAB (REPATHA SURECLICK) 140 MG/ML PNJAIME    Inject 1 mL  "(140 mg total) into the skin every 14 (fourteen) days.   Previous Medications    BUPROPION (WELLBUTRIN XL) 150 MG TB24 TABLET    TAKE 1 TABLET BY MOUTH EVERY DAY    PRAVASTATIN (PRAVACHOL) 20 MG TABLET    Take 1 tablet (20 mg total) by mouth once daily.   Modified Medications    Modified Medication Previous Medication    AMLODIPINE (NORVASC) 5 MG TABLET amLODIPine (NORVASC) 5 MG tablet       Take 1 tablet (5 mg total) by mouth once daily.    Take 1 tablet (5 mg total) by mouth once daily.   Discontinued Medications    No medications on file       Review of Systems   Constitutional: Negative.    HENT: Negative.    Eyes: Negative.    Respiratory: Negative.    Cardiovascular: Negative.    Gastrointestinal: Negative.    Genitourinary: Negative.    Musculoskeletal: Negative.    Skin: Negative.    Neurological: Negative.    Endo/Heme/Allergies: Negative.    Psychiatric/Behavioral: Negative.    All 12 systems otherwise negative.      Wt Readings from Last 3 Encounters:   07/17/20 118.1 kg (260 lb 5.8 oz)   02/24/20 114.8 kg (253 lb)   09/04/19 112.9 kg (248 lb 14.4 oz)     Temp Readings from Last 3 Encounters:   02/05/19 96.9 °F (36.1 °C) (Tympanic)   12/18/18 97.8 °F (36.6 °C) (Tympanic)   11/20/17 98 °F (36.7 °C)     BP Readings from Last 3 Encounters:   07/17/20 122/84   02/24/20 128/80   09/04/19 128/86     Pulse Readings from Last 3 Encounters:   07/17/20 107   02/24/20 84   09/04/19 96       /84 (BP Location: Right arm, Patient Position: Sitting, BP Method: Large (Manual))   Pulse 107   Ht 5' 5" (1.651 m)   Wt 118.1 kg (260 lb 5.8 oz)   SpO2 99%   BMI 43.33 kg/m²     Objective:   Physical Exam   Constitutional: She is oriented to person, place, and time. She appears well-developed and well-nourished. No distress.   Obese   HENT:   Head: Normocephalic and atraumatic.   Nose: Nose normal.   Mouth/Throat: Oropharynx is clear and moist.   Eyes: Conjunctivae and EOM are normal. No scleral icterus.   Neck: " Normal range of motion. Neck supple. No JVD present. No thyromegaly present.   Cardiovascular: Normal rate, regular rhythm, S1 normal and S2 normal. Exam reveals no gallop, no S3, no S4 and no friction rub.   No murmur heard.  Pulmonary/Chest: Effort normal and breath sounds normal. No stridor. No respiratory distress. She has no wheezes. She has no rales. She exhibits no tenderness.   Abdominal: Soft. Bowel sounds are normal. She exhibits no distension and no mass. There is no abdominal tenderness. There is no rebound.   Genitourinary:    Genitourinary Comments: Deferred     Musculoskeletal: Normal range of motion.         General: No tenderness, deformity or edema.   Lymphadenopathy:     She has no cervical adenopathy.   Neurological: She is alert and oriented to person, place, and time. She exhibits normal muscle tone. Coordination normal.   Skin: Skin is warm and dry. No rash noted. She is not diaphoretic. No erythema. No pallor.   Psychiatric: She has a normal mood and affect. Her behavior is normal. Judgment and thought content normal.   Nursing note and vitals reviewed.      Lab Results   Component Value Date     07/07/2020    K 4.4 07/07/2020     07/07/2020    CO2 28 07/07/2020    BUN 11 07/07/2020    CREATININE 0.8 07/07/2020    GLU 90 07/07/2020    HGBA1C 5.2 01/29/2019    AST 12 07/07/2020    ALT 22 07/07/2020    ALBUMIN 3.5 07/07/2020    PROT 7.3 07/07/2020    BILITOT 0.3 07/07/2020    WBC 7.22 01/29/2019    HGB 12.2 01/29/2019    HCT 39.7 01/29/2019    MCV 90 01/29/2019     (H) 01/29/2019    TSH 1.578 06/15/2017    CHOL 264 (H) 07/07/2020    HDL 45 07/07/2020    LDLCALC 193.4 (H) 07/07/2020    TRIG 128 07/07/2020     Assessment:      1. Essential hypertension    2. Mixed hyperlipidemia    3. Obesity (BMI 30-39.9)    4. Other irritable bowel syndrome    5. Family history of early CAD    6. Statin intolerance        Plan:   1. HTN  - cont meds, check BP at home and report  - low salt  diet  - weight loss    2. 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 Insulin resistance - no longer based on recent A1c  - cont meds per PCP    4. Obesity  - need weight loss with diet/exercise     5. Chest pain - resolved  - r/o ischemia with stress test - negative  - 2 D ECHO - normal    6. IBS  - cont tx per PCP    Thank you for allowing me to participate in this patient's care. Please do not hesitate to contact me with any questions or concerns. Consult note has been forwarded to the referral physician.

## 2020-07-20 ENCOUNTER — TELEPHONE (OUTPATIENT)
Dept: PHARMACY | Facility: CLINIC | Age: 40
End: 2020-07-20

## 2020-07-22 NOTE — TELEPHONE ENCOUNTER
Initial Repatha consult completed on 20 . Repatha will be shipped on  to arrive at patient's home on  via [employer]. $0 copay. Patient will start Repatha on . Address confirmed. Confirmed 2 patient identifiers - name and . Therapy Appropriate.  --Injection experience: None;  does use an autoinjector for psoriasis    Counseled patient on administration directions:  - Inject 140 mg/ml into the skin every 14 days.   - Take out of the refrigerator 30-60 minutes prior to injection.  - Wash hands before and after injection.  - Monthly RX will come with gauze, bandaids, and alcohol swabs.  - Patient may inject in either the tops of the thighs, abdomen (at least 2 inches away from belly button), or the outer part of upper arm. Patient was instructed to rotate injections sites.  - Patient is to wipe down the injection site with the alcohol pad, wait to dry. Gently squeeze the area of the cleaned skin and hold it firmly. Place the pen flat against the raised area of skin that is being squeezed, then push down on the button and release, in 10-15 seconds you will hear a click and the window will go from from clear to yellow, indicating injection is complete.  - Patient will use sharps container to dispose of used medication device. Once full, per [place] law, patient may lock the sharps container and place it in the trash. Patient can then contact the pharmacy and [facility] will replace the sharps at no additional charge.    Patient was counseled on possible side effects:  - Injection site reaction: redness, soreness, itching, bruising, which should resolve within 3-5 days.  - Flu-like symptoms    Storage: Advised to keep refrigerated for stability until expiration on the box, but room temperature is ok if used within 30 days. Pt advised to keep in the fridge in an area that will not freeze or get too warm.    Diet and exercise recommendations reviewed - heart healthy diet low in fats/fried foods and at least 30  minutes of exercise 5 days a week.    Patient did not have any further questions. Patient was advised to keep a calendar to stay compliant. Consultation included: indication; goals of treatment; administration; storage and handling; side effects; how to handle side effects; the importance of compliance; how to handle missed doses; the importance of laboratory monitoring; the importance of keeping all follow up appointments.  Patient understands to report any medication changes to [facility] and provider. All questions answered and addressed to patients satisfaction.[facility] to contact patient in 3 weeks for refills.

## 2020-07-22 NOTE — TELEPHONE ENCOUNTER
DOCUMENTATION ONLY:  Prior authorization for Repatha approved from 7/21/2020 to 1/20/2021.  Case ID# [id number]    Co-pay: $5.00    Patient Assistance IS NOT required.     Forward to clinical pharmacist for consult & shipment.     Repatha co-pay coupon card information:   RxBIN: [id number]  RxPCN: [id number]  RxGRP: [id number]  ID: [id number]

## 2020-07-28 RX ORDER — AMLODIPINE BESYLATE 5 MG/1
5 TABLET ORAL DAILY
Qty: 90 TABLET | Refills: 1 | Status: SHIPPED | OUTPATIENT
Start: 2020-07-28 | End: 2020-12-29 | Stop reason: SDUPTHER

## 2020-08-14 DIAGNOSIS — E78.2 MIXED HYPERLIPIDEMIA: ICD-10-CM

## 2020-08-14 DIAGNOSIS — I10 ESSENTIAL HYPERTENSION: Primary | ICD-10-CM

## 2020-08-17 ENCOUNTER — TELEPHONE (OUTPATIENT)
Dept: PHARMACY | Facility: CLINIC | Age: 40
End: 2020-08-17

## 2020-08-17 RX ORDER — EVOLOCUMAB 140 MG/ML
140 INJECTION, SOLUTION SUBCUTANEOUS
Qty: 2 ML | Refills: 0 | Status: SHIPPED | OUTPATIENT
Start: 2020-08-17 | End: 2020-09-11 | Stop reason: SDUPTHER

## 2020-09-11 DIAGNOSIS — E78.2 MIXED HYPERLIPIDEMIA: ICD-10-CM

## 2020-09-11 DIAGNOSIS — I10 ESSENTIAL HYPERTENSION: ICD-10-CM

## 2020-09-11 RX ORDER — EVOLOCUMAB 140 MG/ML
140 INJECTION, SOLUTION SUBCUTANEOUS
Qty: 2 ML | Refills: 0 | Status: SHIPPED | OUTPATIENT
Start: 2020-09-11 | End: 2020-10-31 | Stop reason: SDUPTHER

## 2020-09-14 ENCOUNTER — TELEPHONE (OUTPATIENT)
Dept: PHARMACY | Facility: CLINIC | Age: 40
End: 2020-09-14

## 2020-09-25 ENCOUNTER — TELEPHONE (OUTPATIENT)
Dept: PHARMACY | Facility: CLINIC | Age: 40
End: 2020-09-25

## 2020-09-25 ENCOUNTER — PATIENT MESSAGE (OUTPATIENT)
Dept: DERMATOLOGY | Facility: CLINIC | Age: 40
End: 2020-09-25

## 2020-09-30 ENCOUNTER — PATIENT OUTREACH (OUTPATIENT)
Dept: ADMINISTRATIVE | Facility: OTHER | Age: 40
End: 2020-09-30

## 2020-10-01 ENCOUNTER — OFFICE VISIT (OUTPATIENT)
Dept: DERMATOLOGY | Facility: CLINIC | Age: 40
End: 2020-10-01
Payer: COMMERCIAL

## 2020-10-01 DIAGNOSIS — L81.4 LENTIGO: ICD-10-CM

## 2020-10-01 DIAGNOSIS — D22.9 BENIGN NEVUS: ICD-10-CM

## 2020-10-01 DIAGNOSIS — L72.0 MILIA: ICD-10-CM

## 2020-10-01 DIAGNOSIS — D48.5 NEOPLASM OF UNCERTAIN BEHAVIOR OF SKIN: Primary | ICD-10-CM

## 2020-10-01 DIAGNOSIS — L91.8 ACROCHORDON: ICD-10-CM

## 2020-10-01 PROCEDURE — 99999 PR PBB SHADOW E&M-EST. PATIENT-LVL III: CPT | Mod: PBBFAC,,, | Performed by: STUDENT IN AN ORGANIZED HEALTH CARE EDUCATION/TRAINING PROGRAM

## 2020-10-01 PROCEDURE — 88305 TISSUE EXAM BY PATHOLOGIST: CPT | Mod: 26,,, | Performed by: DERMATOLOGY

## 2020-10-01 PROCEDURE — 11102 PR TANGENTIAL BIOPSY, SKIN, SINGLE LESION: ICD-10-PCS | Mod: S$GLB,,, | Performed by: STUDENT IN AN ORGANIZED HEALTH CARE EDUCATION/TRAINING PROGRAM

## 2020-10-01 PROCEDURE — 11102 TANGNTL BX SKIN SINGLE LES: CPT | Mod: S$GLB,,, | Performed by: STUDENT IN AN ORGANIZED HEALTH CARE EDUCATION/TRAINING PROGRAM

## 2020-10-01 PROCEDURE — 99213 PR OFFICE/OUTPT VISIT, EST, LEVL III, 20-29 MIN: ICD-10-PCS | Mod: 25,S$GLB,, | Performed by: STUDENT IN AN ORGANIZED HEALTH CARE EDUCATION/TRAINING PROGRAM

## 2020-10-01 PROCEDURE — 99213 OFFICE O/P EST LOW 20 MIN: CPT | Mod: 25,S$GLB,, | Performed by: STUDENT IN AN ORGANIZED HEALTH CARE EDUCATION/TRAINING PROGRAM

## 2020-10-01 PROCEDURE — 99999 PR PBB SHADOW E&M-EST. PATIENT-LVL III: ICD-10-PCS | Mod: PBBFAC,,, | Performed by: STUDENT IN AN ORGANIZED HEALTH CARE EDUCATION/TRAINING PROGRAM

## 2020-10-01 PROCEDURE — 88305 TISSUE EXAM BY PATHOLOGIST: CPT | Performed by: DERMATOLOGY

## 2020-10-01 PROCEDURE — 88305 TISSUE EXAM BY PATHOLOGIST: ICD-10-PCS | Mod: 26,,, | Performed by: DERMATOLOGY

## 2020-10-01 NOTE — PROGRESS NOTES
Subjective:       Patient ID:  Niesha Abarca is a 40 y.o. female who presents for   Chief Complaint   Patient presents with    Mole     neck      HPI     History of Present Illness: The patient presents with chief complaint of mole   Location: left neck, right nasal ala   Duration: unsure  Signs/Symptoms: bleeding and slightly painful when bleeding   Prior treatments: none    Denies personal hx of melanoma or NMSC. Unknown family hx of skin cancers.       Review of Systems   Skin: Positive for daily sunscreen use. Negative for itching, rash and recent sunburn.   Hematologic/Lymphatic: Does not bruise/bleed easily.        Objective:    Physical Exam   Constitutional: She appears well-developed and well-nourished. No distress.   Neurological: She is alert and oriented to person, place, and time. She is not disoriented.   Psychiatric: She has a normal mood and affect.   Skin:   Areas Examined (abnormalities noted in diagram):   Head / Face Inspection Performed  Neck Inspection Performed  Chest / Axilla Inspection Performed  Back Inspection Performed  RUE Inspected  LUE Inspection Performed                   Diagram Legend     Erythematous scaling macule/papule c/w actinic keratosis       Vascular papule c/w angioma      Pigmented verrucoid papule/plaque c/w seborrheic keratosis      Yellow umbilicated papule c/w sebaceous hyperplasia      Irregularly shaped tan macule c/w lentigo     1-2 mm smooth white papules consistent with Milia      Movable subcutaneous cyst with punctum c/w epidermal inclusion cyst      Subcutaneous movable cyst c/w pilar cyst      Firm pink to brown papule c/w dermatofibroma      Pedunculated fleshy papule(s) c/w skin tag(s)      Evenly pigmented macule c/w junctional nevus     Mildly variegated pigmented, slightly irregular-bordered macule c/w mildly atypical nevus      Flesh colored to evenly pigmented papule c/w intradermal nevus       Pink pearly papule/plaque c/w basal cell  carcinoma      Erythematous hyperkeratotic cursted plaque c/w SCC      Surgical scar with no sign of skin cancer recurrence      Open and closed comedones      Inflammatory papules and pustules      Verrucoid papule consistent consistent with wart     Erythematous eczematous patches and plaques     Dystrophic onycholytic nail with subungual debris c/w onychomycosis     Umbilicated papule    Erythematous-base heme-crusted tan verrucoid plaque consistent with inflamed seborrheic keratosis     Erythematous Silvery Scaling Plaque c/w Psoriasis     See annotation      Assessment / Plan:      Pathology Orders:     Normal Orders This Visit    Specimen to Pathology, Dermatology     Comments:    Number of Specimens:->1  ------------------------->-------------------------  Spec 1 Procedure:->Biopsy  Spec 1 Clinical Impression:->rule out amelanotic melanoma  Spec 1 Source:->left neck    Questions:    Procedure Type: Dermatology and skin neoplasms    Number of Specimens: 1    ------------------------: -------------------------    Spec 1 Procedure: Biopsy    Spec 1 Clinical Impression: rule out amelanotic melanoma    Spec 1 Source: left neck    Clinical Information: 0.5 cm pink papule that has been bleeding and growing in size        Neoplasm of uncertain behavior of skin  -     Specimen to Pathology, Dermatology  Shave biopsy procedure note:    Shave biopsy performed after verbal consent including risk of infection, scar, recurrence, need for additional treatment of site. Area prepped with alcohol, anesthetized with approximately 1.0cc of 1% lidocaine with epinephrine. Lesional tissue shaved with razor blade. Hemostasis achieved with application of aluminum chloride followed by hyfrecation. No complications. Dressing applied. Wound care explained.      Milia   - stable and chronic    Lentigo  This is a benign hyperpigmented sun induced lesion. Daily sun protection will reduce the number of new lesions. Treatment of these benign  lesions are considered cosmetic.    Benign nevus  Discussed ABCDE's of nevi.  Monitor for new mole or moles that are becoming bigger, darker, irritated, or developing irregular borders. Brochure provided.    Acrochordon  - stable and chronic             Follow up in about 1 year (around 10/1/2021).

## 2020-10-04 ENCOUNTER — OFFICE VISIT (OUTPATIENT)
Dept: URGENT CARE | Facility: CLINIC | Age: 40
End: 2020-10-04
Payer: COMMERCIAL

## 2020-10-04 VITALS
HEART RATE: 65 BPM | BODY MASS INDEX: 40.77 KG/M2 | SYSTOLIC BLOOD PRESSURE: 136 MMHG | TEMPERATURE: 98 F | DIASTOLIC BLOOD PRESSURE: 76 MMHG | WEIGHT: 245 LBS | OXYGEN SATURATION: 96 %

## 2020-10-04 DIAGNOSIS — J06.9 UPPER RESPIRATORY TRACT INFECTION, UNSPECIFIED TYPE: Primary | ICD-10-CM

## 2020-10-04 DIAGNOSIS — Z20.822 EXPOSURE TO COVID-19 VIRUS: ICD-10-CM

## 2020-10-04 LAB
CTP QC/QA: YES
SARS-COV-2 RDRP RESP QL NAA+PROBE: NEGATIVE

## 2020-10-04 PROCEDURE — 99214 PR OFFICE/OUTPT VISIT, EST, LEVL IV, 30-39 MIN: ICD-10-PCS | Mod: S$GLB,CS,, | Performed by: PHYSICIAN ASSISTANT

## 2020-10-04 PROCEDURE — U0002: ICD-10-PCS | Mod: QW,S$GLB,, | Performed by: PHYSICIAN ASSISTANT

## 2020-10-04 PROCEDURE — 99214 OFFICE O/P EST MOD 30 MIN: CPT | Mod: S$GLB,CS,, | Performed by: PHYSICIAN ASSISTANT

## 2020-10-04 PROCEDURE — U0002 COVID-19 LAB TEST NON-CDC: HCPCS | Mod: QW,S$GLB,, | Performed by: PHYSICIAN ASSISTANT

## 2020-10-04 NOTE — PROGRESS NOTES
Subjective:       Patient ID: Niesha Abarca is a 40 y.o. female.    Vitals:  weight is 111.1 kg (245 lb). Her temporal temperature is 98.2 °F (36.8 °C). Her blood pressure is 136/76 and her pulse is 65. Her oxygen saturation is 96%.     Chief Complaint: No chief complaint on file.    Patient presents with cold symptoms that started yesterday.  Patient just wanted to be tested for COVID since her  had a positive care worker.  Denies any fever.    URI   This is a new problem. The current episode started yesterday. The problem has been gradually worsening. There has been no fever. Associated symptoms include congestion, headaches, sneezing and a sore throat. Pertinent negatives include no abdominal pain, chest pain, coughing, diarrhea, dysuria, ear pain, joint pain, joint swelling, nausea, neck pain, plugged ear sensation, rash, rhinorrhea, sinus pain, swollen glands, vomiting or wheezing. She has tried decongestant for the symptoms. The treatment provided no relief.       HENT: Positive for congestion and sore throat. Negative for ear pain and sinus pain.    Neck: Negative for neck pain.   Cardiovascular: Negative for chest pain.   Respiratory: Negative for cough and wheezing.    Gastrointestinal: Negative for abdominal pain, nausea, vomiting and diarrhea.   Genitourinary: Negative for dysuria.   Skin: Negative for rash.   Allergic/Immunologic: Positive for sneezing.   Neurological: Positive for headaches.       Objective:      Physical Exam   Constitutional: She is oriented to person, place, and time. She appears well-developed. She is cooperative.  Non-toxic appearance. She does not appear ill. No distress.   HENT:   Head: Normocephalic and atraumatic.   Ears:   Right Ear: Hearing, tympanic membrane, external ear and ear canal normal.   Left Ear: Hearing, tympanic membrane, external ear and ear canal normal.   Nose: Nose normal. No mucosal edema, rhinorrhea or nasal deformity. No epistaxis. Right  sinus exhibits no maxillary sinus tenderness and no frontal sinus tenderness. Left sinus exhibits no maxillary sinus tenderness and no frontal sinus tenderness.   Mouth/Throat: Uvula is midline, oropharynx is clear and moist and mucous membranes are normal. No trismus in the jaw. Normal dentition. No uvula swelling. No oropharyngeal exudate, posterior oropharyngeal edema or posterior oropharyngeal erythema.   Eyes: Conjunctivae and lids are normal. No scleral icterus.   Neck: Trachea normal, full passive range of motion without pain and phonation normal. Neck supple. No neck rigidity. No edema and no erythema present.   Cardiovascular: Normal rate, regular rhythm, normal heart sounds and normal pulses.   Pulmonary/Chest: Effort normal and breath sounds normal. No respiratory distress. She has no decreased breath sounds. She has no rhonchi.   Abdominal: Normal appearance.   Musculoskeletal: Normal range of motion.         General: No deformity.   Neurological: She is alert and oriented to person, place, and time. She exhibits normal muscle tone. Coordination normal.   Skin: Skin is warm, dry, intact, not diaphoretic and not pale. Psychiatric: Her speech is normal and behavior is normal. Judgment and thought content normal.   Nursing note and vitals reviewed.    Results for orders placed or performed in visit on 10/04/20   POCT COVID-19 Rapid Screening   Result Value Ref Range    POC Rapid COVID Negative Negative     Acceptable Yes            Assessment:       1. Upper respiratory tract infection, unspecified type    2. Exposure to COVID-19 virus        Plan:       COVID testing negative.  Recommend OTC medications as needed for cold symptoms.  Tylenol as needed for fever.  Rest and drink plenty of fluids.  Patient can return to clinic or follow up with PCP if symptoms worsen or fail to improve. Pt voiced understanding and all questions were answered prior to discharge.     Upper respiratory tract  infection, unspecified type    Exposure to COVID-19 virus  -     POCT COVID-19 Rapid Screening      Patient Instructions   General Instructions for URI:    Symptomatic treatment:     Alternate Tylenol and Ibuprofen every 3 hrs for fever, pain and inflammation. Avoid Nsaids if you are pregnant or have advanced kidney disease.      Salt water gargles/Chloroseptic spray to soothe throat from post nasal drip  Honey/lemon water or warm tea to soothe throat from post nasal drip  Cepachol helps to soothe the discomfort in throat from post nasal drip     Cold-eeze helps to reduce the duration of URI symptoms if taken early  Elderberry to reduce duration of viral URI symptoms     Nasal saline spray reduces inflammation and dryness  Warm face compresses/hot showers as often as you can to open sinuses and allow to drain.   Flonase OTC or Nasacort OTC to help decrease inflammation in nasal turbinates and allow sinuses to drain     Vicks vapor rub at night  Simple foods like chicken noodle soup help provide hydration and nutrition     Delsym helps with coughing at night     Zantac will help if there is reflux from the post nasal drip and helpful to take at night     Zyrtec/Claritin during the day and Benadryl at night may help if allergy component concurrently with URI     If you DO NOT have Hypertension or any history of palpitations, it is ok to take over the counter Sudafed or Mucinex D  or Allegra-D or Claritin-D or Zyrtec-D.  If you do take one of the above, it is ok to combine that with plain over the counter Mucinex or Allegra or Claritin or  Zyrtec. If, for example, you are taking Zyrtec -D, you can combine that with Mucinex, but not Mucinex-D. If you are  taking Mucinex-D, you can combine that with plain Allegra or Claritin or Zyrtec.  If you DO have Hypertension or palpitations, it is safe to take Coricidin HBP for relief of sinus symptoms.  Please follow up with your primary care provider within 2-5 days if your signs  and symptoms have not resolved or  worsen.  If your condition worsens or fails to improve we recommend that you receive another evaluation at the emergency  room immediately or contact your primary medical clinic to discuss your concerns.  You must understand that you have received an Urgent Care treatment only and that you may be released before all of  your medical problems are known or treated. You, the patient, will arrange for follow up care as instructed.    Rest as much as you can     Your symptoms will likely last 5-7 days, maybe longer depending on how it affects your body.  You are contagious 5-7, so minimize contact with others to reduce the spread to others and stay home from work or school as we discussed. Dehydration is preventable but is one of the main reasons why you will feel so badly. Drink pedialyte, gatorade or propel. Stay hydrated.  Antibiotics are not needed unless a complication(such as Otitis Media, Bacterial sinus infection or pneumonia) develops. Taking antibiotics for Flu/Cold is not supported by evidence-based medicine and can expose you to unnecessary side effects of the medication, such as anaphylaxis, yeast infection and leads to antibiotic resistance.   If you experience any:  Chest pain, shortness of breath, wheezing or difficulty breathing,  Severe headache, face, neck or ear pain,  New rash,  Fever over 101.5º F (38.6 C) for more than three days,  Confusion, behavior change or seizure,  Severe weakness or dizziness, please go to the ER immediately for further testing.

## 2020-10-04 NOTE — LETTER
[id number] [facility] [address] ? [place], [address] ? Phone [phone] ? Fax             Return to Work/School    Patient: Niesha Abarca  YOB: 1980   Date: 10/04/2020      To Whom It May Concern:     Niesha Abarca was in contact with/seen in my office on 10/04/2020. COVID-19 is present in our communities across the state. Not all patients are eligible or appropriate to be tested. In this situation, your employee meets the following criteria:     Niesha Abarca has met the criteria for COVID-19 testing and has a NEGATIVE result. The employee can return to work once they are asymptomatic for 24 hours without the use of fever reducing medications (Tylenol, Motrin, etc).     If you have any questions or concerns, or if I can be of further assistance, please do not hesitate to contact me.     Sincerely,    Francoise Grossman PA-C

## 2020-10-06 LAB
FINAL PATHOLOGIC DIAGNOSIS: NORMAL
GROSS: NORMAL
MICROSCOPIC EXAM: NORMAL

## 2020-10-31 DIAGNOSIS — E78.2 MIXED HYPERLIPIDEMIA: ICD-10-CM

## 2020-10-31 DIAGNOSIS — I10 ESSENTIAL HYPERTENSION: ICD-10-CM

## 2020-11-02 RX ORDER — EVOLOCUMAB 140 MG/ML
140 INJECTION, SOLUTION SUBCUTANEOUS
Qty: 2 ML | Refills: 0 | Status: SHIPPED | OUTPATIENT
Start: 2020-11-02 | End: 2020-11-30 | Stop reason: SDUPTHER

## 2020-11-03 ENCOUNTER — TELEPHONE (OUTPATIENT)
Dept: PHARMACY | Facility: CLINIC | Age: 40
End: 2020-11-03

## 2020-11-03 NOTE — TELEPHONE ENCOUNTER
Emergency fill plan:  Shipping Repatha on 11/5 to arrive 11/6, patient next injection is due 'sometime next week' she states.  Patient reports injecting one week late and stated she picked back up, she also inquired about whether or not increased blood sugar was a side effect of the medication.  Transferred to [facility] for consult.  Patient reports no new medications and no new side effects.  No questions or concerns for the Pharmacist at this time.

## 2020-11-05 ENCOUNTER — PATIENT OUTREACH (OUTPATIENT)
Dept: ADMINISTRATIVE | Facility: HOSPITAL | Age: 40
End: 2020-11-05

## 2020-11-05 NOTE — PROGRESS NOTES
Working [employer] HTN report.     Requested updated bp reading. States has not checked it but see if she could and send it through pt portal.

## 2020-11-10 ENCOUNTER — PATIENT MESSAGE (OUTPATIENT)
Dept: CARDIOLOGY | Facility: CLINIC | Age: 40
End: 2020-11-10

## 2020-11-10 DIAGNOSIS — E78.2 MIXED HYPERLIPIDEMIA: Primary | ICD-10-CM

## 2020-11-17 ENCOUNTER — LAB VISIT (OUTPATIENT)
Dept: LAB | Facility: HOSPITAL | Age: 40
End: 2020-11-17
Attending: INTERNAL MEDICINE
Payer: COMMERCIAL

## 2020-11-17 DIAGNOSIS — E78.2 MIXED HYPERLIPIDEMIA: ICD-10-CM

## 2020-11-17 PROCEDURE — 80061 LIPID PANEL: CPT

## 2020-11-17 PROCEDURE — 36415 COLL VENOUS BLD VENIPUNCTURE: CPT | Mod: PO

## 2020-11-18 LAB
CHOLEST SERPL-MCNC: 185 MG/DL (ref 120–199)
CHOLEST/HDLC SERPL: 3.7 {RATIO} (ref 2–5)
HDLC SERPL-MCNC: 50 MG/DL (ref 40–75)
HDLC SERPL: 27 % (ref 20–50)
LDLC SERPL CALC-MCNC: 117 MG/DL (ref 63–159)
NONHDLC SERPL-MCNC: 135 MG/DL
TRIGL SERPL-MCNC: 90 MG/DL (ref 30–150)

## 2020-11-29 ENCOUNTER — PATIENT OUTREACH (OUTPATIENT)
Dept: ADMINISTRATIVE | Facility: OTHER | Age: 40
End: 2020-11-29

## 2020-11-30 ENCOUNTER — OFFICE VISIT (OUTPATIENT)
Dept: CARDIOLOGY | Facility: CLINIC | Age: 40
End: 2020-11-30
Payer: COMMERCIAL

## 2020-11-30 VITALS
RESPIRATION RATE: 16 BRPM | WEIGHT: 245 LBS | SYSTOLIC BLOOD PRESSURE: 142 MMHG | DIASTOLIC BLOOD PRESSURE: 92 MMHG | OXYGEN SATURATION: 99 % | HEART RATE: 86 BPM | BODY MASS INDEX: 40.82 KG/M2 | HEIGHT: 65 IN

## 2020-11-30 DIAGNOSIS — I10 ESSENTIAL HYPERTENSION: Primary | ICD-10-CM

## 2020-11-30 DIAGNOSIS — Z78.9 STATIN INTOLERANCE: ICD-10-CM

## 2020-11-30 DIAGNOSIS — K58.8 OTHER IRRITABLE BOWEL SYNDROME: ICD-10-CM

## 2020-11-30 DIAGNOSIS — E66.9 OBESITY (BMI 30-39.9): ICD-10-CM

## 2020-11-30 DIAGNOSIS — E78.2 MIXED HYPERLIPIDEMIA: ICD-10-CM

## 2020-11-30 PROCEDURE — 3077F SYST BP >= 140 MM HG: CPT | Mod: CPTII,S$GLB,, | Performed by: INTERNAL MEDICINE

## 2020-11-30 PROCEDURE — 3077F PR MOST RECENT SYSTOLIC BLOOD PRESSURE >= 140 MM HG: ICD-10-PCS | Mod: CPTII,S$GLB,, | Performed by: INTERNAL MEDICINE

## 2020-11-30 PROCEDURE — 3008F PR BODY MASS INDEX (BMI) DOCUMENTED: ICD-10-PCS | Mod: CPTII,S$GLB,, | Performed by: INTERNAL MEDICINE

## 2020-11-30 PROCEDURE — 99214 OFFICE O/P EST MOD 30 MIN: CPT | Mod: S$GLB,,, | Performed by: INTERNAL MEDICINE

## 2020-11-30 PROCEDURE — 99999 PR PBB SHADOW E&M-EST. PATIENT-LVL III: ICD-10-PCS | Mod: PBBFAC,,, | Performed by: INTERNAL MEDICINE

## 2020-11-30 PROCEDURE — 99999 PR PBB SHADOW E&M-EST. PATIENT-LVL III: CPT | Mod: PBBFAC,,, | Performed by: INTERNAL MEDICINE

## 2020-11-30 PROCEDURE — 3080F DIAST BP >= 90 MM HG: CPT | Mod: CPTII,S$GLB,, | Performed by: INTERNAL MEDICINE

## 2020-11-30 PROCEDURE — 99214 PR OFFICE/OUTPT VISIT, EST, LEVL IV, 30-39 MIN: ICD-10-PCS | Mod: S$GLB,,, | Performed by: INTERNAL MEDICINE

## 2020-11-30 PROCEDURE — 3008F BODY MASS INDEX DOCD: CPT | Mod: CPTII,S$GLB,, | Performed by: INTERNAL MEDICINE

## 2020-11-30 PROCEDURE — 3080F PR MOST RECENT DIASTOLIC BLOOD PRESSURE >= 90 MM HG: ICD-10-PCS | Mod: CPTII,S$GLB,, | Performed by: INTERNAL MEDICINE

## 2020-11-30 RX ORDER — ACYCLOVIR 400 MG/1
TABLET ORAL
COMMUNITY
Start: 2020-08-27 | End: 2023-02-06 | Stop reason: ALTCHOICE

## 2020-11-30 RX ORDER — EVOLOCUMAB 140 MG/ML
140 INJECTION, SOLUTION SUBCUTANEOUS
Qty: 2 ML | Refills: 2 | Status: SHIPPED | OUTPATIENT
Start: 2020-11-30 | End: 2021-12-14 | Stop reason: SDUPTHER

## 2020-11-30 RX ORDER — IBUPROFEN 600 MG/1
TABLET ORAL
COMMUNITY
Start: 2020-11-24

## 2020-11-30 RX ORDER — EVOLOCUMAB 140 MG/ML
140 INJECTION, SOLUTION SUBCUTANEOUS
Qty: 2 ML | Refills: 2 | Status: SHIPPED | OUTPATIENT
Start: 2020-11-30 | End: 2020-11-30 | Stop reason: SDUPTHER

## 2020-11-30 NOTE — PROGRESS NOTES
Subjective:   Patient ID:  Niesha Abarca is a 40 y.o. female who presents for cardiac consult of Follow-up    HPI  The patient came in today for cardiac consult of Follow-up    Referring Physician: Kuldip Bentley MD   Reason for initial consult: CVD Risk, HLD    Niesha Abarca is a 40 y.o. female pt with HTN, HLD, Pre-ecclamsia, insulin resistance,  obesity, FH CAD, prior smoker presents for follow up CV evaluation.    19  She had pre-ecclampsia - Oct 2018 and has had high BP since then. She was on Procardia initially and now has been on Labetolol. Has had more fatigue - will change BB to CCB. Maternal grandmother  at age 39, recently found out info as she was adopted. She was on statin in the past and felt muscle pain/fatigue and took months to feel normal. Was taking metformin before for insulin resistance - A1c normal now. Occ L side atypical pain, unsure if anxiety caused. No exertional CP.      19  ECG stress neg for ischemia but HTN up to 230s/60s. ECHO overall normal. , on Zetia will start. Was on simvastatin in the past. No CP.     20  LDL remains 200 recent lipids. Zetia started last visit but could not tolerate, she has felt SOB/fatigue and after 6 weeks had to stop taking it. Breathing has improved, Repatha ordered.   ECG - NSR with sinus arrythmia     3/226/20  She could not tolerate Simvastatin in the past, could not tolerate Zetia. Per pharmacist - Patient has only tried simvastatin and reports myalgias, but no further details. This information is insufficient per plan for Repatha coverage. Msg sent to provider to advise. Appeal window: 180 days. TTN  Occ headaches, told to check BP, no fevers/chills    20  Ordered Repatha but not approved, on pravastatin now. She has tried it for a few months has severe muscle pain and joint pain could not tolerate. Discussed will stop and reorder Repatha.   No CP/SOB.     20  Has been on Repatha, recent LDL  improved to 117 from 193. CK and CMP normal. She had cold symptoms after first shot, neg COVID test and then was ok. She has more back ache than normal. She has back pain at times. She just arrived from work, BP elevated. Had Thanksgiving recently with extra salt. Occ chest pain at times, more anxiety at times.     Patient feels no sob, no leg swelling, no PND, no palpitation, no dizziness, no syncope, no CNS symptoms.    Patient has fairly good exercise tolerance. Is a teacher, 5th grade.     Patient is compliant with medications.    FH - early CAD - maternal GM    ECG - NSR      EKG Conclusions:    1. The EKG portion of this study is negative for ischemia at a moderate workload, and peak heart rate of 164 bpm (94% of predicted).   2. Exercise capacity is average.   3. Blood pressure response to exercise was hypertensive (Presenting BP: 147/82 Peak BP: 234/60).   4. No significant arrhythmias were present.   5. There were no symptoms of chest discomfort or significant dyspnea throughout the protocol.   6. The Duke treadmill score was 7 suggesting a low probability for future cardiovascular events.    This document has been electronically    SIGNED BY: Bayron Rankin MD On: 2019 06:43    CONCLUSIONS     1 - No wall motion abnormalities.     2 - Normal left ventricular systolic function (EF 55-60%).     3 - Normal left ventricular diastolic function.     4 - Normal right ventricular systolic function .     5 - The estimated PA systolic pressure is 23 mmHg.       This document has been electronically    SIGNED BY: Hola Jerry MD On: 2019 15:16    Past Medical History:   Diagnosis Date    Essential hypertension 2019    Hyperlipidemia     IBS (irritable bowel syndrome)        Past Surgical History:   Procedure Laterality Date    APPENDECTOMY       SECTION      WISDOM TOOTH EXTRACTION         Social History     Tobacco Use    Smoking status: Former Smoker     Packs/day: 0.50      Years: 15.00     Pack years: 7.50     Types: Cigarettes     Quit date: 2018     Years since quittin.5    Smokeless tobacco: Never Used   Substance Use Topics    Alcohol use: Not Currently    Drug use: No       Family History   Adopted: Yes       Patient's Medications   New Prescriptions    No medications on file   Previous Medications    ACYCLOVIR (ZOVIRAX) 400 MG TABLET    TK 1 T PO TID FOR 5 DAYS    AMLODIPINE (NORVASC) 5 MG TABLET    Take 1 tablet (5 mg total) by mouth once daily.    BUPROPION (WELLBUTRIN XL) 150 MG TB24 TABLET    Take 1 tablet (150 mg total) by mouth once daily.    EVOLOCUMAB (REPATHA SURECLICK) 140 MG/ML PNIJ    Inject 1 mL (140 mg total) into the skin every 14 (fourteen) days.    IBUPROFEN (ADVIL,MOTRIN) 600 MG TABLET        PRAVASTATIN (PRAVACHOL) 20 MG TABLET    Take 1 tablet (20 mg total) by mouth once daily.   Modified Medications    No medications on file   Discontinued Medications    No medications on file       Review of Systems   Constitutional: Negative.    HENT: Negative.    Eyes: Negative.    Respiratory: Negative.    Cardiovascular: Negative.    Gastrointestinal: Negative.    Genitourinary: Negative.    Musculoskeletal: Negative.    Skin: Negative.    Neurological: Negative.    Endo/Heme/Allergies: Negative.    Psychiatric/Behavioral: Negative.    All 12 systems otherwise negative.      Wt Readings from Last 3 Encounters:   20 111.1 kg (245 lb)   10/04/20 111.1 kg (245 lb)   20 118.1 kg (260 lb 5.8 oz)     Temp Readings from Last 3 Encounters:   10/04/20 98.2 °F (36.8 °C) (Temporal)   19 96.9 °F (36.1 °C) (Tympanic)   18 97.8 °F (36.6 °C) (Tympanic)     BP Readings from Last 3 Encounters:   20 (!) 142/92   10/04/20 136/76   20 122/84     Pulse Readings from Last 3 Encounters:   20 86   10/04/20 65   20 107       BP (!) 142/92 (BP Location: Right arm, Patient Position: Sitting, BP Method: Large (Manual))   Pulse 86   Resp  "16   Ht 5' 5" (1.651 m)   Wt 111.1 kg (245 lb)   SpO2 99%   BMI 40.77 kg/m²     Objective:   Physical Exam   Constitutional: She is oriented to person, place, and time. She appears well-developed and well-nourished. No distress.   Obese   HENT:   Head: Normocephalic and atraumatic.   Nose: Nose normal.   Mouth/Throat: Oropharynx is clear and moist.   Eyes: Conjunctivae and EOM are normal. No scleral icterus.   Neck: Normal range of motion. Neck supple. No JVD present. No thyromegaly present.   Cardiovascular: Normal rate, regular rhythm, S1 normal and S2 normal. Exam reveals no gallop, no S3, no S4 and no friction rub.   No murmur heard.  Pulmonary/Chest: Effort normal and breath sounds normal. No stridor. No respiratory distress. She has no wheezes. She has no rales. She exhibits no tenderness.   Abdominal: Soft. Bowel sounds are normal. She exhibits no distension and no mass. There is no abdominal tenderness. There is no rebound.   Genitourinary:    Genitourinary Comments: Deferred     Musculoskeletal: Normal range of motion.         General: No tenderness, deformity or edema.   Lymphadenopathy:     She has no cervical adenopathy.   Neurological: She is alert and oriented to person, place, and time. She exhibits normal muscle tone. Coordination normal.   Skin: Skin is warm and dry. No rash noted. She is not diaphoretic. No erythema. No pallor.   Psychiatric: She has a normal mood and affect. Her behavior is normal. Judgment and thought content normal.   Nursing note and vitals reviewed.      Lab Results   Component Value Date     07/07/2020    K 4.4 07/07/2020     07/07/2020    CO2 28 07/07/2020    BUN 11 07/07/2020    CREATININE 0.8 07/07/2020    GLU 90 07/07/2020    HGBA1C 5.2 01/29/2019    AST 12 07/07/2020    ALT 22 07/07/2020    ALBUMIN 3.5 07/07/2020    PROT 7.3 07/07/2020    BILITOT 0.3 07/07/2020    WBC 7.22 01/29/2019    HGB 12.2 01/29/2019    HCT 39.7 01/29/2019    MCV 90 01/29/2019    "  (H) 01/29/2019    TSH 1.578 06/15/2017    CHOL 185 11/17/2020    HDL 50 11/17/2020    LDLCALC 117.0 11/17/2020    TRIG 90 11/17/2020     Assessment:      1. Essential hypertension    2. Mixed hyperlipidemia    3. Obesity (BMI 30-39.9)    4. Other irritable bowel syndrome    5. Statin intolerance        Plan:   1. HTN  - cont meds, check BP at home and report - if elevated would increase Norvasc  - low salt diet   - weight loss    2. 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 Insulin resistance - no longer based on recent A1c  - cont meds per PCP    4. Obesity  - need weight loss with diet/exercise     5. Chest pain - resolved  - r/o ischemia with stress test - negative  - 2 D ECHO - normal  - rare snoring/allergies    6. IBS  - cont tx per PCP    Thank you for allowing me to participate in this patient's care. Please do not hesitate to contact me with any questions or concerns. Consult note has been forwarded to the referral physician.

## 2020-12-03 ENCOUNTER — PATIENT MESSAGE (OUTPATIENT)
Dept: CARDIOLOGY | Facility: CLINIC | Age: 40
End: 2020-12-03

## 2020-12-03 DIAGNOSIS — E78.2 MIXED HYPERLIPIDEMIA: ICD-10-CM

## 2020-12-03 DIAGNOSIS — I10 ESSENTIAL HYPERTENSION: ICD-10-CM

## 2020-12-03 RX ORDER — EVOLOCUMAB 140 MG/ML
140 INJECTION, SOLUTION SUBCUTANEOUS
Qty: 2 ML | Refills: 0 | Status: SHIPPED | OUTPATIENT
Start: 2020-12-03 | End: 2021-01-08 | Stop reason: SDUPTHER

## 2020-12-14 ENCOUNTER — SPECIALTY PHARMACY (OUTPATIENT)
Dept: PHARMACY | Facility: CLINIC | Age: 40
End: 2020-12-14

## 2020-12-14 NOTE — TELEPHONE ENCOUNTER
Specialty Pharmacy - Refill Coordination    Specialty Medication Orders Linked to Encounter      Most Recent Value   Medication #1  evolocumab (REPATHA SURECLICK) 140 mg/mL PnIj (Order#[id number], Rx#[id number])          Refill Questions - Documented Responses      Most Recent Value   Relationship to patient of person spoken to?  Self   HIPAA/medical authority confirmed?  Yes   How will the patient receive the medication?  Mail   When does the patient need to receive the medication?  12/17/20   Shipping Address  Home   Refill activity plan  Refill scheduled   Shipment/Pickup Date:  12/15/20          Current Outpatient Medications   Medication Sig    acyclovir (ZOVIRAX) 400 MG tablet TK 1 T PO TID FOR 5 DAYS    amLODIPine (NORVASC) 5 MG tablet Take 1 tablet (5 mg total) by mouth once daily.    buPROPion (WELLBUTRIN XL) 150 MG TB24 tablet Take 1 tablet (150 mg total) by mouth once daily.    evolocumab (REPATHA SURECLICK) 140 mg/mL PnIj Inject 1 mL (140 mg total) into the skin every 14 (fourteen) days.    evolocumab (REPATHA SURECLICK) 140 mg/mL PnIj Inject 1 mL (140 mg total) into the skin every 14 (fourteen) days.    ibuprofen (ADVIL,MOTRIN) 600 MG tablet    Last reviewed on 11/30/2020  4:22 PM by Otf Tirado MD    Review of patient's allergies indicates:  No Known Allergies Last reviewed on  11/30/2020 4:22 PM by Otf Tirado      Tasks added this encounter   No tasks added.   Tasks due within next 3 months   12/4/2020 - Refill Call     [facility]  [address] [place] [id number]  Phone: [phone]  Fax: [phone]

## 2020-12-29 ENCOUNTER — OFFICE VISIT (OUTPATIENT)
Dept: INTERNAL MEDICINE | Facility: CLINIC | Age: 40
End: 2020-12-29
Payer: COMMERCIAL

## 2020-12-29 VITALS
BODY MASS INDEX: 42.69 KG/M2 | HEIGHT: 65 IN | WEIGHT: 256.19 LBS | TEMPERATURE: 98 F | HEART RATE: 80 BPM | DIASTOLIC BLOOD PRESSURE: 89 MMHG | SYSTOLIC BLOOD PRESSURE: 138 MMHG

## 2020-12-29 DIAGNOSIS — I10 HYPERTENSION, UNSPECIFIED TYPE: ICD-10-CM

## 2020-12-29 DIAGNOSIS — Z00.00 ANNUAL PHYSICAL EXAM: Primary | ICD-10-CM

## 2020-12-29 PROCEDURE — 3008F PR BODY MASS INDEX (BMI) DOCUMENTED: ICD-10-PCS | Mod: CPTII,S$GLB,, | Performed by: FAMILY MEDICINE

## 2020-12-29 PROCEDURE — 1126F AMNT PAIN NOTED NONE PRSNT: CPT | Mod: S$GLB,,, | Performed by: FAMILY MEDICINE

## 2020-12-29 PROCEDURE — 3008F BODY MASS INDEX DOCD: CPT | Mod: CPTII,S$GLB,, | Performed by: FAMILY MEDICINE

## 2020-12-29 PROCEDURE — 99395 PR PREVENTIVE VISIT,EST,18-39: ICD-10-PCS | Mod: S$GLB,,, | Performed by: FAMILY MEDICINE

## 2020-12-29 PROCEDURE — 1126F PR PAIN SEVERITY QUANTIFIED, NO PAIN PRESENT: ICD-10-PCS | Mod: S$GLB,,, | Performed by: FAMILY MEDICINE

## 2020-12-29 PROCEDURE — 99999 PR PBB SHADOW E&M-EST. PATIENT-LVL III: CPT | Mod: PBBFAC,,, | Performed by: FAMILY MEDICINE

## 2020-12-29 PROCEDURE — 99999 PR PBB SHADOW E&M-EST. PATIENT-LVL III: ICD-10-PCS | Mod: PBBFAC,,, | Performed by: FAMILY MEDICINE

## 2020-12-29 PROCEDURE — 3075F PR MOST RECENT SYSTOLIC BLOOD PRESS GE 130-139MM HG: ICD-10-PCS | Mod: CPTII,S$GLB,, | Performed by: FAMILY MEDICINE

## 2020-12-29 PROCEDURE — 3079F DIAST BP 80-89 MM HG: CPT | Mod: CPTII,S$GLB,, | Performed by: FAMILY MEDICINE

## 2020-12-29 PROCEDURE — 3075F SYST BP GE 130 - 139MM HG: CPT | Mod: CPTII,S$GLB,, | Performed by: FAMILY MEDICINE

## 2020-12-29 PROCEDURE — 99395 PREV VISIT EST AGE 18-39: CPT | Mod: S$GLB,,, | Performed by: FAMILY MEDICINE

## 2020-12-29 PROCEDURE — 3079F PR MOST RECENT DIASTOLIC BLOOD PRESSURE 80-89 MM HG: ICD-10-PCS | Mod: CPTII,S$GLB,, | Performed by: FAMILY MEDICINE

## 2020-12-29 RX ORDER — BUPROPION HYDROCHLORIDE 150 MG/1
150 TABLET ORAL DAILY
Qty: 90 TABLET | Refills: 3 | Status: SHIPPED | OUTPATIENT
Start: 2020-12-29 | End: 2022-01-13

## 2020-12-29 RX ORDER — AMLODIPINE BESYLATE 5 MG/1
5 TABLET ORAL 2 TIMES DAILY
Qty: 180 TABLET | Refills: 2 | Status: SHIPPED | OUTPATIENT
Start: 2020-12-29 | End: 2021-11-08

## 2020-12-29 NOTE — PROGRESS NOTES
"Subjective:      Patient ID: Niesha Abarca is a 40 y.o. female.    Chief Complaint: Annual Exam    HPI  41 yo here for annual visit.  Her Bp is creeping up//was high when she saw Dr. Tirado last time as well.  Not exercising like she had been during the summer.  Mood is stable  Having increased burping//no other symptoms//worse at bedtime    Past Medical History:   Diagnosis Date    Essential hypertension 2019    Hyperlipidemia     IBS (irritable bowel syndrome)      Family History   Adopted: Yes     Past Surgical History:   Procedure Laterality Date    APPENDECTOMY       SECTION      WISDOM TOOTH EXTRACTION       Social History     Tobacco Use    Smoking status: Former Smoker     Packs/day: 0.50     Years: 15.00     Pack years: 7.50     Types: Cigarettes     Quit date: 2018     Years since quittin.6    Smokeless tobacco: Never Used   Substance Use Topics    Alcohol use: Not Currently     Frequency: Monthly or less     Drinks per session: 1 or 2     Binge frequency: Never    Drug use: No       /89 (BP Location: Left arm, Patient Position: Sitting, BP Method: X-Large (Manual))   Pulse 80   Temp 98.1 °F (36.7 °C) (Temporal)   Ht 5' 5" (1.651 m)   Wt 116.2 kg (256 lb 2.8 oz)   BMI 42.63 kg/m²     Review of Systems   Constitutional: Negative for activity change and unexpected weight change.   HENT: Negative for hearing loss, rhinorrhea and trouble swallowing.    Eyes: Negative for discharge and visual disturbance.   Respiratory: Negative for chest tightness and wheezing.    Cardiovascular: Negative for chest pain and palpitations.   Gastrointestinal: Negative for blood in stool, constipation, diarrhea and vomiting.   Endocrine: Negative for polydipsia and polyuria.   Genitourinary: Negative for difficulty urinating, dysuria, hematuria and menstrual problem.   Musculoskeletal: Negative for arthralgias, joint swelling and neck pain.   Neurological: Negative for weakness and " headaches.   Psychiatric/Behavioral: Negative for confusion and dysphoric mood.       Objective:     Physical Exam  Vitals signs and nursing note reviewed.   Constitutional:       General: She is not in acute distress.     Appearance: She is well-developed.   HENT:      Right Ear: External ear normal.      Left Ear: External ear normal.      Nose: Nose normal.   Eyes:      Conjunctiva/sclera: Conjunctivae normal.      Pupils: Pupils are equal, round, and reactive to light.   Neck:      Musculoskeletal: Normal range of motion and neck supple.      Thyroid: No thyromegaly.   Cardiovascular:      Rate and Rhythm: Normal rate and regular rhythm.      Heart sounds: Normal heart sounds.   Pulmonary:      Effort: Pulmonary effort is normal. No respiratory distress.      Breath sounds: Normal breath sounds. No wheezing or rales.   Abdominal:      General: Bowel sounds are normal. There is no distension.      Palpations: Abdomen is soft.      Tenderness: There is no abdominal tenderness. There is no guarding.   Musculoskeletal:      Right lower leg: No edema.      Left lower leg: No edema.   Skin:     General: Skin is warm and dry.      Findings: No rash.   Neurological:      Mental Status: She is alert and oriented to person, place, and time.      Cranial Nerves: No cranial nerve deficit.   Psychiatric:         Mood and Affect: Mood normal.         Behavior: Behavior normal.         Thought Content: Thought content normal.         Judgment: Judgment normal.         Lab Results   Component Value Date    WBC 7.22 01/29/2019    HGB 12.2 01/29/2019    HCT 39.7 01/29/2019     (H) 01/29/2019    CHOL 185 11/17/2020    TRIG 90 11/17/2020    HDL 50 11/17/2020    ALT 22 07/07/2020    AST 12 07/07/2020     07/07/2020    K 4.4 07/07/2020     07/07/2020    CREATININE 0.8 07/07/2020    BUN 11 07/07/2020    CO2 28 07/07/2020    TSH 1.578 06/15/2017    HGBA1C 5.2 01/29/2019       Assessment:     1. Annual physical exam     2. Hypertension, unspecified type         Plan:     Annual physical exam    Hypertension, unspecified type    Other orders  -     amLODIPine (NORVASC) 5 MG tablet; Take 1 tablet (5 mg total) by mouth 2 (two) times daily.  Dispense: 180 tablet; Refill: 2  -     buPROPion (WELLBUTRIN XL) 150 MG TB24 tablet; Take 1 tablet (150 mg total) by mouth once daily.  Dispense: 90 tablet; Refill: 3      Increase norvasc to 5mg bid  Cont wellbutrin  Try adding pepcid to night time regimen  Healthy diet/exercise  F/u for recheck on Bp in Feb/[place]ardi [place] break

## 2021-01-08 DIAGNOSIS — E78.2 MIXED HYPERLIPIDEMIA: ICD-10-CM

## 2021-01-08 DIAGNOSIS — I10 ESSENTIAL HYPERTENSION: ICD-10-CM

## 2021-01-08 RX ORDER — EVOLOCUMAB 140 MG/ML
140 INJECTION, SOLUTION SUBCUTANEOUS
Qty: 2 ML | Refills: 6 | Status: SHIPPED | OUTPATIENT
Start: 2021-01-08 | End: 2021-10-08 | Stop reason: SDUPTHER

## 2021-01-13 ENCOUNTER — CLINICAL SUPPORT (OUTPATIENT)
Dept: URGENT CARE | Facility: CLINIC | Age: 41
End: 2021-01-13
Payer: COMMERCIAL

## 2021-01-13 ENCOUNTER — PATIENT MESSAGE (OUTPATIENT)
Dept: CARDIOLOGY | Facility: CLINIC | Age: 41
End: 2021-01-13

## 2021-01-13 VITALS
TEMPERATURE: 98 F | DIASTOLIC BLOOD PRESSURE: 92 MMHG | RESPIRATION RATE: 12 BRPM | OXYGEN SATURATION: 97 % | HEART RATE: 85 BPM | SYSTOLIC BLOOD PRESSURE: 155 MMHG

## 2021-01-13 DIAGNOSIS — R09.81 SINUS CONGESTION: Primary | ICD-10-CM

## 2021-01-13 LAB
CTP QC/QA: YES
SARS-COV-2 RDRP RESP QL NAA+PROBE: NEGATIVE

## 2021-01-13 PROCEDURE — U0002: ICD-10-PCS | Mod: QW,S$GLB,, | Performed by: PHYSICIAN ASSISTANT

## 2021-01-13 PROCEDURE — 99211 PR OFFICE/OUTPT VISIT, EST, LEVL I: ICD-10-PCS | Mod: S$GLB,,, | Performed by: PHYSICIAN ASSISTANT

## 2021-01-13 PROCEDURE — U0002 COVID-19 LAB TEST NON-CDC: HCPCS | Mod: QW,S$GLB,, | Performed by: PHYSICIAN ASSISTANT

## 2021-01-13 PROCEDURE — 99211 OFF/OP EST MAY X REQ PHY/QHP: CPT | Mod: S$GLB,,, | Performed by: PHYSICIAN ASSISTANT

## 2021-01-15 ENCOUNTER — SPECIALTY PHARMACY (OUTPATIENT)
Dept: PHARMACY | Facility: CLINIC | Age: 41
End: 2021-01-15

## 2021-02-17 ENCOUNTER — TELEPHONE (OUTPATIENT)
Dept: PHARMACY | Facility: CLINIC | Age: 41
End: 2021-02-17

## 2021-02-22 ENCOUNTER — PATIENT MESSAGE (OUTPATIENT)
Dept: PHARMACY | Facility: CLINIC | Age: 41
End: 2021-02-22

## 2021-02-26 ENCOUNTER — SPECIALTY PHARMACY (OUTPATIENT)
Dept: PHARMACY | Facility: CLINIC | Age: 41
End: 2021-02-26

## 2021-03-03 ENCOUNTER — PATIENT MESSAGE (OUTPATIENT)
Dept: PHARMACY | Facility: CLINIC | Age: 41
End: 2021-03-03

## 2021-03-18 ENCOUNTER — IMMUNIZATION (OUTPATIENT)
Dept: PHARMACY | Facility: CLINIC | Age: 41
End: 2021-03-18
Payer: COMMERCIAL

## 2021-03-18 ENCOUNTER — SPECIALTY PHARMACY (OUTPATIENT)
Dept: PHARMACY | Facility: CLINIC | Age: 41
End: 2021-03-18

## 2021-03-18 DIAGNOSIS — Z23 NEED FOR VACCINATION: Primary | ICD-10-CM

## 2021-03-26 ENCOUNTER — PATIENT MESSAGE (OUTPATIENT)
Dept: PHARMACY | Facility: CLINIC | Age: 41
End: 2021-03-26

## 2021-03-31 ENCOUNTER — SPECIALTY PHARMACY (OUTPATIENT)
Dept: PHARMACY | Facility: CLINIC | Age: 41
End: 2021-03-31

## 2021-04-16 ENCOUNTER — PATIENT MESSAGE (OUTPATIENT)
Dept: CARDIOLOGY | Facility: CLINIC | Age: 41
End: 2021-04-16

## 2021-04-19 ENCOUNTER — HOSPITAL ENCOUNTER (OUTPATIENT)
Dept: CARDIOLOGY | Facility: HOSPITAL | Age: 41
Discharge: HOME | End: 2021-04-19
Attending: INTERNAL MEDICINE
Payer: COMMERCIAL

## 2021-04-19 ENCOUNTER — OFFICE VISIT (OUTPATIENT)
Dept: CARDIOLOGY | Facility: CLINIC | Age: 41
End: 2021-04-19
Payer: COMMERCIAL

## 2021-04-19 VITALS
DIASTOLIC BLOOD PRESSURE: 90 MMHG | RESPIRATION RATE: 16 BRPM | BODY MASS INDEX: 41 KG/M2 | OXYGEN SATURATION: 98 % | SYSTOLIC BLOOD PRESSURE: 130 MMHG | WEIGHT: 246.06 LBS | HEART RATE: 95 BPM | HEIGHT: 65 IN

## 2021-04-19 DIAGNOSIS — Z78.9 STATIN INTOLERANCE: ICD-10-CM

## 2021-04-19 DIAGNOSIS — Z82.49 FAMILY HISTORY OF EARLY CAD: ICD-10-CM

## 2021-04-19 DIAGNOSIS — E78.2 MIXED HYPERLIPIDEMIA: ICD-10-CM

## 2021-04-19 DIAGNOSIS — G47.30 SLEEP APNEA, UNSPECIFIED TYPE: ICD-10-CM

## 2021-04-19 DIAGNOSIS — I10 ESSENTIAL HYPERTENSION: Primary | ICD-10-CM

## 2021-04-19 DIAGNOSIS — I10 ESSENTIAL HYPERTENSION: ICD-10-CM

## 2021-04-19 DIAGNOSIS — E66.9 OBESITY (BMI 30-39.9): ICD-10-CM

## 2021-04-19 DIAGNOSIS — K58.8 OTHER IRRITABLE BOWEL SYNDROME: ICD-10-CM

## 2021-04-19 PROCEDURE — 3080F DIAST BP >= 90 MM HG: CPT | Mod: CPTII,S$GLB,, | Performed by: INTERNAL MEDICINE

## 2021-04-19 PROCEDURE — 3075F PR MOST RECENT SYSTOLIC BLOOD PRESS GE 130-139MM HG: ICD-10-PCS | Mod: CPTII,S$GLB,, | Performed by: INTERNAL MEDICINE

## 2021-04-19 PROCEDURE — 99214 PR OFFICE/OUTPT VISIT, EST, LEVL IV, 30-39 MIN: ICD-10-PCS | Mod: S$GLB,,, | Performed by: INTERNAL MEDICINE

## 2021-04-19 PROCEDURE — 3008F BODY MASS INDEX DOCD: CPT | Mod: CPTII,S$GLB,, | Performed by: INTERNAL MEDICINE

## 2021-04-19 PROCEDURE — 99999 PR PBB SHADOW E&M-EST. PATIENT-LVL IV: ICD-10-PCS | Mod: PBBFAC,,, | Performed by: INTERNAL MEDICINE

## 2021-04-19 PROCEDURE — 99999 PR PBB SHADOW E&M-EST. PATIENT-LVL IV: CPT | Mod: PBBFAC,,, | Performed by: INTERNAL MEDICINE

## 2021-04-19 PROCEDURE — 3008F PR BODY MASS INDEX (BMI) DOCUMENTED: ICD-10-PCS | Mod: CPTII,S$GLB,, | Performed by: INTERNAL MEDICINE

## 2021-04-19 PROCEDURE — 3080F PR MOST RECENT DIASTOLIC BLOOD PRESSURE >= 90 MM HG: ICD-10-PCS | Mod: CPTII,S$GLB,, | Performed by: INTERNAL MEDICINE

## 2021-04-19 PROCEDURE — 93010 EKG 12-LEAD: ICD-10-PCS | Mod: ,,, | Performed by: INTERNAL MEDICINE

## 2021-04-19 PROCEDURE — 99214 OFFICE O/P EST MOD 30 MIN: CPT | Mod: S$GLB,,, | Performed by: INTERNAL MEDICINE

## 2021-04-19 PROCEDURE — 93010 ELECTROCARDIOGRAM REPORT: CPT | Mod: ,,, | Performed by: INTERNAL MEDICINE

## 2021-04-19 PROCEDURE — 93005 ELECTROCARDIOGRAM TRACING: CPT | Mod: PO

## 2021-04-19 PROCEDURE — 3075F SYST BP GE 130 - 139MM HG: CPT | Mod: CPTII,S$GLB,, | Performed by: INTERNAL MEDICINE

## 2021-04-19 RX ORDER — OLMESARTAN MEDOXOMIL 20 MG/1
20 TABLET ORAL DAILY
Qty: 30 TABLET | Refills: 3 | Status: SHIPPED | OUTPATIENT
Start: 2021-04-19 | End: 2021-07-27 | Stop reason: SDUPTHER

## 2021-05-03 ENCOUNTER — PATIENT MESSAGE (OUTPATIENT)
Dept: PHARMACY | Facility: CLINIC | Age: 41
End: 2021-05-03

## 2021-05-13 ENCOUNTER — SPECIALTY PHARMACY (OUTPATIENT)
Dept: PHARMACY | Facility: CLINIC | Age: 41
End: 2021-05-13

## 2021-05-28 ENCOUNTER — LAB VISIT (OUTPATIENT)
Dept: LAB | Facility: HOSPITAL | Age: 41
End: 2021-05-28
Attending: INTERNAL MEDICINE
Payer: COMMERCIAL

## 2021-05-28 DIAGNOSIS — E78.2 MIXED HYPERLIPIDEMIA: ICD-10-CM

## 2021-05-28 LAB
ALBUMIN SERPL BCP-MCNC: 3.6 G/DL (ref 3.5–5.2)
ALP SERPL-CCNC: 75 U/L (ref 55–135)
ALT SERPL W/O P-5'-P-CCNC: 44 U/L (ref 10–44)
ANION GAP SERPL CALC-SCNC: 7 MMOL/L (ref 8–16)
AST SERPL-CCNC: 24 U/L (ref 10–40)
BILIRUB SERPL-MCNC: 0.5 MG/DL (ref 0.1–1)
BUN SERPL-MCNC: 11 MG/DL (ref 6–20)
CALCIUM SERPL-MCNC: 9.6 MG/DL (ref 8.7–10.5)
CHLORIDE SERPL-SCNC: 104 MMOL/L (ref 95–110)
CHOLEST SERPL-MCNC: 203 MG/DL (ref 120–199)
CHOLEST/HDLC SERPL: 4.3 {RATIO} (ref 2–5)
CK SERPL-CCNC: 51 U/L (ref 20–180)
CO2 SERPL-SCNC: 27 MMOL/L (ref 23–29)
CREAT SERPL-MCNC: 0.8 MG/DL (ref 0.5–1.4)
EST. GFR  (AFRICAN AMERICAN): >60 ML/MIN/1.73 M^2
EST. GFR  (NON AFRICAN AMERICAN): >60 ML/MIN/1.73 M^2
GLUCOSE SERPL-MCNC: 91 MG/DL (ref 70–110)
HDLC SERPL-MCNC: 47 MG/DL (ref 40–75)
HDLC SERPL: 23.2 % (ref 20–50)
LDLC SERPL CALC-MCNC: 134.8 MG/DL (ref 63–159)
NONHDLC SERPL-MCNC: 156 MG/DL
POTASSIUM SERPL-SCNC: 4.4 MMOL/L (ref 3.5–5.1)
PROT SERPL-MCNC: 7.4 G/DL (ref 6–8.4)
SODIUM SERPL-SCNC: 138 MMOL/L (ref 136–145)
TRIGL SERPL-MCNC: 106 MG/DL (ref 30–150)

## 2021-05-28 PROCEDURE — 80061 LIPID PANEL: CPT | Performed by: INTERNAL MEDICINE

## 2021-05-28 PROCEDURE — 80053 COMPREHEN METABOLIC PANEL: CPT | Performed by: INTERNAL MEDICINE

## 2021-05-28 PROCEDURE — 82550 ASSAY OF CK (CPK): CPT | Performed by: INTERNAL MEDICINE

## 2021-05-28 PROCEDURE — 36415 COLL VENOUS BLD VENIPUNCTURE: CPT | Mod: PO | Performed by: INTERNAL MEDICINE

## 2021-06-03 ENCOUNTER — TELEPHONE (OUTPATIENT)
Dept: PULMONOLOGY | Facility: HOSPITAL | Age: 41
End: 2021-06-03

## 2021-06-03 ENCOUNTER — PATIENT MESSAGE (OUTPATIENT)
Dept: CARDIOLOGY | Facility: CLINIC | Age: 41
End: 2021-06-03

## 2021-06-03 ENCOUNTER — OFFICE VISIT (OUTPATIENT)
Dept: PULMONOLOGY | Facility: CLINIC | Age: 41
End: 2021-06-03
Payer: COMMERCIAL

## 2021-06-03 VITALS
RESPIRATION RATE: 16 BRPM | BODY MASS INDEX: 39.25 KG/M2 | SYSTOLIC BLOOD PRESSURE: 138 MMHG | OXYGEN SATURATION: 98 % | WEIGHT: 244.25 LBS | HEIGHT: 66 IN | HEART RATE: 96 BPM | DIASTOLIC BLOOD PRESSURE: 78 MMHG

## 2021-06-03 DIAGNOSIS — I10 ESSENTIAL HYPERTENSION: Primary | ICD-10-CM

## 2021-06-03 DIAGNOSIS — E66.01 MORBID OBESITY WITH BMI OF 40.0-44.9, ADULT: ICD-10-CM

## 2021-06-03 DIAGNOSIS — G47.33 OSA (OBSTRUCTIVE SLEEP APNEA): ICD-10-CM

## 2021-06-03 PROCEDURE — 99999 PR PBB SHADOW E&M-EST. PATIENT-LVL IV: CPT | Mod: PBBFAC,,, | Performed by: NURSE PRACTITIONER

## 2021-06-03 PROCEDURE — 99203 PR OFFICE/OUTPT VISIT, NEW, LEVL III, 30-44 MIN: ICD-10-PCS | Mod: S$GLB,,, | Performed by: NURSE PRACTITIONER

## 2021-06-03 PROCEDURE — 99999 PR PBB SHADOW E&M-EST. PATIENT-LVL IV: ICD-10-PCS | Mod: PBBFAC,,, | Performed by: NURSE PRACTITIONER

## 2021-06-03 PROCEDURE — 99203 OFFICE O/P NEW LOW 30 MIN: CPT | Mod: S$GLB,,, | Performed by: NURSE PRACTITIONER

## 2021-06-03 PROCEDURE — 3008F BODY MASS INDEX DOCD: CPT | Mod: CPTII,S$GLB,, | Performed by: NURSE PRACTITIONER

## 2021-06-03 PROCEDURE — 3008F PR BODY MASS INDEX (BMI) DOCUMENTED: ICD-10-PCS | Mod: CPTII,S$GLB,, | Performed by: NURSE PRACTITIONER

## 2021-06-15 ENCOUNTER — SPECIALTY PHARMACY (OUTPATIENT)
Dept: PHARMACY | Facility: CLINIC | Age: 41
End: 2021-06-15

## 2021-06-16 ENCOUNTER — PATIENT MESSAGE (OUTPATIENT)
Dept: SLEEP MEDICINE | Facility: HOSPITAL | Age: 41
End: 2021-06-16

## 2021-06-17 PROCEDURE — 95800 SLP STDY UNATTENDED: CPT | Mod: 26,,, | Performed by: INTERNAL MEDICINE

## 2021-06-17 PROCEDURE — 95800 PR SLEEP STUDY, UNATTENDED, RECORD HEART RATE/O2 SAT/RESP ANAL/SLEEP TIME: ICD-10-PCS | Mod: 26,,, | Performed by: INTERNAL MEDICINE

## 2021-06-21 ENCOUNTER — PROCEDURE VISIT (OUTPATIENT)
Dept: SLEEP MEDICINE | Facility: CLINIC | Age: 41
End: 2021-06-21
Payer: COMMERCIAL

## 2021-06-21 DIAGNOSIS — G47.33 OSA (OBSTRUCTIVE SLEEP APNEA): Primary | ICD-10-CM

## 2021-06-21 PROCEDURE — 95800 SLP STDY UNATTENDED: CPT

## 2021-07-01 ENCOUNTER — OFFICE VISIT (OUTPATIENT)
Dept: PULMONOLOGY | Facility: CLINIC | Age: 41
End: 2021-07-01
Payer: COMMERCIAL

## 2021-07-01 VITALS — BODY MASS INDEX: 39.21 KG/M2 | WEIGHT: 244 LBS | HEIGHT: 66 IN

## 2021-07-01 DIAGNOSIS — E66.01 MORBID OBESITY WITH BMI OF 40.0-44.9, ADULT: ICD-10-CM

## 2021-07-01 DIAGNOSIS — G47.33 OSA (OBSTRUCTIVE SLEEP APNEA): Primary | ICD-10-CM

## 2021-07-01 DIAGNOSIS — I10 ESSENTIAL HYPERTENSION: ICD-10-CM

## 2021-07-01 PROCEDURE — 3008F PR BODY MASS INDEX (BMI) DOCUMENTED: ICD-10-PCS | Mod: CPTII,,, | Performed by: NURSE PRACTITIONER

## 2021-07-01 PROCEDURE — 99213 PR OFFICE/OUTPT VISIT, EST, LEVL III, 20-29 MIN: ICD-10-PCS | Mod: 95,,, | Performed by: NURSE PRACTITIONER

## 2021-07-01 PROCEDURE — 3008F BODY MASS INDEX DOCD: CPT | Mod: CPTII,,, | Performed by: NURSE PRACTITIONER

## 2021-07-01 PROCEDURE — 99213 OFFICE O/P EST LOW 20 MIN: CPT | Mod: 95,,, | Performed by: NURSE PRACTITIONER

## 2021-07-13 ENCOUNTER — PATIENT MESSAGE (OUTPATIENT)
Dept: PHARMACY | Facility: CLINIC | Age: 41
End: 2021-07-13

## 2021-07-23 ENCOUNTER — SPECIALTY PHARMACY (OUTPATIENT)
Dept: PHARMACY | Facility: CLINIC | Age: 41
End: 2021-07-23

## 2021-07-27 ENCOUNTER — OFFICE VISIT (OUTPATIENT)
Dept: CARDIOLOGY | Facility: CLINIC | Age: 41
End: 2021-07-27
Payer: COMMERCIAL

## 2021-07-27 VITALS
BODY MASS INDEX: 39.32 KG/M2 | OXYGEN SATURATION: 99 % | HEART RATE: 103 BPM | WEIGHT: 244.69 LBS | DIASTOLIC BLOOD PRESSURE: 94 MMHG | HEIGHT: 66 IN | SYSTOLIC BLOOD PRESSURE: 148 MMHG

## 2021-07-27 DIAGNOSIS — I10 ESSENTIAL HYPERTENSION: Primary | ICD-10-CM

## 2021-07-27 DIAGNOSIS — E66.9 OBESITY (BMI 30-39.9): ICD-10-CM

## 2021-07-27 DIAGNOSIS — Z82.49 FAMILY HISTORY OF EARLY CAD: ICD-10-CM

## 2021-07-27 DIAGNOSIS — G47.33 OSA (OBSTRUCTIVE SLEEP APNEA): ICD-10-CM

## 2021-07-27 DIAGNOSIS — E78.2 MIXED HYPERLIPIDEMIA: ICD-10-CM

## 2021-07-27 DIAGNOSIS — K21.9 GASTROESOPHAGEAL REFLUX DISEASE, UNSPECIFIED WHETHER ESOPHAGITIS PRESENT: ICD-10-CM

## 2021-07-27 DIAGNOSIS — Z78.9 STATIN INTOLERANCE: ICD-10-CM

## 2021-07-27 PROCEDURE — 1160F RVW MEDS BY RX/DR IN RCRD: CPT | Mod: CPTII,S$GLB,, | Performed by: INTERNAL MEDICINE

## 2021-07-27 PROCEDURE — 1159F PR MEDICATION LIST DOCUMENTED IN MEDICAL RECORD: ICD-10-PCS | Mod: CPTII,S$GLB,, | Performed by: INTERNAL MEDICINE

## 2021-07-27 PROCEDURE — 99214 PR OFFICE/OUTPT VISIT, EST, LEVL IV, 30-39 MIN: ICD-10-PCS | Mod: S$GLB,,, | Performed by: INTERNAL MEDICINE

## 2021-07-27 PROCEDURE — 1159F MED LIST DOCD IN RCRD: CPT | Mod: CPTII,S$GLB,, | Performed by: INTERNAL MEDICINE

## 2021-07-27 PROCEDURE — 3077F PR MOST RECENT SYSTOLIC BLOOD PRESSURE >= 140 MM HG: ICD-10-PCS | Mod: CPTII,S$GLB,, | Performed by: INTERNAL MEDICINE

## 2021-07-27 PROCEDURE — 3080F PR MOST RECENT DIASTOLIC BLOOD PRESSURE >= 90 MM HG: ICD-10-PCS | Mod: CPTII,S$GLB,, | Performed by: INTERNAL MEDICINE

## 2021-07-27 PROCEDURE — 3080F DIAST BP >= 90 MM HG: CPT | Mod: CPTII,S$GLB,, | Performed by: INTERNAL MEDICINE

## 2021-07-27 PROCEDURE — 99214 OFFICE O/P EST MOD 30 MIN: CPT | Mod: S$GLB,,, | Performed by: INTERNAL MEDICINE

## 2021-07-27 PROCEDURE — 99999 PR PBB SHADOW E&M-EST. PATIENT-LVL IV: CPT | Mod: PBBFAC,,, | Performed by: INTERNAL MEDICINE

## 2021-07-27 PROCEDURE — 3077F SYST BP >= 140 MM HG: CPT | Mod: CPTII,S$GLB,, | Performed by: INTERNAL MEDICINE

## 2021-07-27 PROCEDURE — 3008F BODY MASS INDEX DOCD: CPT | Mod: CPTII,S$GLB,, | Performed by: INTERNAL MEDICINE

## 2021-07-27 PROCEDURE — 99999 PR PBB SHADOW E&M-EST. PATIENT-LVL IV: ICD-10-PCS | Mod: PBBFAC,,, | Performed by: INTERNAL MEDICINE

## 2021-07-27 PROCEDURE — 3008F PR BODY MASS INDEX (BMI) DOCUMENTED: ICD-10-PCS | Mod: CPTII,S$GLB,, | Performed by: INTERNAL MEDICINE

## 2021-07-27 PROCEDURE — 1160F PR REVIEW ALL MEDS BY PRESCRIBER/CLIN PHARMACIST DOCUMENTED: ICD-10-PCS | Mod: CPTII,S$GLB,, | Performed by: INTERNAL MEDICINE

## 2021-07-27 RX ORDER — OLMESARTAN MEDOXOMIL 20 MG/1
20 TABLET ORAL NIGHTLY
Qty: 30 TABLET | Refills: 3 | Status: SHIPPED | OUTPATIENT
Start: 2021-07-27 | End: 2021-08-02

## 2021-07-28 ENCOUNTER — PATIENT MESSAGE (OUTPATIENT)
Dept: PULMONOLOGY | Facility: CLINIC | Age: 41
End: 2021-07-28

## 2021-08-02 ENCOUNTER — PATIENT MESSAGE (OUTPATIENT)
Dept: CARDIOLOGY | Facility: CLINIC | Age: 41
End: 2021-08-02

## 2021-08-02 RX ORDER — LOSARTAN POTASSIUM 25 MG/1
25 TABLET ORAL DAILY
Qty: 30 TABLET | Refills: 3 | Status: SHIPPED | OUTPATIENT
Start: 2021-08-02 | End: 2021-10-08 | Stop reason: SDUPTHER

## 2021-08-04 ENCOUNTER — OFFICE VISIT (OUTPATIENT)
Dept: GASTROENTEROLOGY | Facility: CLINIC | Age: 41
End: 2021-08-04
Payer: COMMERCIAL

## 2021-08-04 DIAGNOSIS — K21.9 GASTROESOPHAGEAL REFLUX DISEASE, UNSPECIFIED WHETHER ESOPHAGITIS PRESENT: ICD-10-CM

## 2021-08-04 DIAGNOSIS — R14.2 BELCHING: Primary | ICD-10-CM

## 2021-08-04 PROCEDURE — 99204 PR OFFICE/OUTPT VISIT, NEW, LEVL IV, 45-59 MIN: ICD-10-PCS | Mod: 95,,, | Performed by: INTERNAL MEDICINE

## 2021-08-04 PROCEDURE — 99204 OFFICE O/P NEW MOD 45 MIN: CPT | Mod: 95,,, | Performed by: INTERNAL MEDICINE

## 2021-08-04 RX ORDER — PANTOPRAZOLE SODIUM 40 MG/1
40 TABLET, DELAYED RELEASE ORAL DAILY
Qty: 30 TABLET | Refills: 11 | Status: SHIPPED | OUTPATIENT
Start: 2021-08-04 | End: 2023-02-09 | Stop reason: SDUPTHER

## 2021-08-18 ENCOUNTER — PATIENT MESSAGE (OUTPATIENT)
Dept: PHARMACY | Facility: CLINIC | Age: 41
End: 2021-08-18

## 2021-09-09 ENCOUNTER — PATIENT MESSAGE (OUTPATIENT)
Dept: PHARMACY | Facility: CLINIC | Age: 41
End: 2021-09-09

## 2021-09-09 ENCOUNTER — SPECIALTY PHARMACY (OUTPATIENT)
Dept: PHARMACY | Facility: CLINIC | Age: 41
End: 2021-09-09

## 2021-09-13 ENCOUNTER — PATIENT MESSAGE (OUTPATIENT)
Dept: GASTROENTEROLOGY | Facility: CLINIC | Age: 41
End: 2021-09-13

## 2021-09-20 ENCOUNTER — OFFICE VISIT (OUTPATIENT)
Dept: URGENT CARE | Facility: CLINIC | Age: 41
End: 2021-09-20
Payer: COMMERCIAL

## 2021-09-20 VITALS
SYSTOLIC BLOOD PRESSURE: 142 MMHG | WEIGHT: 240 LBS | OXYGEN SATURATION: 100 % | HEART RATE: 127 BPM | TEMPERATURE: 102 F | BODY MASS INDEX: 39.99 KG/M2 | DIASTOLIC BLOOD PRESSURE: 85 MMHG | HEIGHT: 65 IN | RESPIRATION RATE: 14 BRPM

## 2021-09-20 DIAGNOSIS — J02.0 STREP THROAT: ICD-10-CM

## 2021-09-20 DIAGNOSIS — J02.9 SORE THROAT: Primary | ICD-10-CM

## 2021-09-20 LAB
CTP QC/QA: YES
SARS-COV-2 RDRP RESP QL NAA+PROBE: NEGATIVE

## 2021-09-20 PROCEDURE — 99214 PR OFFICE/OUTPT VISIT, EST, LEVL IV, 30-39 MIN: ICD-10-PCS | Mod: S$GLB,,, | Performed by: PHYSICIAN ASSISTANT

## 2021-09-20 PROCEDURE — 3079F DIAST BP 80-89 MM HG: CPT | Mod: CPTII,S$GLB,, | Performed by: PHYSICIAN ASSISTANT

## 2021-09-20 PROCEDURE — 3079F PR MOST RECENT DIASTOLIC BLOOD PRESSURE 80-89 MM HG: ICD-10-PCS | Mod: CPTII,S$GLB,, | Performed by: PHYSICIAN ASSISTANT

## 2021-09-20 PROCEDURE — 3077F PR MOST RECENT SYSTOLIC BLOOD PRESSURE >= 140 MM HG: ICD-10-PCS | Mod: CPTII,S$GLB,, | Performed by: PHYSICIAN ASSISTANT

## 2021-09-20 PROCEDURE — 1159F PR MEDICATION LIST DOCUMENTED IN MEDICAL RECORD: ICD-10-PCS | Mod: CPTII,S$GLB,, | Performed by: PHYSICIAN ASSISTANT

## 2021-09-20 PROCEDURE — 1160F RVW MEDS BY RX/DR IN RCRD: CPT | Mod: CPTII,S$GLB,, | Performed by: PHYSICIAN ASSISTANT

## 2021-09-20 PROCEDURE — U0002 COVID-19 LAB TEST NON-CDC: HCPCS | Mod: QW,S$GLB,, | Performed by: PHYSICIAN ASSISTANT

## 2021-09-20 PROCEDURE — 4010F ACE/ARB THERAPY RXD/TAKEN: CPT | Mod: CPTII,S$GLB,, | Performed by: PHYSICIAN ASSISTANT

## 2021-09-20 PROCEDURE — 1159F MED LIST DOCD IN RCRD: CPT | Mod: CPTII,S$GLB,, | Performed by: PHYSICIAN ASSISTANT

## 2021-09-20 PROCEDURE — 1160F PR REVIEW ALL MEDS BY PRESCRIBER/CLIN PHARMACIST DOCUMENTED: ICD-10-PCS | Mod: CPTII,S$GLB,, | Performed by: PHYSICIAN ASSISTANT

## 2021-09-20 PROCEDURE — 4010F PR ACE/ARB THEARPY RXD/TAKEN: ICD-10-PCS | Mod: CPTII,S$GLB,, | Performed by: PHYSICIAN ASSISTANT

## 2021-09-20 PROCEDURE — U0002: ICD-10-PCS | Mod: QW,S$GLB,, | Performed by: PHYSICIAN ASSISTANT

## 2021-09-20 PROCEDURE — 3008F BODY MASS INDEX DOCD: CPT | Mod: CPTII,S$GLB,, | Performed by: PHYSICIAN ASSISTANT

## 2021-09-20 PROCEDURE — 3008F PR BODY MASS INDEX (BMI) DOCUMENTED: ICD-10-PCS | Mod: CPTII,S$GLB,, | Performed by: PHYSICIAN ASSISTANT

## 2021-09-20 PROCEDURE — 3077F SYST BP >= 140 MM HG: CPT | Mod: CPTII,S$GLB,, | Performed by: PHYSICIAN ASSISTANT

## 2021-09-20 PROCEDURE — 99214 OFFICE O/P EST MOD 30 MIN: CPT | Mod: S$GLB,,, | Performed by: PHYSICIAN ASSISTANT

## 2021-09-20 RX ORDER — IBUPROFEN 200 MG
800 TABLET ORAL
Status: COMPLETED | OUTPATIENT
Start: 2021-09-20 | End: 2021-09-20

## 2021-09-20 RX ORDER — PENICILLIN V POTASSIUM 500 MG/1
500 TABLET, FILM COATED ORAL 2 TIMES DAILY
Qty: 20 TABLET | Refills: 0 | Status: SHIPPED | OUTPATIENT
Start: 2021-09-20 | End: 2021-09-30

## 2021-09-20 RX ADMIN — Medication 800 MG: at 08:09

## 2021-09-22 ENCOUNTER — OFFICE VISIT (OUTPATIENT)
Dept: URGENT CARE | Facility: CLINIC | Age: 41
End: 2021-09-22
Payer: COMMERCIAL

## 2021-09-22 VITALS
SYSTOLIC BLOOD PRESSURE: 135 MMHG | OXYGEN SATURATION: 100 % | DIASTOLIC BLOOD PRESSURE: 74 MMHG | RESPIRATION RATE: 18 BRPM | TEMPERATURE: 102 F | HEART RATE: 104 BPM | WEIGHT: 240 LBS | BODY MASS INDEX: 39.99 KG/M2 | HEIGHT: 65 IN

## 2021-09-22 DIAGNOSIS — R50.9 FEVER, UNSPECIFIED FEVER CAUSE: ICD-10-CM

## 2021-09-22 DIAGNOSIS — J02.0 STREP PHARYNGITIS: Primary | ICD-10-CM

## 2021-09-22 LAB
CTP QC/QA: YES
CTP QC/QA: YES
POC MOLECULAR INFLUENZA A AGN: NEGATIVE
POC MOLECULAR INFLUENZA B AGN: NEGATIVE
SARS-COV-2 RDRP RESP QL NAA+PROBE: NEGATIVE

## 2021-09-22 PROCEDURE — 3008F BODY MASS INDEX DOCD: CPT | Mod: CPTII,S$GLB,, | Performed by: PHYSICIAN ASSISTANT

## 2021-09-22 PROCEDURE — 96372 PR INJECTION,THERAP/PROPH/DIAG2ST, IM OR SUBCUT: ICD-10-PCS | Mod: S$GLB,,, | Performed by: PHYSICIAN ASSISTANT

## 2021-09-22 PROCEDURE — 4010F ACE/ARB THERAPY RXD/TAKEN: CPT | Mod: CPTII,S$GLB,, | Performed by: PHYSICIAN ASSISTANT

## 2021-09-22 PROCEDURE — U0002: ICD-10-PCS | Mod: QW,S$GLB,, | Performed by: PHYSICIAN ASSISTANT

## 2021-09-22 PROCEDURE — 87502 POCT INFLUENZA A/B MOLECULAR: ICD-10-PCS | Mod: QW,S$GLB,, | Performed by: PHYSICIAN ASSISTANT

## 2021-09-22 PROCEDURE — 3078F PR MOST RECENT DIASTOLIC BLOOD PRESSURE < 80 MM HG: ICD-10-PCS | Mod: CPTII,S$GLB,, | Performed by: PHYSICIAN ASSISTANT

## 2021-09-22 PROCEDURE — 1159F PR MEDICATION LIST DOCUMENTED IN MEDICAL RECORD: ICD-10-PCS | Mod: CPTII,S$GLB,, | Performed by: PHYSICIAN ASSISTANT

## 2021-09-22 PROCEDURE — 3008F PR BODY MASS INDEX (BMI) DOCUMENTED: ICD-10-PCS | Mod: CPTII,S$GLB,, | Performed by: PHYSICIAN ASSISTANT

## 2021-09-22 PROCEDURE — 3078F DIAST BP <80 MM HG: CPT | Mod: CPTII,S$GLB,, | Performed by: PHYSICIAN ASSISTANT

## 2021-09-22 PROCEDURE — 4010F PR ACE/ARB THEARPY RXD/TAKEN: ICD-10-PCS | Mod: CPTII,S$GLB,, | Performed by: PHYSICIAN ASSISTANT

## 2021-09-22 PROCEDURE — 87070 CULTURE OTHR SPECIMN AEROBIC: CPT | Performed by: PHYSICIAN ASSISTANT

## 2021-09-22 PROCEDURE — 96372 THER/PROPH/DIAG INJ SC/IM: CPT | Mod: S$GLB,,, | Performed by: PHYSICIAN ASSISTANT

## 2021-09-22 PROCEDURE — 99214 PR OFFICE/OUTPT VISIT, EST, LEVL IV, 30-39 MIN: ICD-10-PCS | Mod: 25,S$GLB,CS, | Performed by: PHYSICIAN ASSISTANT

## 2021-09-22 PROCEDURE — U0002 COVID-19 LAB TEST NON-CDC: HCPCS | Mod: QW,S$GLB,, | Performed by: PHYSICIAN ASSISTANT

## 2021-09-22 PROCEDURE — 87502 INFLUENZA DNA AMP PROBE: CPT | Mod: QW,S$GLB,, | Performed by: PHYSICIAN ASSISTANT

## 2021-09-22 PROCEDURE — 99214 OFFICE O/P EST MOD 30 MIN: CPT | Mod: 25,S$GLB,CS, | Performed by: PHYSICIAN ASSISTANT

## 2021-09-22 PROCEDURE — 3075F PR MOST RECENT SYSTOLIC BLOOD PRESS GE 130-139MM HG: ICD-10-PCS | Mod: CPTII,S$GLB,, | Performed by: PHYSICIAN ASSISTANT

## 2021-09-22 PROCEDURE — 3075F SYST BP GE 130 - 139MM HG: CPT | Mod: CPTII,S$GLB,, | Performed by: PHYSICIAN ASSISTANT

## 2021-09-22 PROCEDURE — 1159F MED LIST DOCD IN RCRD: CPT | Mod: CPTII,S$GLB,, | Performed by: PHYSICIAN ASSISTANT

## 2021-09-22 RX ORDER — CEFTRIAXONE 1 G/1
1 INJECTION, POWDER, FOR SOLUTION INTRAMUSCULAR; INTRAVENOUS
Status: COMPLETED | OUTPATIENT
Start: 2021-09-22 | End: 2021-09-22

## 2021-09-22 RX ADMIN — CEFTRIAXONE 1 G: 1 INJECTION, POWDER, FOR SOLUTION INTRAMUSCULAR; INTRAVENOUS at 08:09

## 2021-09-23 ENCOUNTER — TELEPHONE (OUTPATIENT)
Dept: URGENT CARE | Facility: CLINIC | Age: 41
End: 2021-09-23

## 2021-09-24 ENCOUNTER — TELEPHONE (OUTPATIENT)
Dept: URGENT CARE | Facility: CLINIC | Age: 41
End: 2021-09-24

## 2021-09-24 LAB — BACTERIA THROAT CULT: NORMAL

## 2021-09-28 ENCOUNTER — LAB VISIT (OUTPATIENT)
Dept: LAB | Facility: HOSPITAL | Age: 41
End: 2021-09-28
Attending: INTERNAL MEDICINE
Payer: COMMERCIAL

## 2021-09-28 DIAGNOSIS — E78.2 MIXED HYPERLIPIDEMIA: ICD-10-CM

## 2021-09-28 LAB
CHOLEST SERPL-MCNC: 187 MG/DL (ref 120–199)
CHOLEST/HDLC SERPL: 4.6 {RATIO} (ref 2–5)
HDLC SERPL-MCNC: 41 MG/DL (ref 40–75)
HDLC SERPL: 21.9 % (ref 20–50)
LDLC SERPL CALC-MCNC: 126 MG/DL (ref 63–159)
NONHDLC SERPL-MCNC: 146 MG/DL
TRIGL SERPL-MCNC: 100 MG/DL (ref 30–150)

## 2021-09-28 PROCEDURE — 36415 COLL VENOUS BLD VENIPUNCTURE: CPT | Mod: PO | Performed by: INTERNAL MEDICINE

## 2021-09-28 PROCEDURE — 80061 LIPID PANEL: CPT | Performed by: INTERNAL MEDICINE

## 2021-10-08 ENCOUNTER — OFFICE VISIT (OUTPATIENT)
Dept: CARDIOLOGY | Facility: CLINIC | Age: 41
End: 2021-10-08
Payer: COMMERCIAL

## 2021-10-08 VITALS
SYSTOLIC BLOOD PRESSURE: 152 MMHG | DIASTOLIC BLOOD PRESSURE: 86 MMHG | RESPIRATION RATE: 16 BRPM | BODY MASS INDEX: 40.26 KG/M2 | OXYGEN SATURATION: 99 % | HEIGHT: 65 IN | HEART RATE: 102 BPM | WEIGHT: 241.63 LBS

## 2021-10-08 DIAGNOSIS — K58.8 OTHER IRRITABLE BOWEL SYNDROME: ICD-10-CM

## 2021-10-08 DIAGNOSIS — G47.30 SLEEP APNEA, UNSPECIFIED TYPE: ICD-10-CM

## 2021-10-08 DIAGNOSIS — I10 ESSENTIAL HYPERTENSION: Primary | ICD-10-CM

## 2021-10-08 DIAGNOSIS — K21.9 GASTROESOPHAGEAL REFLUX DISEASE, UNSPECIFIED WHETHER ESOPHAGITIS PRESENT: ICD-10-CM

## 2021-10-08 DIAGNOSIS — Z78.9 STATIN INTOLERANCE: ICD-10-CM

## 2021-10-08 DIAGNOSIS — G47.33 OSA (OBSTRUCTIVE SLEEP APNEA): ICD-10-CM

## 2021-10-08 DIAGNOSIS — Z82.49 FAMILY HISTORY OF EARLY CAD: ICD-10-CM

## 2021-10-08 DIAGNOSIS — E66.9 OBESITY (BMI 30-39.9): ICD-10-CM

## 2021-10-08 DIAGNOSIS — E78.2 MIXED HYPERLIPIDEMIA: ICD-10-CM

## 2021-10-08 DIAGNOSIS — R07.9 CHEST PAIN, UNSPECIFIED TYPE: ICD-10-CM

## 2021-10-08 PROCEDURE — 99999 PR PBB SHADOW E&M-EST. PATIENT-LVL IV: ICD-10-PCS | Mod: PBBFAC,,, | Performed by: INTERNAL MEDICINE

## 2021-10-08 PROCEDURE — 3077F SYST BP >= 140 MM HG: CPT | Mod: CPTII,S$GLB,, | Performed by: INTERNAL MEDICINE

## 2021-10-08 PROCEDURE — 1159F PR MEDICATION LIST DOCUMENTED IN MEDICAL RECORD: ICD-10-PCS | Mod: CPTII,S$GLB,, | Performed by: INTERNAL MEDICINE

## 2021-10-08 PROCEDURE — 3008F BODY MASS INDEX DOCD: CPT | Mod: CPTII,S$GLB,, | Performed by: INTERNAL MEDICINE

## 2021-10-08 PROCEDURE — 3077F PR MOST RECENT SYSTOLIC BLOOD PRESSURE >= 140 MM HG: ICD-10-PCS | Mod: CPTII,S$GLB,, | Performed by: INTERNAL MEDICINE

## 2021-10-08 PROCEDURE — 99214 PR OFFICE/OUTPT VISIT, EST, LEVL IV, 30-39 MIN: ICD-10-PCS | Mod: S$GLB,,, | Performed by: INTERNAL MEDICINE

## 2021-10-08 PROCEDURE — 3008F PR BODY MASS INDEX (BMI) DOCUMENTED: ICD-10-PCS | Mod: CPTII,S$GLB,, | Performed by: INTERNAL MEDICINE

## 2021-10-08 PROCEDURE — 1160F RVW MEDS BY RX/DR IN RCRD: CPT | Mod: CPTII,S$GLB,, | Performed by: INTERNAL MEDICINE

## 2021-10-08 PROCEDURE — 99999 PR PBB SHADOW E&M-EST. PATIENT-LVL IV: CPT | Mod: PBBFAC,,, | Performed by: INTERNAL MEDICINE

## 2021-10-08 PROCEDURE — 4010F PR ACE/ARB THEARPY RXD/TAKEN: ICD-10-PCS | Mod: CPTII,S$GLB,, | Performed by: INTERNAL MEDICINE

## 2021-10-08 PROCEDURE — 3079F DIAST BP 80-89 MM HG: CPT | Mod: CPTII,S$GLB,, | Performed by: INTERNAL MEDICINE

## 2021-10-08 PROCEDURE — 1159F MED LIST DOCD IN RCRD: CPT | Mod: CPTII,S$GLB,, | Performed by: INTERNAL MEDICINE

## 2021-10-08 PROCEDURE — 4010F ACE/ARB THERAPY RXD/TAKEN: CPT | Mod: CPTII,S$GLB,, | Performed by: INTERNAL MEDICINE

## 2021-10-08 PROCEDURE — 3079F PR MOST RECENT DIASTOLIC BLOOD PRESSURE 80-89 MM HG: ICD-10-PCS | Mod: CPTII,S$GLB,, | Performed by: INTERNAL MEDICINE

## 2021-10-08 PROCEDURE — 1160F PR REVIEW ALL MEDS BY PRESCRIBER/CLIN PHARMACIST DOCUMENTED: ICD-10-PCS | Mod: CPTII,S$GLB,, | Performed by: INTERNAL MEDICINE

## 2021-10-08 PROCEDURE — 99214 OFFICE O/P EST MOD 30 MIN: CPT | Mod: S$GLB,,, | Performed by: INTERNAL MEDICINE

## 2021-10-08 RX ORDER — LOSARTAN POTASSIUM 25 MG/1
25 TABLET ORAL DAILY
Qty: 90 TABLET | Refills: 1 | Status: SHIPPED | OUTPATIENT
Start: 2021-10-08 | End: 2022-01-12 | Stop reason: SDUPTHER

## 2021-10-08 RX ORDER — EVOLOCUMAB 140 MG/ML
140 INJECTION, SOLUTION SUBCUTANEOUS
Qty: 2 ML | Refills: 6 | OUTPATIENT
Start: 2021-10-08 | End: 2021-11-05

## 2021-10-25 ENCOUNTER — SPECIALTY PHARMACY (OUTPATIENT)
Dept: PHARMACY | Facility: CLINIC | Age: 41
End: 2021-10-25
Payer: COMMERCIAL

## 2021-11-02 ENCOUNTER — LAB VISIT (OUTPATIENT)
Dept: LAB | Facility: HOSPITAL | Age: 41
End: 2021-11-02
Attending: INTERNAL MEDICINE
Payer: COMMERCIAL

## 2021-11-02 DIAGNOSIS — R14.2 BELCHING: ICD-10-CM

## 2021-11-02 PROCEDURE — 87338 HPYLORI STOOL AG IA: CPT | Performed by: INTERNAL MEDICINE

## 2021-11-09 LAB
H PYLORI AG STL QL IA: NORMAL
SPECIMEN SOURCE: NORMAL

## 2021-11-20 ENCOUNTER — SPECIALTY PHARMACY (OUTPATIENT)
Dept: PHARMACY | Facility: CLINIC | Age: 41
End: 2021-11-20
Payer: COMMERCIAL

## 2021-12-01 ENCOUNTER — OFFICE VISIT (OUTPATIENT)
Dept: GASTROENTEROLOGY | Facility: CLINIC | Age: 41
End: 2021-12-01
Payer: COMMERCIAL

## 2021-12-01 VITALS
DIASTOLIC BLOOD PRESSURE: 80 MMHG | OXYGEN SATURATION: 97 % | BODY MASS INDEX: 40.91 KG/M2 | HEIGHT: 65 IN | HEART RATE: 88 BPM | WEIGHT: 245.56 LBS | SYSTOLIC BLOOD PRESSURE: 138 MMHG

## 2021-12-01 DIAGNOSIS — K21.9 GASTROESOPHAGEAL REFLUX DISEASE, UNSPECIFIED WHETHER ESOPHAGITIS PRESENT: ICD-10-CM

## 2021-12-01 DIAGNOSIS — K58.8 OTHER IRRITABLE BOWEL SYNDROME: ICD-10-CM

## 2021-12-01 DIAGNOSIS — K62.5 RECTAL BLEEDING: Primary | ICD-10-CM

## 2021-12-01 DIAGNOSIS — R14.2 BELCHING: ICD-10-CM

## 2021-12-01 PROCEDURE — 99214 OFFICE O/P EST MOD 30 MIN: CPT | Mod: S$GLB,,, | Performed by: INTERNAL MEDICINE

## 2021-12-01 PROCEDURE — 4010F ACE/ARB THERAPY RXD/TAKEN: CPT | Mod: CPTII,S$GLB,, | Performed by: INTERNAL MEDICINE

## 2021-12-01 PROCEDURE — 99214 PR OFFICE/OUTPT VISIT, EST, LEVL IV, 30-39 MIN: ICD-10-PCS | Mod: S$GLB,,, | Performed by: INTERNAL MEDICINE

## 2021-12-01 PROCEDURE — 99999 PR PBB SHADOW E&M-EST. PATIENT-LVL III: CPT | Mod: PBBFAC,,, | Performed by: INTERNAL MEDICINE

## 2021-12-01 PROCEDURE — 99999 PR PBB SHADOW E&M-EST. PATIENT-LVL III: ICD-10-PCS | Mod: PBBFAC,,, | Performed by: INTERNAL MEDICINE

## 2021-12-01 PROCEDURE — 4010F PR ACE/ARB THEARPY RXD/TAKEN: ICD-10-PCS | Mod: CPTII,S$GLB,, | Performed by: INTERNAL MEDICINE

## 2021-12-01 RX ORDER — SOD SULF/POT CHLORIDE/MAG SULF 1.479 G
12 TABLET ORAL DAILY
Qty: 24 TABLET | Refills: 0 | Status: SHIPPED | OUTPATIENT
Start: 2021-12-01 | End: 2022-07-20

## 2021-12-14 ENCOUNTER — PATIENT MESSAGE (OUTPATIENT)
Dept: PHARMACY | Facility: CLINIC | Age: 41
End: 2021-12-14
Payer: COMMERCIAL

## 2021-12-14 DIAGNOSIS — I10 ESSENTIAL HYPERTENSION: ICD-10-CM

## 2021-12-14 DIAGNOSIS — E78.2 MIXED HYPERLIPIDEMIA: ICD-10-CM

## 2021-12-14 RX ORDER — EVOLOCUMAB 140 MG/ML
140 INJECTION, SOLUTION SUBCUTANEOUS
Qty: 2 ML | Refills: 2 | Status: SHIPPED | OUTPATIENT
Start: 2021-12-14 | End: 2022-05-03 | Stop reason: SDUPTHER

## 2021-12-15 ENCOUNTER — SPECIALTY PHARMACY (OUTPATIENT)
Dept: PHARMACY | Facility: CLINIC | Age: 41
End: 2021-12-15
Payer: COMMERCIAL

## 2021-12-22 ENCOUNTER — TELEPHONE (OUTPATIENT)
Dept: PREADMISSION TESTING | Facility: HOSPITAL | Age: 41
End: 2021-12-22
Payer: COMMERCIAL

## 2021-12-26 ENCOUNTER — PATIENT MESSAGE (OUTPATIENT)
Dept: ADMINISTRATIVE | Facility: OTHER | Age: 41
End: 2021-12-26
Payer: COMMERCIAL

## 2021-12-26 ENCOUNTER — OFFICE VISIT (OUTPATIENT)
Dept: URGENT CARE | Facility: CLINIC | Age: 41
End: 2021-12-26
Payer: COMMERCIAL

## 2021-12-26 VITALS
WEIGHT: 245 LBS | TEMPERATURE: 99 F | HEART RATE: 107 BPM | SYSTOLIC BLOOD PRESSURE: 136 MMHG | DIASTOLIC BLOOD PRESSURE: 91 MMHG | RESPIRATION RATE: 18 BRPM | BODY MASS INDEX: 40.82 KG/M2 | HEIGHT: 65 IN | OXYGEN SATURATION: 97 %

## 2021-12-26 DIAGNOSIS — U07.1 COVID-19 VIRUS INFECTION: Primary | ICD-10-CM

## 2021-12-26 DIAGNOSIS — R05.9 COUGH: ICD-10-CM

## 2021-12-26 DIAGNOSIS — U07.1 COVID-19 VIRUS DETECTED: ICD-10-CM

## 2021-12-26 LAB
CTP QC/QA: YES
CTP QC/QA: YES
POC MOLECULAR INFLUENZA A AGN: NEGATIVE
POC MOLECULAR INFLUENZA B AGN: NEGATIVE
SARS-COV-2 RDRP RESP QL NAA+PROBE: POSITIVE

## 2021-12-26 PROCEDURE — 99213 OFFICE O/P EST LOW 20 MIN: CPT | Mod: S$GLB,,, | Performed by: PHYSICIAN ASSISTANT

## 2021-12-26 PROCEDURE — 3008F PR BODY MASS INDEX (BMI) DOCUMENTED: ICD-10-PCS | Mod: CPTII,S$GLB,, | Performed by: PHYSICIAN ASSISTANT

## 2021-12-26 PROCEDURE — 3075F PR MOST RECENT SYSTOLIC BLOOD PRESS GE 130-139MM HG: ICD-10-PCS | Mod: CPTII,S$GLB,, | Performed by: PHYSICIAN ASSISTANT

## 2021-12-26 PROCEDURE — U0002: ICD-10-PCS | Mod: QW,S$GLB,, | Performed by: PHYSICIAN ASSISTANT

## 2021-12-26 PROCEDURE — 1159F MED LIST DOCD IN RCRD: CPT | Mod: CPTII,S$GLB,, | Performed by: PHYSICIAN ASSISTANT

## 2021-12-26 PROCEDURE — 3008F BODY MASS INDEX DOCD: CPT | Mod: CPTII,S$GLB,, | Performed by: PHYSICIAN ASSISTANT

## 2021-12-26 PROCEDURE — 99213 PR OFFICE/OUTPT VISIT, EST, LEVL III, 20-29 MIN: ICD-10-PCS | Mod: S$GLB,,, | Performed by: PHYSICIAN ASSISTANT

## 2021-12-26 PROCEDURE — 87502 INFLUENZA DNA AMP PROBE: CPT | Mod: QW,S$GLB,, | Performed by: PHYSICIAN ASSISTANT

## 2021-12-26 PROCEDURE — 3075F SYST BP GE 130 - 139MM HG: CPT | Mod: CPTII,S$GLB,, | Performed by: PHYSICIAN ASSISTANT

## 2021-12-26 PROCEDURE — 3080F DIAST BP >= 90 MM HG: CPT | Mod: CPTII,S$GLB,, | Performed by: PHYSICIAN ASSISTANT

## 2021-12-26 PROCEDURE — 1160F RVW MEDS BY RX/DR IN RCRD: CPT | Mod: CPTII,S$GLB,, | Performed by: PHYSICIAN ASSISTANT

## 2021-12-26 PROCEDURE — 87502 POCT INFLUENZA A/B MOLECULAR: ICD-10-PCS | Mod: QW,S$GLB,, | Performed by: PHYSICIAN ASSISTANT

## 2021-12-26 PROCEDURE — 4010F ACE/ARB THERAPY RXD/TAKEN: CPT | Mod: CPTII,S$GLB,, | Performed by: PHYSICIAN ASSISTANT

## 2021-12-26 PROCEDURE — 1159F PR MEDICATION LIST DOCUMENTED IN MEDICAL RECORD: ICD-10-PCS | Mod: CPTII,S$GLB,, | Performed by: PHYSICIAN ASSISTANT

## 2021-12-26 PROCEDURE — U0002 COVID-19 LAB TEST NON-CDC: HCPCS | Mod: QW,S$GLB,, | Performed by: PHYSICIAN ASSISTANT

## 2021-12-26 PROCEDURE — 3080F PR MOST RECENT DIASTOLIC BLOOD PRESSURE >= 90 MM HG: ICD-10-PCS | Mod: CPTII,S$GLB,, | Performed by: PHYSICIAN ASSISTANT

## 2021-12-26 PROCEDURE — 4010F PR ACE/ARB THEARPY RXD/TAKEN: ICD-10-PCS | Mod: CPTII,S$GLB,, | Performed by: PHYSICIAN ASSISTANT

## 2021-12-26 PROCEDURE — 1160F PR REVIEW ALL MEDS BY PRESCRIBER/CLIN PHARMACIST DOCUMENTED: ICD-10-PCS | Mod: CPTII,S$GLB,, | Performed by: PHYSICIAN ASSISTANT

## 2021-12-27 ENCOUNTER — INFUSION (OUTPATIENT)
Dept: INFECTIOUS DISEASES | Facility: HOSPITAL | Age: 41
End: 2021-12-27
Attending: PHYSICIAN ASSISTANT
Payer: COMMERCIAL

## 2021-12-27 VITALS
TEMPERATURE: 98 F | HEART RATE: 97 BPM | SYSTOLIC BLOOD PRESSURE: 127 MMHG | OXYGEN SATURATION: 98 % | RESPIRATION RATE: 14 BRPM | DIASTOLIC BLOOD PRESSURE: 77 MMHG

## 2021-12-27 DIAGNOSIS — U07.1 COVID-19: Primary | ICD-10-CM

## 2021-12-27 PROCEDURE — 25000003 PHARM REV CODE 250: Performed by: INTERNAL MEDICINE

## 2021-12-27 PROCEDURE — 63600175 PHARM REV CODE 636 W HCPCS: Performed by: INTERNAL MEDICINE

## 2021-12-27 PROCEDURE — M0243 CASIRIVI AND IMDEVI INFUSION: HCPCS | Performed by: INTERNAL MEDICINE

## 2021-12-27 RX ORDER — ONDANSETRON 4 MG/1
4 TABLET, ORALLY DISINTEGRATING ORAL ONCE AS NEEDED
Status: DISCONTINUED | OUTPATIENT
Start: 2021-12-27 | End: 2022-07-20

## 2021-12-27 RX ORDER — EPINEPHRINE 0.3 MG/.3ML
0.3 INJECTION SUBCUTANEOUS
Status: DISCONTINUED | OUTPATIENT
Start: 2021-12-27 | End: 2022-07-20

## 2021-12-27 RX ORDER — ALBUTEROL SULFATE 90 UG/1
2 AEROSOL, METERED RESPIRATORY (INHALATION)
Status: DISCONTINUED | OUTPATIENT
Start: 2021-12-27 | End: 2022-07-20

## 2021-12-27 RX ORDER — DIPHENHYDRAMINE HYDROCHLORIDE 50 MG/ML
25 INJECTION INTRAMUSCULAR; INTRAVENOUS ONCE AS NEEDED
Status: DISCONTINUED | OUTPATIENT
Start: 2021-12-27 | End: 2022-07-20

## 2021-12-27 RX ORDER — SODIUM CHLORIDE 0.9 % (FLUSH) 0.9 %
10 SYRINGE (ML) INJECTION
Status: DISCONTINUED | OUTPATIENT
Start: 2021-12-27 | End: 2022-07-20

## 2021-12-27 RX ORDER — ACETAMINOPHEN 325 MG/1
650 TABLET ORAL ONCE AS NEEDED
Status: DISCONTINUED | OUTPATIENT
Start: 2021-12-27 | End: 2022-07-20

## 2021-12-27 RX ADMIN — CASIRIVIMAB AND IMDEVIMAB 600 MG: 600; 600 INJECTION, SOLUTION, CONCENTRATE INTRAVENOUS at 11:12

## 2022-01-11 DIAGNOSIS — I10 ESSENTIAL HYPERTENSION: Primary | ICD-10-CM

## 2022-01-12 ENCOUNTER — OFFICE VISIT (OUTPATIENT)
Dept: CARDIOLOGY | Facility: CLINIC | Age: 42
End: 2022-01-12
Payer: COMMERCIAL

## 2022-01-12 ENCOUNTER — PATIENT MESSAGE (OUTPATIENT)
Dept: PHARMACY | Facility: CLINIC | Age: 42
End: 2022-01-12
Payer: COMMERCIAL

## 2022-01-12 ENCOUNTER — HOSPITAL ENCOUNTER (OUTPATIENT)
Dept: CARDIOLOGY | Facility: HOSPITAL | Age: 42
Discharge: HOME | End: 2022-01-12
Attending: INTERNAL MEDICINE
Payer: COMMERCIAL

## 2022-01-12 VITALS
HEART RATE: 82 BPM | SYSTOLIC BLOOD PRESSURE: 150 MMHG | DIASTOLIC BLOOD PRESSURE: 88 MMHG | WEIGHT: 244 LBS | HEIGHT: 65 IN | OXYGEN SATURATION: 100 % | RESPIRATION RATE: 16 BRPM | BODY MASS INDEX: 40.65 KG/M2

## 2022-01-12 DIAGNOSIS — G47.33 OSA (OBSTRUCTIVE SLEEP APNEA): ICD-10-CM

## 2022-01-12 DIAGNOSIS — K58.8 OTHER IRRITABLE BOWEL SYNDROME: ICD-10-CM

## 2022-01-12 DIAGNOSIS — E66.9 OBESITY (BMI 30-39.9): ICD-10-CM

## 2022-01-12 DIAGNOSIS — I10 ESSENTIAL HYPERTENSION: Primary | ICD-10-CM

## 2022-01-12 DIAGNOSIS — K21.9 GASTROESOPHAGEAL REFLUX DISEASE, UNSPECIFIED WHETHER ESOPHAGITIS PRESENT: ICD-10-CM

## 2022-01-12 DIAGNOSIS — E78.2 MIXED HYPERLIPIDEMIA: ICD-10-CM

## 2022-01-12 DIAGNOSIS — Z86.16 HISTORY OF COVID-19: ICD-10-CM

## 2022-01-12 DIAGNOSIS — I10 ESSENTIAL HYPERTENSION: ICD-10-CM

## 2022-01-12 DIAGNOSIS — Z78.9 STATIN INTOLERANCE: ICD-10-CM

## 2022-01-12 DIAGNOSIS — G47.30 SLEEP APNEA, UNSPECIFIED TYPE: ICD-10-CM

## 2022-01-12 PROCEDURE — 1159F PR MEDICATION LIST DOCUMENTED IN MEDICAL RECORD: ICD-10-PCS | Mod: CPTII,S$GLB,, | Performed by: INTERNAL MEDICINE

## 2022-01-12 PROCEDURE — 3077F SYST BP >= 140 MM HG: CPT | Mod: CPTII,S$GLB,, | Performed by: INTERNAL MEDICINE

## 2022-01-12 PROCEDURE — 99214 PR OFFICE/OUTPT VISIT, EST, LEVL IV, 30-39 MIN: ICD-10-PCS | Mod: S$GLB,,, | Performed by: INTERNAL MEDICINE

## 2022-01-12 PROCEDURE — 99999 PR PBB SHADOW E&M-EST. PATIENT-LVL IV: ICD-10-PCS | Mod: PBBFAC,,, | Performed by: INTERNAL MEDICINE

## 2022-01-12 PROCEDURE — 3008F PR BODY MASS INDEX (BMI) DOCUMENTED: ICD-10-PCS | Mod: CPTII,S$GLB,, | Performed by: INTERNAL MEDICINE

## 2022-01-12 PROCEDURE — 3077F PR MOST RECENT SYSTOLIC BLOOD PRESSURE >= 140 MM HG: ICD-10-PCS | Mod: CPTII,S$GLB,, | Performed by: INTERNAL MEDICINE

## 2022-01-12 PROCEDURE — 93010 EKG 12-LEAD: ICD-10-PCS | Mod: ,,, | Performed by: STUDENT IN AN ORGANIZED HEALTH CARE EDUCATION/TRAINING PROGRAM

## 2022-01-12 PROCEDURE — 3079F PR MOST RECENT DIASTOLIC BLOOD PRESSURE 80-89 MM HG: ICD-10-PCS | Mod: CPTII,S$GLB,, | Performed by: INTERNAL MEDICINE

## 2022-01-12 PROCEDURE — 93005 ELECTROCARDIOGRAM TRACING: CPT

## 2022-01-12 PROCEDURE — 1160F RVW MEDS BY RX/DR IN RCRD: CPT | Mod: CPTII,S$GLB,, | Performed by: INTERNAL MEDICINE

## 2022-01-12 PROCEDURE — 99214 OFFICE O/P EST MOD 30 MIN: CPT | Mod: S$GLB,,, | Performed by: INTERNAL MEDICINE

## 2022-01-12 PROCEDURE — 99999 PR PBB SHADOW E&M-EST. PATIENT-LVL IV: CPT | Mod: PBBFAC,,, | Performed by: INTERNAL MEDICINE

## 2022-01-12 PROCEDURE — 1160F PR REVIEW ALL MEDS BY PRESCRIBER/CLIN PHARMACIST DOCUMENTED: ICD-10-PCS | Mod: CPTII,S$GLB,, | Performed by: INTERNAL MEDICINE

## 2022-01-12 PROCEDURE — 3008F BODY MASS INDEX DOCD: CPT | Mod: CPTII,S$GLB,, | Performed by: INTERNAL MEDICINE

## 2022-01-12 PROCEDURE — 3079F DIAST BP 80-89 MM HG: CPT | Mod: CPTII,S$GLB,, | Performed by: INTERNAL MEDICINE

## 2022-01-12 PROCEDURE — 93010 ELECTROCARDIOGRAM REPORT: CPT | Mod: ,,, | Performed by: STUDENT IN AN ORGANIZED HEALTH CARE EDUCATION/TRAINING PROGRAM

## 2022-01-12 PROCEDURE — 1159F MED LIST DOCD IN RCRD: CPT | Mod: CPTII,S$GLB,, | Performed by: INTERNAL MEDICINE

## 2022-01-12 RX ORDER — AMLODIPINE BESYLATE 5 MG/1
5 TABLET ORAL 2 TIMES DAILY
Qty: 180 TABLET | Refills: 2 | Status: SHIPPED | OUTPATIENT
Start: 2022-01-12 | End: 2022-07-14 | Stop reason: SDUPTHER

## 2022-01-12 RX ORDER — LOSARTAN POTASSIUM 25 MG/1
25 TABLET ORAL DAILY
Qty: 90 TABLET | Refills: 1 | Status: SHIPPED | OUTPATIENT
Start: 2022-01-12 | End: 2022-07-14 | Stop reason: SDUPTHER

## 2022-01-12 NOTE — TELEPHONE ENCOUNTER
No new care gaps identified.  Powered by [employer]. Reference number: [id number].   1/12/2022 5:56:20 PM CST

## 2022-01-12 NOTE — PROGRESS NOTES
Subjective:   Patient ID:  Niesha Abarca is a 41 y.o. female who presents for cardiac consult of No chief complaint on file.    Follow-up  Associated symptoms include chest pain.     The patient came in today for cardiac consult of No chief complaint on file.    Referring Physician: Kuldip Bentley MD   Reason for initial consult: CVD Risk, HLD    Niesha Abarca is a 41 y.o. female pt with HTN, HLD, Pre-ecclamsia, insulin resistance,  obesity, FH CAD, prior smoker presents for follow up CV evaluation.    19  She had pre-ecclampsia - Oct 2018 and has had high BP since then. She was on Procardia initially and now has been on Labetolol. Has had more fatigue - will change BB to CCB. Maternal grandmother  at age 39, recently found out info as she was adopted. She was on statin in the past and felt muscle pain/fatigue and took months to feel normal. Was taking metformin before for insulin resistance - A1c normal now. Occ L side atypical pain, unsure if anxiety caused. No exertional CP.      19  ECG stress neg for ischemia but HTN up to 230s/60s. ECHO overall normal. , on Zetia will start. Was on simvastatin in the past. No CP.     20  LDL remains 200 recent lipids. Zetia started last visit but could not tolerate, she has felt SOB/fatigue and after 6 weeks had to stop taking it. Breathing has improved, Repatha ordered.   ECG - NSR with sinus arrythmia     3/226/20  She could not tolerate Simvastatin in the past, could not tolerate Zetia. Per pharmacist - Patient has only tried simvastatin and reports myalgias, but no further details. This information is insufficient per plan for Repatha coverage. Msg sent to provider to advise. Appeal window: 180 days. TTN  Occ headaches, told to check BP, no fevers/chills    20  Ordered Repatha but not approved, on pravastatin now. She has tried it for a few months has severe muscle pain and joint pain could not tolerate. Discussed will  stop and reorder Repatha.   No CP/SOB.     11/30/20  Has been on Repatha, recent LDL improved to 117 from 193. CK and CMP normal. She had cold symptoms after first shot, neg COVID test and then was ok. She has more back ache than normal. She has back pain at times. She just arrived from work, BP elevated. Had Thanksgiving recently with extra salt. Occ chest pain at times, more anxiety at times.     4/19/21  BP elevated 130s/90s, HR 95. She had dental work a few weeks ago, BP was 160/110 had her first cavity filling. She had 2nd COVID vaccine last week. She has been having more CP, in center of chest pain  ECG -NSR, poor RWP - old     7/27/21  Recent LDL worse 134 from 117. Sleep study with mild CARMEN. Added benicar last visit. BP elevated today 148/94. Bp at home has been normal. Still has atypical CP.    10/8/21  BP elevated 150/86. . She had strep throat 2 weeks ago. 134/75 at prior PCP visit. She went to GI and had started protonix. Reflux improved now. She will get mouthguard for CARMEN.    1/12/22  Lipids improved in Sept '21.  BP elevated 150s/80s. She had COVID 3 weeks ago, she feels more tired, had headache and fever. She still has stuffy nose.   ECG - NSR    Patient feels no sob, no leg swelling, no PND, no palpitation, no dizziness, no syncope, no CNS symptoms.    Patient has fairly good exercise tolerance. Is a teacher, 5th grade.     Patient is compliant with medications.    FH - early CAD - maternal GM    ECG - NSR    Sleep study  PHYSICIAN INTERPRETATION AND COMMENTS: Findings are consistent with mild, positional obstructive sleep apnea(CARMEN).  Overall AHI was 8.0/hr. SpO2 kishan was 85.4%. Therapy should be considered.  CLINICAL HISTORY: 40 year old female presented with: 16 inch neck, BMI of 40.1, an Pettibone sleepiness score of 3, history  of hypertension, diabetes and symptoms of nocturnal snoring. Based on the clinical history, the patient has a high pre-test  probability of having Moderate CARMEN.  STOPBANG score 5. Independence score 5.      EKG Conclusions:    1. The EKG portion of this study is negative for ischemia at a moderate workload, and peak heart rate of 164 bpm (94% of predicted).   2. Exercise capacity is average.   3. Blood pressure response to exercise was hypertensive (Presenting BP: 147/82 Peak BP: 234/60).   4. No significant arrhythmias were present.   5. There were no symptoms of chest discomfort or significant dyspnea throughout the protocol.   6. The Duke treadmill score was 7 suggesting a low probability for future cardiovascular events.    This document has been electronically    SIGNED BY: Bayron Rankin MD On: 2019 06:43    CONCLUSIONS     1 - No wall motion abnormalities.     2 - Normal left ventricular systolic function (EF 55-60%).     3 - Normal left ventricular diastolic function.     4 - Normal right ventricular systolic function .     5 - The estimated PA systolic pressure is 23 mmHg.       This document has been electronically    SIGNED BY: Hola eJrry MD On: 2019 15:16    Past Medical History:   Diagnosis Date    Essential hypertension 2019    Hyperlipidemia     IBS (irritable bowel syndrome)        Past Surgical History:   Procedure Laterality Date    APPENDECTOMY       SECTION      WISDOM TOOTH EXTRACTION         Social History     Tobacco Use    Smoking status: Former Smoker     Packs/day: 0.50     Years: 15.00     Pack years: 7.50     Types: Cigarettes     Quit date: 2018     Years since quitting: 3.6    Smokeless tobacco: Never Used   Substance Use Topics    Alcohol use: Not Currently    Drug use: No       Family History   Adopted: Yes   Problem Relation Age of Onset    No Known Problems Mother     No Known Problems Father        Patient's Medications   New Prescriptions    No medications on file   Previous Medications    ACYCLOVIR (ZOVIRAX) 400 MG TABLET    TK 1 T PO TID FOR 5 DAYS    BUPROPION (WELLBUTRIN XL) 150 MG TB24  TABLET    Take 1 tablet (150 mg total) by mouth once daily.    EVOLOCUMAB (REPATHA SURECLICK) 140 MG/ML PNIJ    Inject 1 mL (140 mg total) into the skin every 14 (fourteen) days.    IBUPROFEN (ADVIL,MOTRIN) 600 MG TABLET        PANTOPRAZOLE (PROTONIX) 40 MG TABLET    Take 1 tablet (40 mg total) by mouth once daily.    SOD SULF-POT CHLORIDE-MAG SULF (SUTAB) 1.479-0.188- 0.225 GRAM TABLET    Take 12 tablets by mouth once daily. Take according to package instructions with indicated amount of water.   Modified Medications    Modified Medication Previous Medication    AMLODIPINE (NORVASC) 5 MG TABLET amLODIPine (NORVASC) 5 MG tablet       Take 1 tablet (5 mg total) by mouth 2 (two) times a day.    TAKE 1 TABLET(5 MG) BY MOUTH TWICE DAILY    LOSARTAN (COZAAR) 25 MG TABLET losartan (COZAAR) 25 MG tablet       Take 1 tablet (25 mg total) by mouth once daily.    Take 1 tablet (25 mg total) by mouth once daily.   Discontinued Medications    No medications on file       Review of Systems   Constitutional: Positive for malaise/fatigue.   HENT: Negative.    Eyes: Negative.    Respiratory: Negative.    Cardiovascular: Positive for chest pain and leg swelling (rare).   Gastrointestinal: Negative.    Genitourinary: Negative.    Musculoskeletal: Negative.    Skin: Negative.    Neurological: Negative.    Endo/Heme/Allergies: Negative.    Psychiatric/Behavioral: Negative.    All 12 systems otherwise negative.      Wt Readings from Last 3 Encounters:   01/12/22 110.7 kg (244 lb)   12/26/21 111.1 kg (245 lb)   12/01/21 111.4 kg (245 lb 9.5 oz)     Temp Readings from Last 3 Encounters:   12/27/21 98.3 °F (36.8 °C) (Tympanic)   12/26/21 99.4 °F (37.4 °C) (Oral)   09/22/21 (!) 102.2 °F (39 °C)     BP Readings from Last 3 Encounters:   01/12/22 (!) 150/88   12/27/21 127/77   12/26/21 (!) 136/91     Pulse Readings from Last 3 Encounters:   01/12/22 82   12/27/21 97   12/26/21 107       BP (!) 150/88 (BP Location: Left arm, Patient Position:  "Sitting, BP Method: Large (Manual))   Pulse 82   Resp 16   Ht 5' 5" (1.651 m)   Wt 110.7 kg (244 lb)   LMP 12/13/2021 (Approximate)   SpO2 100%   BMI 40.60 kg/m²     Objective:   Physical Exam  Vitals and nursing note reviewed.   Constitutional:       General: She is not in acute distress.     Appearance: She is well-developed. She is obese. She is not diaphoretic.      Comments: Obese   HENT:      Head: Normocephalic and atraumatic.      Nose: Nose normal.   Eyes:      General: No scleral icterus.     Conjunctiva/sclera: Conjunctivae normal.   Neck:      Thyroid: No thyromegaly.      Vascular: No JVD.   Cardiovascular:      Rate and Rhythm: Normal rate and regular rhythm.      Heart sounds: S1 normal and S2 normal. No murmur heard.  No friction rub. No gallop. No S3 or S4 sounds.    Pulmonary:      Effort: Pulmonary effort is normal. No respiratory distress.      Breath sounds: Normal breath sounds. No stridor. No wheezing or rales.   Chest:      Chest wall: No tenderness.   Abdominal:      General: Bowel sounds are normal. There is no distension.      Palpations: Abdomen is soft. There is no mass.      Tenderness: There is no abdominal tenderness. There is no rebound.   Genitourinary:     Comments: Deferred  Musculoskeletal:         General: No tenderness or deformity. Normal range of motion.      Cervical back: Normal range of motion and neck supple.   Lymphadenopathy:      Cervical: No cervical adenopathy.   Skin:     General: Skin is warm and dry.      Coloration: Skin is not pale.      Findings: No erythema or rash.   Neurological:      Mental Status: She is alert and oriented to person, place, and time.      Motor: No abnormal muscle tone.      Coordination: Coordination normal.   Psychiatric:         Behavior: Behavior normal.         Thought Content: Thought content normal.         Judgment: Judgment normal.         Lab Results   Component Value Date     05/28/2021    K 4.4 05/28/2021     " 05/28/2021    CO2 27 05/28/2021    BUN 11 05/28/2021    CREATININE 0.8 05/28/2021    GLU 91 05/28/2021    HGBA1C 5.2 01/29/2019    AST 24 05/28/2021    ALT 44 05/28/2021    ALBUMIN 3.6 05/28/2021    PROT 7.4 05/28/2021    BILITOT 0.5 05/28/2021    WBC 7.22 01/29/2019    HGB 12.2 01/29/2019    HCT 39.7 01/29/2019    MCV 90 01/29/2019     (H) 01/29/2019    TSH 1.578 06/15/2017    CHOL 187 09/28/2021    HDL 41 09/28/2021    LDLCALC 126.0 09/28/2021    TRIG 100 09/28/2021     Assessment:      1. Essential hypertension    2. Obesity (BMI 30-39.9)    3. Mixed hyperlipidemia    4. Statin intolerance    5. CARMEN (obstructive sleep apnea)    6. BMI 40.0-44.9, adult    7. Other irritable bowel syndrome    8. Gastroesophageal reflux disease, unspecified whether esophagitis present    9. Sleep apnea, unspecified type    10. History of COVID-19        Plan:   1. HTN - elevated  - cont meds  - changed benicar to losartan, norvasc BID  - low salt diet   - weight loss  - if BP is more elevated but if on birth control rec increasing BP meds     2. 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      3. Insulin resistance - no longer based on recent A1c  - cont meds per PCP     4. Obesity  - need weight loss with diet/exercise   - refer to bariatric diet    5. Chest pain - atypical   - stress test - negative 2019  - 2 D ECHO - normal  - rare snoring/allergies    6. IBS  - cont tx per PCP  - has C scope pending    7. CARMEN, mild  - cont tx per pulm  - has been using Mouth guard    8. GERD - improved  - cont PPI, f/u GI  - has atypical CP  - may need EGD - low CV risk     9. H/O COVID 19, 3 weeks ago  - improving now    Thank you for allowing me to participate in this patient's care. Please do not hesitate to contact me with any questions or concerns. Consult note has been  forwarded to the referral physician.

## 2022-01-13 RX ORDER — BUPROPION HYDROCHLORIDE 150 MG/1
TABLET ORAL
Qty: 90 TABLET | Refills: 3 | Status: SHIPPED | OUTPATIENT
Start: 2022-01-13 | End: 2023-01-17

## 2022-01-31 ENCOUNTER — SPECIALTY PHARMACY (OUTPATIENT)
Dept: PHARMACY | Facility: CLINIC | Age: 42
End: 2022-01-31
Payer: COMMERCIAL

## 2022-02-03 ENCOUNTER — SPECIALTY PHARMACY (OUTPATIENT)
Dept: PHARMACY | Facility: CLINIC | Age: 42
End: 2022-02-03
Payer: COMMERCIAL

## 2022-02-03 NOTE — TELEPHONE ENCOUNTER
Specialty Pharmacy - Refill Coordination    Specialty Medication Orders Linked to Encounter    Flowsheet Row Most Recent Value   Medication #1 evolocumab (REPATHA SURECLICK) 140 mg/mL PnIj (Order#[id number], Rx#[id number])          Refill Questions - Documented Responses    Flowsheet Row Most Recent Value   Patient Availability and HIPAA Verification    Does patient want to proceed with activity? Yes   HIPAA/medical authority confirmed? Yes   Relationship to patient of person spoken to? Self   Refill Screening Questions    Would patient like to speak to a pharmacist? No   When does the patient need to receive the medication? 02/08/22   Refill Delivery Questions    How will the patient receive the medication? Delivery Lisa   When does the patient need to receive the medication? 02/08/22   Shipping Address Home   Address in [facility] confirmed and updated if neccessary? Yes   Expected Copay ($) 5   Is the patient able to afford the medication copay? Yes   Payment Method CC on file   Days supply of Refill 28   Supplies needed? No supplies needed   Refill activity completed? Yes   Refill activity plan Refill scheduled   Shipment/Pickup Date: 02/08/22          Current Outpatient Medications   Medication Sig    acyclovir (ZOVIRAX) 400 MG tablet TK 1 T PO TID FOR 5 DAYS    amLODIPine (NORVASC) 5 MG tablet Take 1 tablet (5 mg total) by mouth 2 (two) times a day.    buPROPion (WELLBUTRIN XL) 150 MG TB24 tablet TAKE 1 TABLET(150 MG) BY MOUTH EVERY DAY    evolocumab (REPATHA SURECLICK) 140 mg/mL PnIj Inject 1 mL (140 mg total) into the skin every 14 (fourteen) days.    ibuprofen (ADVIL,MOTRIN) 600 MG tablet     losartan (COZAAR) 25 MG tablet Take 1 tablet (25 mg total) by mouth once daily.    pantoprazole (PROTONIX) 40 MG tablet Take 1 tablet (40 mg total) by mouth once daily.    sod sulf-pot chloride-mag sulf (SUTAB) 1.479-0.188- 0.225 gram tablet Take 12 tablets by mouth once daily. Take according to package  instructions with indicated amount of water.   Last reviewed on 1/12/2022  4:31 PM by Otf Tirado MD    Review of patient's allergies indicates:  No Known Allergies Last reviewed on  1/12/2022 4:31 PM by Otf Tirado      Tasks added this encounter   No tasks added.   Tasks due within next 3 months   1/11/2022 - Refill Call (Auto Added)     [facility]  [address] [place], [place] [address]  [facility] [address]  Phone: [phone]  Fax: [phone]

## 2022-02-03 NOTE — TELEPHONE ENCOUNTER
Unable to reach patient after several attempts regarding Repatha refill. Closing patient enrollment if no further contact from patient within 1 week. Messaged Dr. Tirado and staff to inform.

## 2022-02-14 ENCOUNTER — PATIENT MESSAGE (OUTPATIENT)
Dept: PHYSICAL MEDICINE AND REHAB | Facility: CLINIC | Age: 42
End: 2022-02-14
Payer: COMMERCIAL

## 2022-03-17 ENCOUNTER — SPECIALTY PHARMACY (OUTPATIENT)
Dept: PHARMACY | Facility: CLINIC | Age: 42
End: 2022-03-17
Payer: COMMERCIAL

## 2022-03-24 ENCOUNTER — PATIENT MESSAGE (OUTPATIENT)
Dept: GASTROENTEROLOGY | Facility: CLINIC | Age: 42
End: 2022-03-24
Payer: COMMERCIAL

## 2022-03-29 ENCOUNTER — SPECIALTY PHARMACY (OUTPATIENT)
Dept: PHARMACY | Facility: CLINIC | Age: 42
End: 2022-03-29
Payer: COMMERCIAL

## 2022-04-08 ENCOUNTER — SPECIALTY PHARMACY (OUTPATIENT)
Dept: PHARMACY | Facility: CLINIC | Age: 42
End: 2022-04-08
Payer: COMMERCIAL

## 2022-04-08 NOTE — TELEPHONE ENCOUNTER
Specialty Pharmacy - Refill Coordination    Specialty Medication Orders Linked to Encounter    Flowsheet Row Most Recent Value   Medication #1 evolocumab (REPATHA SURECLICK) 140 mg/mL PnIj (Order#[id number], Rx#)          Refill Questions - Documented Responses    Flowsheet Row Most Recent Value   Patient Availability and HIPAA Verification    Does patient want to proceed with activity? Yes   HIPAA/medical authority confirmed? Yes   Relationship to patient of person spoken to? Self   Refill Screening Questions    Would patient like to speak to a pharmacist? No   When does the patient need to receive the medication? 04/12/22   Refill Delivery Questions    How will the patient receive the medication? Delivery Lisa   When does the patient need to receive the medication? 04/12/22   Shipping Address Home   Address in [facility] confirmed and updated if neccessary? Yes   Expected Copay ($) 5   Is the patient able to afford the medication copay? Yes   Payment Method CC on file   Days supply of Refill 28   Refill activity completed? Yes   Refill activity plan Refill scheduled   Shipment/Pickup Date: 04/12/22          Current Outpatient Medications   Medication Sig    acyclovir (ZOVIRAX) 400 MG tablet TK 1 T PO TID FOR 5 DAYS    amLODIPine (NORVASC) 5 MG tablet Take 1 tablet (5 mg total) by mouth 2 (two) times a day.    buPROPion (WELLBUTRIN XL) 150 MG TB24 tablet TAKE 1 TABLET(150 MG) BY MOUTH EVERY DAY    evolocumab (REPATHA SURECLICK) 140 mg/mL PnIj Inject 1 mL (140 mg total) into the skin every 14 (fourteen) days.    ibuprofen (ADVIL,MOTRIN) 600 MG tablet     losartan (COZAAR) 25 MG tablet Take 1 tablet (25 mg total) by mouth once daily.    pantoprazole (PROTONIX) 40 MG tablet Take 1 tablet (40 mg total) by mouth once daily.    sod sulf-pot chloride-mag sulf (SUTAB) 1.479-0.188- 0.225 gram tablet Take 12 tablets by mouth once daily. Take according to package instructions with indicated amount of water.    Last reviewed on 1/12/2022  4:31 PM by Otf Tirado MD    Review of patient's allergies indicates:  No Known Allergies Last reviewed on  1/12/2022 4:31 PM by Otf Tirado    Interventions added this encounter   Closed: [facility] Patient Intervention - Drug therapy adherence: evolocumab (REPATHA SURECLICK) 140 mg/mL PnIj     Tasks added this encounter   5/3/2022 - Refill Call (Auto Added)   Tasks due within next 3 months   No tasks due.     Madison Ramey, PharmD  [facility]  [address] [place] [id number]  Phone: [phone]  Fax: [phone]

## 2022-04-18 ENCOUNTER — PATIENT MESSAGE (OUTPATIENT)
Dept: ADMINISTRATIVE | Facility: OTHER | Age: 42
End: 2022-04-18
Payer: COMMERCIAL

## 2022-04-25 DIAGNOSIS — K62.5 RECTAL BLEEDING: Primary | ICD-10-CM

## 2022-05-03 DIAGNOSIS — I10 ESSENTIAL HYPERTENSION: ICD-10-CM

## 2022-05-03 DIAGNOSIS — E78.2 MIXED HYPERLIPIDEMIA: ICD-10-CM

## 2022-05-03 RX ORDER — EVOLOCUMAB 140 MG/ML
140 INJECTION, SOLUTION SUBCUTANEOUS
Qty: 2 ML | Refills: 2 | Status: SHIPPED | OUTPATIENT
Start: 2022-05-03 | End: 2022-07-14 | Stop reason: SDUPTHER

## 2022-05-05 ENCOUNTER — OFFICE VISIT (OUTPATIENT)
Dept: DERMATOLOGY | Facility: CLINIC | Age: 42
End: 2022-05-05
Payer: COMMERCIAL

## 2022-05-05 DIAGNOSIS — L57.0 ACTINIC KERATOSIS: ICD-10-CM

## 2022-05-05 DIAGNOSIS — L82.1 SEBORRHEIC KERATOSIS: ICD-10-CM

## 2022-05-05 DIAGNOSIS — D18.00 HEMANGIOMA, UNSPECIFIED SITE: ICD-10-CM

## 2022-05-05 DIAGNOSIS — D22.9 MULTIPLE BENIGN NEVI: ICD-10-CM

## 2022-05-05 DIAGNOSIS — Z12.83 SKIN CANCER SCREENING: Primary | ICD-10-CM

## 2022-05-05 DIAGNOSIS — L82.0 INFLAMED SEBORRHEIC KERATOSIS: ICD-10-CM

## 2022-05-05 PROCEDURE — 17110 PR DESTRUCTION BENIGN LESIONS UP TO 14: ICD-10-PCS | Mod: S$GLB,,, | Performed by: STUDENT IN AN ORGANIZED HEALTH CARE EDUCATION/TRAINING PROGRAM

## 2022-05-05 PROCEDURE — 1160F RVW MEDS BY RX/DR IN RCRD: CPT | Mod: CPTII,S$GLB,, | Performed by: STUDENT IN AN ORGANIZED HEALTH CARE EDUCATION/TRAINING PROGRAM

## 2022-05-05 PROCEDURE — 99999 PR PBB SHADOW E&M-EST. PATIENT-LVL III: CPT | Mod: PBBFAC,,, | Performed by: STUDENT IN AN ORGANIZED HEALTH CARE EDUCATION/TRAINING PROGRAM

## 2022-05-05 PROCEDURE — 99999 PR PBB SHADOW E&M-EST. PATIENT-LVL III: ICD-10-PCS | Mod: PBBFAC,,, | Performed by: STUDENT IN AN ORGANIZED HEALTH CARE EDUCATION/TRAINING PROGRAM

## 2022-05-05 PROCEDURE — 4010F PR ACE/ARB THEARPY RXD/TAKEN: ICD-10-PCS | Mod: CPTII,S$GLB,, | Performed by: STUDENT IN AN ORGANIZED HEALTH CARE EDUCATION/TRAINING PROGRAM

## 2022-05-05 PROCEDURE — 17000 DESTRUCT PREMALG LESION: CPT | Mod: 59,S$GLB,, | Performed by: STUDENT IN AN ORGANIZED HEALTH CARE EDUCATION/TRAINING PROGRAM

## 2022-05-05 PROCEDURE — 99213 PR OFFICE/OUTPT VISIT, EST, LEVL III, 20-29 MIN: ICD-10-PCS | Mod: 25,S$GLB,, | Performed by: STUDENT IN AN ORGANIZED HEALTH CARE EDUCATION/TRAINING PROGRAM

## 2022-05-05 PROCEDURE — 17000 PR DESTRUCTION(LASER SURGERY,CRYOSURGERY,CHEMOSURGERY),PREMALIGNANT LESIONS,FIRST LESION: ICD-10-PCS | Mod: 59,S$GLB,, | Performed by: STUDENT IN AN ORGANIZED HEALTH CARE EDUCATION/TRAINING PROGRAM

## 2022-05-05 PROCEDURE — 1160F PR REVIEW ALL MEDS BY PRESCRIBER/CLIN PHARMACIST DOCUMENTED: ICD-10-PCS | Mod: CPTII,S$GLB,, | Performed by: STUDENT IN AN ORGANIZED HEALTH CARE EDUCATION/TRAINING PROGRAM

## 2022-05-05 PROCEDURE — 1159F PR MEDICATION LIST DOCUMENTED IN MEDICAL RECORD: ICD-10-PCS | Mod: CPTII,S$GLB,, | Performed by: STUDENT IN AN ORGANIZED HEALTH CARE EDUCATION/TRAINING PROGRAM

## 2022-05-05 PROCEDURE — 1159F MED LIST DOCD IN RCRD: CPT | Mod: CPTII,S$GLB,, | Performed by: STUDENT IN AN ORGANIZED HEALTH CARE EDUCATION/TRAINING PROGRAM

## 2022-05-05 PROCEDURE — 99213 OFFICE O/P EST LOW 20 MIN: CPT | Mod: 25,S$GLB,, | Performed by: STUDENT IN AN ORGANIZED HEALTH CARE EDUCATION/TRAINING PROGRAM

## 2022-05-05 PROCEDURE — 17110 DESTRUCTION B9 LES UP TO 14: CPT | Mod: S$GLB,,, | Performed by: STUDENT IN AN ORGANIZED HEALTH CARE EDUCATION/TRAINING PROGRAM

## 2022-05-05 PROCEDURE — 4010F ACE/ARB THERAPY RXD/TAKEN: CPT | Mod: CPTII,S$GLB,, | Performed by: STUDENT IN AN ORGANIZED HEALTH CARE EDUCATION/TRAINING PROGRAM

## 2022-05-05 NOTE — PROGRESS NOTES
Subjective:       Patient ID:  Niesha Abarca is a 41 y.o. female who presents for   Chief Complaint   Patient presents with    Skin Check     Full body. No new abnormal moles      HPI     Denies personal hx of melanoma or NMSC. Unknown family hx of skin cancers. Patient here for skin check.    Review of Systems   Skin: Positive for daily sunscreen use. Negative for itching, rash and recent sunburn.   Hematologic/Lymphatic: Does not bruise/bleed easily.        Objective:    Physical Exam   Constitutional: She appears well-developed and well-nourished. No distress.   Neurological: She is alert and oriented to person, place, and time. She is not disoriented.   Psychiatric: She has a normal mood and affect.   Skin:   Areas Examined (abnormalities noted in diagram):   Head / Face Inspection Performed  Neck Inspection Performed  Chest / Axilla Inspection Performed  Back Inspection Performed  RUE Inspected  LUE Inspection Performed                       Diagram Legend     Erythematous scaling macule/papule c/w actinic keratosis       Vascular papule c/w angioma      Pigmented verrucoid papule/plaque c/w seborrheic keratosis      Yellow umbilicated papule c/w sebaceous hyperplasia      Irregularly shaped tan macule c/w lentigo     1-2 mm smooth white papules consistent with Milia      Movable subcutaneous cyst with punctum c/w epidermal inclusion cyst      Subcutaneous movable cyst c/w pilar cyst      Firm pink to brown papule c/w dermatofibroma      Pedunculated fleshy papule(s) c/w skin tag(s)      Evenly pigmented macule c/w junctional nevus     Mildly variegated pigmented, slightly irregular-bordered macule c/w mildly atypical nevus      Flesh colored to evenly pigmented papule c/w intradermal nevus       Pink pearly papule/plaque c/w basal cell carcinoma      Erythematous hyperkeratotic cursted plaque c/w SCC      Surgical scar with no sign of skin cancer recurrence      Open and closed comedones       Inflammatory papules and pustules      Verrucoid papule consistent consistent with wart     Erythematous eczematous patches and plaques     Dystrophic onycholytic nail with subungual debris c/w onychomycosis     Umbilicated papule    Erythematous-base heme-crusted tan verrucoid plaque consistent with inflamed seborrheic keratosis     Erythematous Silvery Scaling Plaque c/w Psoriasis     See annotation      Assessment / Plan:        Skin cancer screening  Total body skin examination performed today including at least 12 points as noted in physical examination. No lesions suspicious for malignancy noted.    Recommend daily sun protection/avoidance, use of at least SPF 30, broad spectrum sunscreen (OTC drug), skin self examinations, and routine physician surveillance to optimize early detection    Actinic keratosis  Cryosurgery Procedure Note    Verbal consent from the patient is obtained including, but not limited to, risk of hypopigmentation/hyperpigmentation, scar, recurrence of lesion. The patient is aware of the precancerous quality and need for treatment of these lesions. Liquid nitrogen cryosurgery is applied to the 1 actinic keratoses, as detailed in the physical exam, to produce a freeze injury. The patient is aware that blisters may form and is instructed on wound care with gentle cleansing and use of vaseline ointment to keep moist until healed. The patient is supplied a handout on cryosurgery and is instructed to call if lesions do not completely resolve.    Multiple benign nevi  Discussed ABCDE's of nevi.  Monitor for new mole or moles that are becoming bigger, darker, irritated, or developing irregular borders. Brochure provided. Instructed patient to observe lesion(s) for changes and follow up in clinic if changes are noted. Patient to monitor skin at home for new or changing lesions.     Hemangioma, unspecified site  This is a benign vascular lesion. Reassurance given. No treatment required.      Seborrheic keratosis  These are benign inherited growths without a malignant potential. Reassurance given to patient. No treatment is necessary.     Inflamed seborrheic keratosis  Cryosurgery procedure note:    Verbal consent from the patient is obtained including, but not limited to, risk of hypopigmentation/hyperpigmentation, scar, recurrence of lesion. Liquid nitrogen cryosurgery is applied to 1 lesions to produce a freeze injury. The patient is aware that blisters may form and is instructed on wound care with gentle cleansing and use of vaseline ointment to keep moist until healed. The patient is supplied a handout on cryosurgery and is instructed to call if lesions do not completely resolve.    LOS NUMBER AND COMPLEXITY OF PROBLEMS    COMPLEXITY OF DATA RISK TOTAL TIME (m)   62823  59511 [] 1 self-limited or minor problem [x] Minimal to none [] No treatment recommended or patient to monitor 15-29  10-19   83972  24888 Low  [] 2 or > self limited or minor problems  [] 1 stable chronic illness  [] 1 acute, uncomplicated illness or injury Limited (2)  [] Prior external notes from each unique source  [] Review result of each unique test  [] Order each unique test [x]  Low  OTC medications, minor skin biopsy 30-44  20-29   40394  53570 Moderate  []  1 or > chronic illness with progression, exacerbation or SE of treatment  [x]  2 or more stable chronic illnesses  []  1 acute illness with systemic symptoms  []  1 acute complicated injury  []  1 undiagnosed new problem with uncertain prognosis Moderate (1/3 below)  []  3 or more data items        *Now includes assessment requiring independent historian  []  Independent interpretation of a test  []  Discuss management/test with another provider Moderate  []  Prescription drug mgmt  []  Minor surgery with risk discussed  []  Mgmt limited by social determinates 45-59  30-39   91103  69246 High  []  1 or more chronic illness with severe exacerbation, progression or SE  of treatment  []  1 acute or chronic illness/injury that poses a threat to life or bodily function Extensive (2/3 below)  []  3 or more data items        *Now includes assessment requiring independent historian.  []  Independent interpretation of a test  []  Discuss management/test with another provider High  []  Major surgery with risk discussed  []  Drug therapy requiring intensive monitoring for toxicity  []  Hospitalization  []  Decision for DNR 60-74  40-54

## 2022-05-05 NOTE — PATIENT INSTRUCTIONS

## 2022-05-12 ENCOUNTER — SPECIALTY PHARMACY (OUTPATIENT)
Dept: PHARMACY | Facility: CLINIC | Age: 42
End: 2022-05-12
Payer: COMMERCIAL

## 2022-05-12 NOTE — TELEPHONE ENCOUNTER
Specialty Pharmacy - Refill Coordination    Specialty Medication Orders Linked to Encounter    Flowsheet Row Most Recent Value   Medication #1 evolocumab (REPATHA SURECLICK) 140 mg/mL PnIj (Order#[id number], Rx#[id number])          Refill Questions - Documented Responses    Flowsheet Row Most Recent Value   Patient Availability and HIPAA Verification    Does patient want to proceed with activity? Yes   HIPAA/medical authority confirmed? Yes   Relationship to patient of person spoken to? Self   Refill Screening Questions    Would patient like to speak to a pharmacist? No   When does the patient need to receive the medication? 05/24/22   Refill Delivery Questions    How will the patient receive the medication? Delivery Lisa   When does the patient need to receive the medication? 05/24/22   Shipping Address Home   Address in [facility] confirmed and updated if neccessary? Yes   Expected Copay ($) 5   Is the patient able to afford the medication copay? Yes   Payment Method CC on file   Days supply of Refill 28   Supplies needed? Alcohol Swabs   Refill activity completed? Yes   Refill activity plan Refill scheduled   Shipment/Pickup Date: 05/18/22          Current Outpatient Medications   Medication Sig    acyclovir (ZOVIRAX) 400 MG tablet TK 1 T PO TID FOR 5 DAYS    amLODIPine (NORVASC) 5 MG tablet Take 1 tablet (5 mg total) by mouth 2 (two) times a day.    buPROPion (WELLBUTRIN XL) 150 MG TB24 tablet TAKE 1 TABLET(150 MG) BY MOUTH EVERY DAY    evolocumab (REPATHA SURECLICK) 140 mg/mL PnIj Inject 1 mL (140 mg total) into the skin every 14 (fourteen) days.    ibuprofen (ADVIL,MOTRIN) 600 MG tablet     losartan (COZAAR) 25 MG tablet Take 1 tablet (25 mg total) by mouth once daily.    pantoprazole (PROTONIX) 40 MG tablet Take 1 tablet (40 mg total) by mouth once daily.    sod sulf-pot chloride-mag sulf (SUTAB) 1.479-0.188- 0.225 gram tablet Take 12 tablets by mouth once daily. Take according to package  instructions with indicated amount of water.   Last reviewed on 5/5/2022  3:48 PM by Carlee Patino MD    Review of patient's allergies indicates:  No Known Allergies Last reviewed on  5/5/2022 3:48 PM by Carlee Patino      Tasks added this encounter   6/14/2022 - Refill Call (Auto Added)   Tasks due within next 3 months   No tasks due.     Yenifer Domingo, PharmD  [facility]  [address] [place] [id number]  Phone: [phone]  Fax: [phone]

## 2022-06-14 ENCOUNTER — PATIENT MESSAGE (OUTPATIENT)
Dept: PHARMACY | Facility: CLINIC | Age: 42
End: 2022-06-14
Payer: COMMERCIAL

## 2022-06-23 ENCOUNTER — SPECIALTY PHARMACY (OUTPATIENT)
Dept: PHARMACY | Facility: CLINIC | Age: 42
End: 2022-06-23
Payer: COMMERCIAL

## 2022-06-23 NOTE — TELEPHONE ENCOUNTER
Specialty Pharmacy - Refill Coordination    Specialty Medication Orders Linked to Encounter    Flowsheet Row Most Recent Value   Medication #1 evolocumab (REPATHA SURECLICK) 140 mg/mL PnIj (Order#[id number], Rx#)          Refill Questions - Documented Responses    Flowsheet Row Most Recent Value   Patient Availability and HIPAA Verification    Does patient want to proceed with activity? Yes   HIPAA/medical authority confirmed? Yes   Relationship to patient of person spoken to? Self   Refill Screening Questions    Would patient like to speak to a pharmacist? No   When does the patient need to receive the medication? 07/01/22   Refill Delivery Questions    How will the patient receive the medication? Delivery Lisa   When does the patient need to receive the medication? 07/01/22   Shipping Address Home   Address in [facility] confirmed and updated if neccessary? Yes   Expected Copay ($) 5   Is the patient able to afford the medication copay? Yes   Payment Method CC on file   Days supply of Refill 28   Refill activity completed? Yes   Refill activity plan Refill scheduled   Shipment/Pickup Date: 06/29/22          Current Outpatient Medications   Medication Sig    acyclovir (ZOVIRAX) 400 MG tablet TK 1 T PO TID FOR 5 DAYS    amLODIPine (NORVASC) 5 MG tablet Take 1 tablet (5 mg total) by mouth 2 (two) times a day.    buPROPion (WELLBUTRIN XL) 150 MG TB24 tablet TAKE 1 TABLET(150 MG) BY MOUTH EVERY DAY    evolocumab (REPATHA SURECLICK) 140 mg/mL PnIj Inject 1 mL (140 mg total) into the skin every 14 (fourteen) days.    ibuprofen (ADVIL,MOTRIN) 600 MG tablet     losartan (COZAAR) 25 MG tablet Take 1 tablet (25 mg total) by mouth once daily.    pantoprazole (PROTONIX) 40 MG tablet Take 1 tablet (40 mg total) by mouth once daily.    sod sulf-pot chloride-mag sulf (SUTAB) 1.479-0.188- 0.225 gram tablet Take 12 tablets by mouth once daily. Take according to package instructions with indicated amount of water.    Last reviewed on 5/5/2022  3:48 PM by Carlee Patino MD    Review of patient's allergies indicates:  No Known Allergies Last reviewed on  5/5/2022 3:48 PM by Carlee Patino      Tasks added this encounter   7/22/2022 - Refill Call (Auto Added)   Tasks due within next 3 months   No tasks due.     Madisno Ramey, PharmD  [facility]  [address] [place] [id number]  Phone: [phone]  Fax: [phone]

## 2022-06-24 ENCOUNTER — HOSPITAL ENCOUNTER (OUTPATIENT)
Dept: PREADMISSION TESTING | Facility: HOSPITAL | Age: 42
Discharge: HOME | End: 2022-06-24
Attending: FAMILY MEDICINE
Payer: COMMERCIAL

## 2022-06-24 DIAGNOSIS — K62.5 RECTAL BLEEDING: Primary | ICD-10-CM

## 2022-07-13 DIAGNOSIS — I10 ESSENTIAL HYPERTENSION: Primary | ICD-10-CM

## 2022-07-14 ENCOUNTER — HOSPITAL ENCOUNTER (OUTPATIENT)
Dept: CARDIOLOGY | Facility: HOSPITAL | Age: 42
Discharge: HOME | End: 2022-07-14
Attending: INTERNAL MEDICINE
Payer: COMMERCIAL

## 2022-07-14 ENCOUNTER — OFFICE VISIT (OUTPATIENT)
Dept: CARDIOLOGY | Facility: CLINIC | Age: 42
End: 2022-07-14
Payer: COMMERCIAL

## 2022-07-14 VITALS
HEIGHT: 65 IN | BODY MASS INDEX: 40.07 KG/M2 | OXYGEN SATURATION: 97 % | SYSTOLIC BLOOD PRESSURE: 120 MMHG | DIASTOLIC BLOOD PRESSURE: 80 MMHG | HEART RATE: 93 BPM | WEIGHT: 240.5 LBS

## 2022-07-14 DIAGNOSIS — K58.8 OTHER IRRITABLE BOWEL SYNDROME: ICD-10-CM

## 2022-07-14 DIAGNOSIS — I10 ESSENTIAL HYPERTENSION: ICD-10-CM

## 2022-07-14 DIAGNOSIS — K21.9 GASTROESOPHAGEAL REFLUX DISEASE, UNSPECIFIED WHETHER ESOPHAGITIS PRESENT: ICD-10-CM

## 2022-07-14 DIAGNOSIS — R07.9 CHEST PAIN, UNSPECIFIED TYPE: ICD-10-CM

## 2022-07-14 DIAGNOSIS — E66.9 OBESITY (BMI 30-39.9): ICD-10-CM

## 2022-07-14 DIAGNOSIS — Z82.49 FAMILY HISTORY OF EARLY CAD: ICD-10-CM

## 2022-07-14 DIAGNOSIS — G47.33 OSA (OBSTRUCTIVE SLEEP APNEA): ICD-10-CM

## 2022-07-14 DIAGNOSIS — E78.2 MIXED HYPERLIPIDEMIA: ICD-10-CM

## 2022-07-14 DIAGNOSIS — I10 ESSENTIAL HYPERTENSION: Primary | ICD-10-CM

## 2022-07-14 DIAGNOSIS — Z78.9 STATIN INTOLERANCE: ICD-10-CM

## 2022-07-14 DIAGNOSIS — Z86.16 HISTORY OF COVID-19: ICD-10-CM

## 2022-07-14 PROCEDURE — 1159F MED LIST DOCD IN RCRD: CPT | Mod: CPTII,S$GLB,, | Performed by: INTERNAL MEDICINE

## 2022-07-14 PROCEDURE — 99214 OFFICE O/P EST MOD 30 MIN: CPT | Mod: S$GLB,,, | Performed by: INTERNAL MEDICINE

## 2022-07-14 PROCEDURE — 3079F PR MOST RECENT DIASTOLIC BLOOD PRESSURE 80-89 MM HG: ICD-10-PCS | Mod: CPTII,S$GLB,, | Performed by: INTERNAL MEDICINE

## 2022-07-14 PROCEDURE — 99999 PR PBB SHADOW E&M-EST. PATIENT-LVL IV: ICD-10-PCS | Mod: PBBFAC,,, | Performed by: INTERNAL MEDICINE

## 2022-07-14 PROCEDURE — 3079F DIAST BP 80-89 MM HG: CPT | Mod: CPTII,S$GLB,, | Performed by: INTERNAL MEDICINE

## 2022-07-14 PROCEDURE — 1159F PR MEDICATION LIST DOCUMENTED IN MEDICAL RECORD: ICD-10-PCS | Mod: CPTII,S$GLB,, | Performed by: INTERNAL MEDICINE

## 2022-07-14 PROCEDURE — 3074F SYST BP LT 130 MM HG: CPT | Mod: CPTII,S$GLB,, | Performed by: INTERNAL MEDICINE

## 2022-07-14 PROCEDURE — 1160F PR REVIEW ALL MEDS BY PRESCRIBER/CLIN PHARMACIST DOCUMENTED: ICD-10-PCS | Mod: CPTII,S$GLB,, | Performed by: INTERNAL MEDICINE

## 2022-07-14 PROCEDURE — 93010 EKG 12-LEAD: ICD-10-PCS | Mod: ,,, | Performed by: INTERNAL MEDICINE

## 2022-07-14 PROCEDURE — 99999 PR PBB SHADOW E&M-EST. PATIENT-LVL IV: CPT | Mod: PBBFAC,,, | Performed by: INTERNAL MEDICINE

## 2022-07-14 PROCEDURE — 4010F ACE/ARB THERAPY RXD/TAKEN: CPT | Mod: CPTII,S$GLB,, | Performed by: INTERNAL MEDICINE

## 2022-07-14 PROCEDURE — 99214 PR OFFICE/OUTPT VISIT, EST, LEVL IV, 30-39 MIN: ICD-10-PCS | Mod: S$GLB,,, | Performed by: INTERNAL MEDICINE

## 2022-07-14 PROCEDURE — 1160F RVW MEDS BY RX/DR IN RCRD: CPT | Mod: CPTII,S$GLB,, | Performed by: INTERNAL MEDICINE

## 2022-07-14 PROCEDURE — 4010F PR ACE/ARB THEARPY RXD/TAKEN: ICD-10-PCS | Mod: CPTII,S$GLB,, | Performed by: INTERNAL MEDICINE

## 2022-07-14 PROCEDURE — 3008F PR BODY MASS INDEX (BMI) DOCUMENTED: ICD-10-PCS | Mod: CPTII,S$GLB,, | Performed by: INTERNAL MEDICINE

## 2022-07-14 PROCEDURE — 3008F BODY MASS INDEX DOCD: CPT | Mod: CPTII,S$GLB,, | Performed by: INTERNAL MEDICINE

## 2022-07-14 PROCEDURE — 93005 ELECTROCARDIOGRAM TRACING: CPT | Mod: PO

## 2022-07-14 PROCEDURE — 3074F PR MOST RECENT SYSTOLIC BLOOD PRESSURE < 130 MM HG: ICD-10-PCS | Mod: CPTII,S$GLB,, | Performed by: INTERNAL MEDICINE

## 2022-07-14 PROCEDURE — 93010 ELECTROCARDIOGRAM REPORT: CPT | Mod: ,,, | Performed by: INTERNAL MEDICINE

## 2022-07-14 RX ORDER — AMLODIPINE BESYLATE 5 MG/1
5 TABLET ORAL 2 TIMES DAILY
Qty: 180 TABLET | Refills: 2 | Status: SHIPPED | OUTPATIENT
Start: 2022-07-14 | End: 2023-06-21 | Stop reason: SDUPTHER

## 2022-07-14 RX ORDER — LOSARTAN POTASSIUM 25 MG/1
25 TABLET ORAL DAILY
Qty: 90 TABLET | Refills: 3 | Status: SHIPPED | OUTPATIENT
Start: 2022-07-14 | End: 2022-10-04 | Stop reason: SDUPTHER

## 2022-07-14 RX ORDER — EVOLOCUMAB 140 MG/ML
140 INJECTION, SOLUTION SUBCUTANEOUS
Qty: 2 ML | Refills: 2
Start: 2022-07-14 | End: 2022-08-11

## 2022-07-14 NOTE — PROGRESS NOTES
Subjective:   Patient ID:  Niesha Abarca is a 41 y.o. female who presents for cardiac consult of No chief complaint on file.    Follow-up  Associated symptoms include chest pain.     The patient came in today for cardiac consult of No chief complaint on file.    Referring Physician: Kuldip Bentley MD   Reason for initial consult: CVD Risk, HLD    Niesha Abarca is a 41 y.o. female pt with HTN, HLD, Pre-ecclamsia, insulin resistance,  obesity, FH CAD, prior smoker presents for follow up CV evaluation.    19  She had pre-ecclampsia - Oct 2018 and has had high BP since then. She was on Procardia initially and now has been on Labetolol. Has had more fatigue - will change BB to CCB. Maternal grandmother  at age 39, recently found out info as she was adopted. She was on statin in the past and felt muscle pain/fatigue and took months to feel normal. Was taking metformin before for insulin resistance - A1c normal now. Occ L side atypical pain, unsure if anxiety caused. No exertional CP.      19  ECG stress neg for ischemia but HTN up to 230s/60s. ECHO overall normal. , on Zetia will start. Was on simvastatin in the past. No CP.     20  LDL remains 200 recent lipids. Zetia started last visit but could not tolerate, she has felt SOB/fatigue and after 6 weeks had to stop taking it. Breathing has improved, Repatha ordered.   ECG - NSR with sinus arrythmia     3/226/20  She could not tolerate Simvastatin in the past, could not tolerate Zetia. Per pharmacist - Patient has only tried simvastatin and reports myalgias, but no further details. This information is insufficient per plan for Repatha coverage. Msg sent to provider to advise. Appeal window: 180 days. TTN  Occ headaches, told to check BP, no fevers/chills    20  Ordered Repatha but not approved, on pravastatin now. She has tried it for a few months has severe muscle pain and joint pain could not tolerate. Discussed will  stop and reorder Repatha.   No CP/SOB.     11/30/20  Has been on Repatha, recent LDL improved to 117 from 193. CK and CMP normal. She had cold symptoms after first shot, neg COVID test and then was ok. She has more back ache than normal. She has back pain at times. She just arrived from work, BP elevated. Had Thanksgiving recently with extra salt. Occ chest pain at times, more anxiety at times.     4/19/21  BP elevated 130s/90s, HR 95. She had dental work a few weeks ago, BP was 160/110 had her first cavity filling. She had 2nd COVID vaccine last week. She has been having more CP, in center of chest pain  ECG -NSR, poor RWP - old     7/27/21  Recent LDL worse 134 from 117. Sleep study with mild CARMEN. Added benicar last visit. BP elevated today 148/94. Bp at home has been normal. Still has atypical CP.    10/8/21  BP elevated 150/86. . She had strep throat 2 weeks ago. 134/75 at prior PCP visit. She went to GI and had started protonix. Reflux improved now. She will get mouthguard for CARMEN.    1/12/22  Lipids improved in Sept '21.  BP elevated 150s/80s. She had COVID 3 weeks ago, she feels more tired, had headache and fever. She still has stuffy nose.   ECG - NSR    7/14/22  BP and HR well controlled. BMI 40 - 240 lbs.   ECG - NSR, LVH    Patient feels no sob, no leg swelling, no PND, no palpitation, no dizziness, no syncope, no CNS symptoms.    Patient has fairly good exercise tolerance. Is a teacher, 5th grade.     Patient is compliant with medications.    FH - early CAD - maternal GM      Sleep study  PHYSICIAN INTERPRETATION AND COMMENTS: Findings are consistent with mild, positional obstructive sleep apnea(CARMEN).  Overall AHI was 8.0/hr. SpO2 kishan was 85.4%. Therapy should be considered.  CLINICAL HISTORY: 40 year old female presented with: 16 inch neck, BMI of 40.1, an [place] sleepiness score of 3, history  of hypertension, diabetes and symptoms of nocturnal snoring. Based on the clinical history, the  patient has a high pre-test  probability of having Moderate CARMEN. STOPBANG score 5. [place] score 5.      EKG Conclusions:    1. The EKG portion of this study is negative for ischemia at a moderate workload, and peak heart rate of 164 bpm (94% of predicted).   2. Exercise capacity is average.   3. Blood pressure response to exercise was hypertensive (Presenting BP: 147/82 Peak BP: 234/60).   4. No significant arrhythmias were present.   5. There were no symptoms of chest discomfort or significant dyspnea throughout the protocol.   6. The Duke treadmill score was 7 suggesting a low probability for future cardiovascular events.    This document has been electronically    SIGNED BY: Bayron Rankin MD On: 2019 06:43    CONCLUSIONS     1 - No wall motion abnormalities.     2 - Normal left ventricular systolic function (EF 55-60%).     3 - Normal left ventricular diastolic function.     4 - Normal right ventricular systolic function .     5 - The estimated PA systolic pressure is 23 mmHg.       This document has been electronically    SIGNED BY: Hola Jerry MD On: 2019 15:16    Past Medical History:   Diagnosis Date    Essential hypertension 2019    Hyperlipidemia     IBS (irritable bowel syndrome)        Past Surgical History:   Procedure Laterality Date    APPENDECTOMY       SECTION      WISDOM TOOTH EXTRACTION         Social History     Tobacco Use    Smoking status: Former Smoker     Packs/day: 0.50     Years: 15.00     Pack years: 7.50     Types: Cigarettes     Quit date: 2018     Years since quittin.1    Smokeless tobacco: Never Used   Substance Use Topics    Alcohol use: Not Currently    Drug use: No       Family History   Adopted: Yes   Problem Relation Age of Onset    No Known Problems Mother     No Known Problems Father        Patient's Medications   New Prescriptions    No medications on file   Previous Medications    ACYCLOVIR (ZOVIRAX) 400 MG TABLET    TK  "1 T PO TID FOR 5 DAYS    AMLODIPINE (NORVASC) 5 MG TABLET    Take 1 tablet (5 mg total) by mouth 2 (two) times a day.    BUPROPION (WELLBUTRIN XL) 150 MG TB24 TABLET    TAKE 1 TABLET(150 MG) BY MOUTH EVERY DAY    EVOLOCUMAB (REPATHA SURECLICK) 140 MG/ML PNIJ    Inject 1 mL (140 mg total) into the skin every 14 (fourteen) days.    IBUPROFEN (ADVIL,MOTRIN) 600 MG TABLET        LOSARTAN (COZAAR) 25 MG TABLET    Take 1 tablet (25 mg total) by mouth once daily.    PANTOPRAZOLE (PROTONIX) 40 MG TABLET    Take 1 tablet (40 mg total) by mouth once daily.    SOD SULF-POT CHLORIDE-MAG SULF (SUTAB) 1.479-0.188- 0.225 GRAM TABLET    Take 12 tablets by mouth once daily. Take according to package instructions with indicated amount of water.   Modified Medications    No medications on file   Discontinued Medications    No medications on file       Review of Systems   Constitutional: Positive for malaise/fatigue.   HENT: Negative.    Eyes: Negative.    Respiratory: Negative.    Cardiovascular: Positive for chest pain and leg swelling (rare).   Gastrointestinal: Negative.    Genitourinary: Negative.    Musculoskeletal: Negative.    Skin: Negative.    Neurological: Negative.    Endo/Heme/Allergies: Negative.    Psychiatric/Behavioral: Negative.    All 12 systems otherwise negative.      Wt Readings from Last 3 Encounters:   07/14/22 109.1 kg (240 lb 8.4 oz)   01/12/22 110.7 kg (244 lb)   12/26/21 111.1 kg (245 lb)     Temp Readings from Last 3 Encounters:   12/27/21 98.3 °F (36.8 °C) (Tympanic)   12/26/21 99.4 °F (37.4 °C) (Oral)   09/22/21 (!) 102.2 °F (39 °C)     BP Readings from Last 3 Encounters:   07/14/22 120/80   01/12/22 (!) 150/88   12/27/21 127/77     Pulse Readings from Last 3 Encounters:   07/14/22 93   01/12/22 82   12/27/21 97       /80   Pulse 93   Ht 5' 5" (1.651 m)   Wt 109.1 kg (240 lb 8.4 oz)   SpO2 97%   BMI 40.02 kg/m²     Objective:   Physical Exam  Vitals and nursing note reviewed.   Constitutional:  "      General: She is not in acute distress.     Appearance: She is well-developed. She is obese. She is not diaphoretic.      Comments: Obese   HENT:      Head: Normocephalic and atraumatic.      Nose: Nose normal.   Eyes:      General: No scleral icterus.     Conjunctiva/sclera: Conjunctivae normal.   Neck:      Thyroid: No thyromegaly.      Vascular: No JVD.   Cardiovascular:      Rate and Rhythm: Normal rate and regular rhythm.      Heart sounds: S1 normal and S2 normal. No murmur heard.    No friction rub. No gallop. No S3 or S4 sounds.   Pulmonary:      Effort: Pulmonary effort is normal. No respiratory distress.      Breath sounds: Normal breath sounds. No stridor. No wheezing or rales.   Chest:      Chest wall: No tenderness.   Abdominal:      General: Bowel sounds are normal. There is no distension.      Palpations: Abdomen is soft. There is no mass.      Tenderness: There is no abdominal tenderness. There is no rebound.   Genitourinary:     Comments: Deferred  Musculoskeletal:         General: No tenderness or deformity. Normal range of motion.      Cervical back: Normal range of motion and neck supple.   Lymphadenopathy:      Cervical: No cervical adenopathy.   Skin:     General: Skin is warm and dry.      Coloration: Skin is not pale.      Findings: No erythema or rash.   Neurological:      Mental Status: She is alert and oriented to person, place, and time.      Motor: No abnormal muscle tone.      Coordination: Coordination normal.   Psychiatric:         Behavior: Behavior normal.         Thought Content: Thought content normal.         Judgment: Judgment normal.         Lab Results   Component Value Date     05/28/2021    K 4.4 05/28/2021     05/28/2021    CO2 27 05/28/2021    BUN 11 05/28/2021    CREATININE 0.8 05/28/2021    GLU 91 05/28/2021    HGBA1C 5.2 01/29/2019    AST 24 05/28/2021    ALT 44 05/28/2021    ALBUMIN 3.6 05/28/2021    PROT 7.4 05/28/2021    BILITOT 0.5 05/28/2021    WBC  7.22 01/29/2019    HGB 12.2 01/29/2019    HCT 39.7 01/29/2019    MCV 90 01/29/2019     (H) 01/29/2019    TSH 1.578 06/15/2017    CHOL 187 09/28/2021    HDL 41 09/28/2021    LDLCALC 126.0 09/28/2021    TRIG 100 09/28/2021     Assessment:      1. Essential hypertension    2. Obesity (BMI 30-39.9)    3. Mixed hyperlipidemia    4. Statin intolerance    5. CARMEN (obstructive sleep apnea)    6. Other irritable bowel syndrome    7. Gastroesophageal reflux disease, unspecified whether esophagitis present    8. History of COVID-19    9. Family history of early CAD    10. Chest pain, unspecified type        Plan:   1. HTN - stable  - cont meds  - changed benicar to losartan, norvasc BID  - low salt diet   - weight loss  - if BP is more elevated but if on birth control rec increasing BP meds     2. 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      3. Insulin resistance - no longer based on recent A1c  - cont meds per PCP     4. Obesity  - need weight loss with diet/exercise   - refer to bariatric diet    5. Chest pain - atypical   - stress test - negative 2019  - 2 D ECHO - normal  - rare snoring/allergies     6. IBS  - cont tx per PCP  - has C scope pending - low CV risk     7. CARMEN, mild  - cont tx per pulm  - has been using Mouth guard    8. GERD - improved  - cont PPI, f/u GI  - has atypical CP  - may need EGD - low CV risk     9. H/O COVID 19  - stable    Thank you for allowing me to participate in this patient's care. Please do not hesitate to contact me with any questions or concerns. Consult note has been forwarded to the referral physician.

## 2022-07-20 ENCOUNTER — OFFICE VISIT (OUTPATIENT)
Dept: INTERNAL MEDICINE | Facility: CLINIC | Age: 42
End: 2022-07-20
Payer: COMMERCIAL

## 2022-07-20 VITALS
WEIGHT: 241.19 LBS | HEART RATE: 76 BPM | TEMPERATURE: 98 F | BODY MASS INDEX: 40.13 KG/M2 | SYSTOLIC BLOOD PRESSURE: 130 MMHG | DIASTOLIC BLOOD PRESSURE: 80 MMHG

## 2022-07-20 DIAGNOSIS — Z00.00 ANNUAL PHYSICAL EXAM: Primary | ICD-10-CM

## 2022-07-20 PROCEDURE — 3079F PR MOST RECENT DIASTOLIC BLOOD PRESSURE 80-89 MM HG: ICD-10-PCS | Mod: CPTII,S$GLB,, | Performed by: FAMILY MEDICINE

## 2022-07-20 PROCEDURE — 1160F PR REVIEW ALL MEDS BY PRESCRIBER/CLIN PHARMACIST DOCUMENTED: ICD-10-PCS | Mod: CPTII,S$GLB,, | Performed by: FAMILY MEDICINE

## 2022-07-20 PROCEDURE — 1159F MED LIST DOCD IN RCRD: CPT | Mod: CPTII,S$GLB,, | Performed by: FAMILY MEDICINE

## 2022-07-20 PROCEDURE — 99395 PR PREVENTIVE VISIT,EST,18-39: ICD-10-PCS | Mod: S$GLB,,, | Performed by: FAMILY MEDICINE

## 2022-07-20 PROCEDURE — 99395 PREV VISIT EST AGE 18-39: CPT | Mod: S$GLB,,, | Performed by: FAMILY MEDICINE

## 2022-07-20 PROCEDURE — 3079F DIAST BP 80-89 MM HG: CPT | Mod: CPTII,S$GLB,, | Performed by: FAMILY MEDICINE

## 2022-07-20 PROCEDURE — 3008F BODY MASS INDEX DOCD: CPT | Mod: CPTII,S$GLB,, | Performed by: FAMILY MEDICINE

## 2022-07-20 PROCEDURE — 99999 PR PBB SHADOW E&M-EST. PATIENT-LVL IV: CPT | Mod: PBBFAC,,, | Performed by: FAMILY MEDICINE

## 2022-07-20 PROCEDURE — 3075F PR MOST RECENT SYSTOLIC BLOOD PRESS GE 130-139MM HG: ICD-10-PCS | Mod: CPTII,S$GLB,, | Performed by: FAMILY MEDICINE

## 2022-07-20 PROCEDURE — 99999 PR PBB SHADOW E&M-EST. PATIENT-LVL IV: ICD-10-PCS | Mod: PBBFAC,,, | Performed by: FAMILY MEDICINE

## 2022-07-20 PROCEDURE — 4010F PR ACE/ARB THEARPY RXD/TAKEN: ICD-10-PCS | Mod: CPTII,S$GLB,, | Performed by: FAMILY MEDICINE

## 2022-07-20 PROCEDURE — 3008F PR BODY MASS INDEX (BMI) DOCUMENTED: ICD-10-PCS | Mod: CPTII,S$GLB,, | Performed by: FAMILY MEDICINE

## 2022-07-20 PROCEDURE — 3075F SYST BP GE 130 - 139MM HG: CPT | Mod: CPTII,S$GLB,, | Performed by: FAMILY MEDICINE

## 2022-07-20 PROCEDURE — 1160F RVW MEDS BY RX/DR IN RCRD: CPT | Mod: CPTII,S$GLB,, | Performed by: FAMILY MEDICINE

## 2022-07-20 PROCEDURE — 4010F ACE/ARB THERAPY RXD/TAKEN: CPT | Mod: CPTII,S$GLB,, | Performed by: FAMILY MEDICINE

## 2022-07-20 PROCEDURE — 1159F PR MEDICATION LIST DOCUMENTED IN MEDICAL RECORD: ICD-10-PCS | Mod: CPTII,S$GLB,, | Performed by: FAMILY MEDICINE

## 2022-07-20 NOTE — PROGRESS NOTES
Subjective:      Patient ID: Niesha Abarca is a 41 y.o. female.    Chief Complaint: Annual Exam    HPI 41 y.o.   female patient with a PMHx of essential HTN, HLD, and IBS presents to clinic for annual exam. The patient was last seen on 2020      Today she reports she is doing well. Patient reports she seen Dr. Tirado,cardiology, for chest pains. She states she was diagnosed with esophageal spasms, it have been corrected with the help of PPI.  Saw Dr. Yoo.  Having cscope done next week  Mood stable//but concerned about increased anxiety with school  Patient otherwise without complaints. Denies SOB, chest pain, and bowel changes.     Past Medical History:   Diagnosis Date    Essential hypertension 2019    Hyperlipidemia     IBS (irritable bowel syndrome)      Family History   Adopted: Yes   Problem Relation Age of Onset    No Known Problems Mother     No Known Problems Father      Past Surgical History:   Procedure Laterality Date    APPENDECTOMY       SECTION      WISDOM TOOTH EXTRACTION       Social History     Tobacco Use    Smoking status: Former Smoker     Packs/day: 0.50     Years: 15.00     Pack years: 7.50     Types: Cigarettes     Quit date: 2018     Years since quittin.2    Smokeless tobacco: Never Used   Substance Use Topics    Alcohol use: Not Currently    Drug use: No       /80   Pulse 76   Temp 97.7 °F (36.5 °C)   Wt 109.4 kg (241 lb 2.9 oz)   LMP 2022 (Exact Date)   BMI 40.13 kg/m²     Review of Systems   Constitutional: Negative for activity change, appetite change, chills, diaphoresis, fatigue, fever and unexpected weight change.   HENT: Negative for congestion, ear pain, hearing loss, postnasal drip, rhinorrhea, sinus pain, sore throat and trouble swallowing.    Eyes: Negative for pain, discharge, itching and visual disturbance.   Respiratory: Negative for cough, chest tightness, shortness of breath and wheezing.     Cardiovascular: Negative for chest pain, palpitations and leg swelling.   Gastrointestinal: Negative for abdominal pain, blood in stool, constipation, diarrhea, nausea and vomiting.   Endocrine: Negative for polydipsia and polyuria.   Genitourinary: Negative for difficulty urinating, dysuria, flank pain, frequency, hematuria, menstrual problem, pelvic pain and urgency.   Musculoskeletal: Negative for arthralgias, back pain, joint swelling, myalgias and neck pain.   Skin: Negative for color change and rash.   Neurological: Negative for dizziness, weakness, light-headedness and headaches.   Hematological: Negative for adenopathy.   Psychiatric/Behavioral: Negative for confusion, decreased concentration and dysphoric mood.       Objective:     Physical Exam  Vitals and nursing note reviewed.   Constitutional:       General: She is not in acute distress.  HENT:      Right Ear: External ear normal.      Left Ear: External ear normal.      Nose: Nose normal.   Eyes:      Conjunctiva/sclera: Conjunctivae normal.      Pupils: Pupils are equal, round, and reactive to light.   Neck:      Vascular: No carotid bruit.   Cardiovascular:      Rate and Rhythm: Normal rate and regular rhythm.      Heart sounds: Normal heart sounds.   Pulmonary:      Effort: Pulmonary effort is normal. No respiratory distress.      Breath sounds: Normal breath sounds. No wheezing or rales.   Abdominal:      General: Bowel sounds are normal. There is no distension.      Palpations: Abdomen is soft.      Tenderness: There is no abdominal tenderness. There is no guarding.   Musculoskeletal:      Cervical back: Normal range of motion and neck supple.      Right lower leg: No edema.      Left lower leg: No edema.   Skin:     General: Skin is warm and dry.      Findings: No rash.   Neurological:      Mental Status: She is alert and oriented to person, place, and time.   Psychiatric:         Behavior: Behavior normal.         Thought Content: Thought  content normal.         Judgment: Judgment normal.         Lab Results   Component Value Date    WBC 7.22 01/29/2019    HGB 12.2 01/29/2019    HCT 39.7 01/29/2019     (H) 01/29/2019    CHOL 187 09/28/2021    TRIG 100 09/28/2021    HDL 41 09/28/2021    ALT 44 05/28/2021    AST 24 05/28/2021     05/28/2021    K 4.4 05/28/2021     05/28/2021    CREATININE 0.8 05/28/2021    BUN 11 05/28/2021    CO2 27 05/28/2021    TSH 1.578 06/15/2017    HGBA1C 5.2 01/29/2019       Assessment:     1. Annual physical exam         Plan:     Annual physical exam  -     CBC Auto Differential; Future; Expected date: 07/26/2022  -     Comprehensive Metabolic Panel; Future; Expected date: 07/26/2022  -     Hemoglobin A1C; Future; Expected date: 07/26/2022  -     Lipid Panel; Future; Expected date: 07/26/2022  -     TSH; Future; Expected date: 07/26/2022  -     INSULIN, RANDOM; Future; Expected date: 07/26/2022  -     HIV 1/2 Ag/Ab (4th Gen); Future; Expected date: 07/26/2022  -     Hepatitis C Antibody; Future; Expected date: 07/26/2022        Vitals reviewed. BP within normal range at 130/80.    Patient will return for fasting labs.  Patient overall doing well.  Questions and concerns addressed.  Ok for pt to send message once school starts//if needed we can increase wellbutrin        Documentation entered by Aubrie Mclaughlin, acting as scribe for Dr. Kuldip Fontanez. 07/20/2022 8:36 AM.

## 2022-07-22 ENCOUNTER — SPECIALTY PHARMACY (OUTPATIENT)
Dept: PHARMACY | Facility: CLINIC | Age: 42
End: 2022-07-22
Payer: COMMERCIAL

## 2022-07-22 DIAGNOSIS — E78.2 MIXED HYPERLIPIDEMIA: ICD-10-CM

## 2022-07-22 DIAGNOSIS — I10 ESSENTIAL HYPERTENSION: ICD-10-CM

## 2022-07-22 RX ORDER — EVOLOCUMAB 140 MG/ML
140 INJECTION, SOLUTION SUBCUTANEOUS
Qty: 2 ML | Refills: 6 | Status: SHIPPED | OUTPATIENT
Start: 2022-07-22 | End: 2023-07-24

## 2022-07-25 ENCOUNTER — ANESTHESIA EVENT (OUTPATIENT)
Dept: ENDOSCOPY | Facility: HOSPITAL | Age: 42
End: 2022-07-25
Payer: COMMERCIAL

## 2022-07-25 NOTE — ANESTHESIA PREPROCEDURE EVALUATION
2022  Niesha Abarca is a 41 y.o., female.  Past Surgical History:   Procedure Laterality Date    APPENDECTOMY       SECTION      WISDOM TOOTH EXTRACTION       Past Medical History:   Diagnosis Date    Essential hypertension 2019    Hyperlipidemia     IBS (irritable bowel syndrome)       2D ECHO 2019  CONCLUSIONS     1 - No wall motion abnormalities.     2 - Normal left ventricular systolic function (EF 55-60%).     3 - Normal left ventricular diastolic function.     4 - Normal right ventricular systolic function .     5 - The estimated PA systolic pressure is 23 mmHg.     EKG 22  Normal sinus rhythm   Possible Anterior infarct (cited on or before 2021)   Abnormal ECG   When compared with ECG of 2022 16:14,   No significant change was found       Pre-op Assessment    I have reviewed the Patient Summary Reports.     I have reviewed the Nursing Notes. I have reviewed the NPO Status.   I have reviewed the Medications.     Review of Systems  Anesthesia Hx:  No problems with previous Anesthesia  Denies Family Hx of Anesthesia complications.   Denies Personal Hx of Anesthesia complications.   Social:  Former Smoker, No Alcohol Use    Hematology/Oncology:  Hematology Normal   Oncology Normal     EENT/Dental:EENT/Dental Normal   Cardiovascular:   Exercise tolerance: good Hypertension hyperlipidemia    Pulmonary:   Sleep Apnea    Renal/:  Renal/ Normal     Hepatic/GI:  Hepatic/GI Normal Bowel Prep.    Musculoskeletal:  Musculoskeletal Normal    Neurological:  Neurology Normal    Endocrine:  Endocrine Normal  Morbid Obesity / BMI > 40  Dermatological:  Skin Normal    Psych:  Psychiatric Normal           Physical Exam  General: Well nourished, Cooperative, Alert and Oriented    Airway:  Mallampati: II / II  Mouth Opening: Normal  TM Distance: Normal  Tongue:  Normal  Neck ROM: Normal ROM    Dental:  Intact    Chest/Lungs:  Clear to auscultation, Normal Respiratory Rate    Heart:  Rate: Normal  Rhythm: Regular Rhythm  Sounds: Normal    Abdomen:  Normal, Soft, Nontender        Anesthesia Plan  Type of Anesthesia, risks & benefits discussed:    Anesthesia Type: Gen Natural Airway  Intra-op Monitoring Plan: Standard ASA Monitors  Post Op Pain Control Plan: multimodal analgesia  Induction:  IV  Informed Consent: Informed consent signed with the Patient and all parties understand the risks and agree with anesthesia plan.  All questions answered. Patient consented to blood products? No  ASA Score: 2  Day of Surgery Review of History & Physical: H&P Update referred to the surgeon/provider.    Ready For Surgery From Anesthesia Perspective.     .

## 2022-07-26 ENCOUNTER — PATIENT MESSAGE (OUTPATIENT)
Dept: PHARMACY | Facility: CLINIC | Age: 42
End: 2022-07-26
Payer: COMMERCIAL

## 2022-07-26 ENCOUNTER — HOSPITAL ENCOUNTER (OUTPATIENT)
Facility: HOSPITAL | Age: 42
Discharge: HOME | End: 2022-07-26
Attending: INTERNAL MEDICINE | Admitting: INTERNAL MEDICINE
Payer: COMMERCIAL

## 2022-07-26 ENCOUNTER — ANESTHESIA (OUTPATIENT)
Dept: ENDOSCOPY | Facility: HOSPITAL | Age: 42
End: 2022-07-26
Payer: COMMERCIAL

## 2022-07-26 VITALS
HEIGHT: 65 IN | SYSTOLIC BLOOD PRESSURE: 110 MMHG | HEART RATE: 73 BPM | OXYGEN SATURATION: 97 % | WEIGHT: 240.75 LBS | TEMPERATURE: 98 F | BODY MASS INDEX: 40.11 KG/M2 | RESPIRATION RATE: 18 BRPM | DIASTOLIC BLOOD PRESSURE: 67 MMHG

## 2022-07-26 DIAGNOSIS — K62.5 RECTAL BLEEDING: Primary | ICD-10-CM

## 2022-07-26 LAB
B-HCG UR QL: NEGATIVE
CTP QC/QA: YES

## 2022-07-26 PROCEDURE — 45378 DIAGNOSTIC COLONOSCOPY: CPT | Mod: ,,, | Performed by: INTERNAL MEDICINE

## 2022-07-26 PROCEDURE — 81025 URINE PREGNANCY TEST: CPT | Performed by: INTERNAL MEDICINE

## 2022-07-26 PROCEDURE — D9220A PRA ANESTHESIA: Mod: ANES,,, | Performed by: ANESTHESIOLOGY

## 2022-07-26 PROCEDURE — D9220A PRA ANESTHESIA: Mod: CRNA,,, | Performed by: NURSE ANESTHETIST, CERTIFIED REGISTERED

## 2022-07-26 PROCEDURE — 37000009 HC ANESTHESIA EA ADD 15 MINS: Performed by: INTERNAL MEDICINE

## 2022-07-26 PROCEDURE — 45378 DIAGNOSTIC COLONOSCOPY: CPT | Performed by: INTERNAL MEDICINE

## 2022-07-26 PROCEDURE — 45378 PR COLONOSCOPY,DIAGNOSTIC: ICD-10-PCS | Mod: ,,, | Performed by: INTERNAL MEDICINE

## 2022-07-26 PROCEDURE — 25000003 PHARM REV CODE 250: Performed by: NURSE ANESTHETIST, CERTIFIED REGISTERED

## 2022-07-26 PROCEDURE — D9220A PRA ANESTHESIA: ICD-10-PCS | Mod: ANES,,, | Performed by: ANESTHESIOLOGY

## 2022-07-26 PROCEDURE — D9220A PRA ANESTHESIA: ICD-10-PCS | Mod: CRNA,,, | Performed by: NURSE ANESTHETIST, CERTIFIED REGISTERED

## 2022-07-26 PROCEDURE — 63600175 PHARM REV CODE 636 W HCPCS: Performed by: INTERNAL MEDICINE

## 2022-07-26 PROCEDURE — 37000008 HC ANESTHESIA 1ST 15 MINUTES: Performed by: INTERNAL MEDICINE

## 2022-07-26 PROCEDURE — 63600175 PHARM REV CODE 636 W HCPCS: Performed by: NURSE ANESTHETIST, CERTIFIED REGISTERED

## 2022-07-26 RX ORDER — SODIUM CHLORIDE, SODIUM LACTATE, POTASSIUM CHLORIDE, CALCIUM CHLORIDE 600; 310; 30; 20 MG/100ML; MG/100ML; MG/100ML; MG/100ML
INJECTION, SOLUTION INTRAVENOUS CONTINUOUS
Status: DISCONTINUED | OUTPATIENT
Start: 2022-07-26 | End: 2022-07-26 | Stop reason: HOSPADM

## 2022-07-26 RX ORDER — PROPOFOL 10 MG/ML
VIAL (ML) INTRAVENOUS
Status: DISCONTINUED | OUTPATIENT
Start: 2022-07-26 | End: 2022-07-26

## 2022-07-26 RX ORDER — LIDOCAINE HYDROCHLORIDE 20 MG/ML
INJECTION INTRAVENOUS
Status: DISCONTINUED | OUTPATIENT
Start: 2022-07-26 | End: 2022-07-26

## 2022-07-26 RX ADMIN — PROPOFOL 50 MG: 10 INJECTION, EMULSION INTRAVENOUS at 12:07

## 2022-07-26 RX ADMIN — Medication 50 MG: at 12:07

## 2022-07-26 RX ADMIN — PROPOFOL 100 MG: 10 INJECTION, EMULSION INTRAVENOUS at 12:07

## 2022-07-26 RX ADMIN — SODIUM CHLORIDE, SODIUM LACTATE, POTASSIUM CHLORIDE, AND CALCIUM CHLORIDE: .6; .31; .03; .02 INJECTION, SOLUTION INTRAVENOUS at 12:07

## 2022-07-26 NOTE — H&P
PRE PROCEDURE H&P    Patient Name: Niesha Abarca  MRN: [id number]  : 1980  Date of Procedure:  2022  Referring Physician: Tosin Yoo MD  Primary Physician: Kuldip Bentley MD  Procedure Physician: Tosin Yoo MD       Planned Procedure: Colonoscopy  Diagnosis: rectal bleeding   Chief Complaint: Same as above    HPI: Patient is an 41 y.o. female is here for the above.       Past Medical History:   Past Medical History:   Diagnosis Date    Essential hypertension 2019    Hyperlipidemia     IBS (irritable bowel syndrome)         Past Surgical History:  Past Surgical History:   Procedure Laterality Date    APPENDECTOMY       SECTION      WISDOM TOOTH EXTRACTION          Home Medications:  Prior to Admission medications    Medication Sig Start Date End Date Taking? Authorizing Provider   amLODIPine (NORVASC) 5 MG tablet Take 1 tablet (5 mg total) by mouth 2 (two) times a day. 22  Yes Otf Tirado MD   losartan (COZAAR) 25 MG tablet Take 1 tablet (25 mg total) by mouth once daily. 22 Yes Otf Tirado MD   acyclovir (ZOVIRAX) 400 MG tablet TK 1 T PO TID FOR 5 DAYS 20   Historical Provider   buPROPion (WELLBUTRIN XL) 150 MG TB24 tablet TAKE 1 TABLET(150 MG) BY MOUTH EVERY DAY 22   Kuldip Bentley MD   evolocumab (REPATHA SURECLICK) 140 mg/mL PnIj Inject 1 mL (140 mg total) into the skin every 14 (fourteen) days. 22  Otf Tirado MD   evolocumab (REPATHA SURECLICK) 140 mg/mL PnIj Inject 1 mL (140 mg total) into the skin every 14 (fourteen) days. 22  Otf Tirado MD   ibuprofen (ADVIL,MOTRIN) 600 MG tablet  20   Historical Provider   pantoprazole (PROTONIX) 40 MG tablet Take 1 tablet (40 mg total) by mouth once daily. 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    Occupation: [employer]   Tobacco Use    Smoking status: Former Smoker     Packs/day: 0.50     Years: 15.00     Pack years: 7.50     Types: Cigarettes     Quit date: 2018     Years since quittin.2    Smokeless tobacco: Never Used   Substance and Sexual Activity    Alcohol use: Not Currently    Drug use: No    Sexual activity: Yes     Social Determinants of Health     Financial Resource Strain: Low Risk     Difficulty of Paying Living Expenses: Not hard at all   Food Insecurity: No Food Insecurity    Worried About Running Out of Food in the Last Year: Never true    Ran Out of Food in the Last Year: Never true   Transportation Needs: No Transportation Needs    Lack of Transportation (Medical): No    Lack of Transportation (Non-Medical): No   Physical Activity: Insufficiently Active    Days of Exercise per Week: 3 days    Minutes of Exercise per Session: 30 min   Stress: Stress Concern Present    Feeling of Stress : To some extent   Social Connections: Unknown    Frequency of Communication with Friends and Family: More than three times a week    Frequency of Social Gatherings with Friends and Family: Twice a week    Active Member of Clubs or Organizations: No    Attends Club or Organization Meetings: Patient refused    Marital Status:    Housing Stability: Low Risk     Unable to Pay for Housing in the Last Year: No    Number of Places Lived in the Last Year: 1    Unstable Housing in the Last Year: No       Family History:  Family History   Adopted: Yes   Problem Relation Age of Onset    No Known Problems Mother     No Known Problems Father        ROS: No acute cardiac events, no acute respiratory complaints.     Physical Exam (all patients):    /78   Pulse 83   Temp 98.3 °F (36.8 °C)   Resp 15   Ht 5' 5" (1.651 m)   Wt 109.2 kg (240 lb 11.9 oz)   LMP 2022 (Exact Date)   " SpO2 100%   Breastfeeding No   BMI 40.06 kg/m²   Lungs: Clear to auscultation bilaterally, respirations unlabored  Heart: Regular rate and rhythm, S1 and S2 normal, no obvious murmurs  Abdomen:         Soft, non-tender, bowel sounds normal, no masses, no organomegaly    Lab Results   Component Value Date    WBC 7.22 01/29/2019    MCV 90 01/29/2019    RDW 14.0 01/29/2019     (H) 01/29/2019    GLU 91 05/28/2021    HGBA1C 5.2 01/29/2019    BUN 11 05/28/2021     05/28/2021    K 4.4 05/28/2021     05/28/2021        SEDATION PLAN: per anesthesia      History reviewed, vital signs satisfactory, cardiopulmonary status satisfactory, sedation options, risks and plans have been discussed with the patient  All their questions were answered and the patient agrees to the sedation procedures as planned and the patient is deemed an appropriate candidate for the sedation as planned.    Procedure explained to patient, informed consent obtained and placed in chart.    Tosin Yoo  7/26/2022  12:42 PM

## 2022-07-26 NOTE — PROVATION PATIENT INSTRUCTIONS
Discharge Summary/Instructions after an Endoscopic Procedure  Patient Name: Niesha Abarca  Patient MRN: [id number]  Patient YOB: 1980 Tuesday, July 26, 2022  Tosin Yoo MD  Dear patient,  As a result of recent federal legislation (The Federal Cures Act), you may   receive lab or pathology results from your procedure in your [facility]   account before your physician is able to contact you. Your physician or   their representative will relay the results to you with their   recommendations at their soonest availability.  Thank you,  RESTRICTIONS:  During your procedure today, you received medications for sedation.  These   medications may affect your judgment, balance and coordination.  Therefore,   for 24 hours, you have the following restrictions:   - DO NOT drive a car, operate machinery, make legal/financial decisions,   sign important papers or drink alcohol.    ACTIVITY:  Today: no heavy lifting, straining or running due to procedural   sedation/anesthesia.  The following day: return to full activity including work.  DIET:  Eat and drink normally unless instructed otherwise.     TREATMENT FOR COMMON SIDE EFFECTS:  - Mild abdominal pain, nausea, belching, bloating or excessive gas:  rest,   eat lightly and use a heating pad.  - Sore Throat: treat with throat lozenges and/or gargle with warm salt   water.  - Because air was used during the procedure, expelling large amounts of air   from your rectum or belching is normal.  - If a bowel prep was taken, you may not have a bowel movement for 1-3 days.    This is normal.  SYMPTOMS TO WATCH FOR AND REPORT TO YOUR PHYSICIAN:  1. Abdominal pain or bloating, other than gas cramps.  2. Chest pain.  3. Back pain.  4. Signs of infection such as: chills or fever occurring within 24 hours   after the procedure.  5. Rectal bleeding, which would show as bright red, maroon, or black stools.   (A tablespoon of blood from the rectum is not serious, especially if    hemorrhoids are present.)  6. Vomiting.  7. Weakness or dizziness.  GO DIRECTLY TO THE NEAREST EMERGENCY ROOM IF YOU HAVE ANY OF THE FOLLOWING:      Difficulty breathing              Chills and/or fever over 101 F   Persistent vomiting and/or vomiting blood   Severe abdominal pain   Severe chest pain   Black, tarry stools   Bleeding- more than one tablespoon   Any other symptom or condition that you feel may need urgent attention  Your doctor recommends these additional instructions:  If any biopsies were taken, your doctors clinic will contact you in 1 to 2   weeks with any results.  - Patient has a contact number available for emergencies.  The signs and   symptoms of potential delayed complications were discussed with the   patient.  Return to normal activities tomorrow.  Written discharge   instructions were provided to the patient.   - Discharge patient to home (via wheelchair).   - Resume previous diet today.   - Continue present medications.   - Repeat colonoscopy in 10 years for screening purposes.  For questions, problems or results please call your physician Tosin Yoo MD at Work:  [phone]  If you have any questions about the above instructions, call the GI   department at [phone] or call the endoscopy unit at [phone]   from 7am until 3 pm.  [facility] [place], EMERGENCY ROOM PHONE NUMBER:   [phone]  IF A COMPLICATION OR EMERGENCY SITUATION ARISES AND YOU ARE UNABLE TO REACH   YOUR PHYSICIAN - GO DIRECTLY TO THE EMERGENCY ROOM.  I have read or have had read to me these discharge instructions for my   procedure and have received a written copy.  I understand these   instructions and will follow-up with my physician if I have any questions.     __________________________________       _____________________________________  Nurse Signature                                          Patient/Designated   Responsible Party Signature  MD Tosin Robins  MD Fredo  7/26/2022 12:58:22 PM  This report has been verified and signed electronically.  Dear patient,  As a result of recent federal legislation (The Federal Cures Act), you may   receive lab or pathology results from your procedure in your [facility]   account before your physician is able to contact you. Your physician or   their representative will relay the results to you with their   recommendations at their soonest availability.  Thank you,  PROVATION

## 2022-07-26 NOTE — TRANSFER OF CARE
"Anesthesia Transfer of Care Note    Patient: Niesha Abarca    Procedure(s) Performed: Procedure(s) (LRB):  COLONOSCOPY (N/A)    Patient location: PACU    Anesthesia Type: general    Transport from OR: Transported from OR on room air with adequate spontaneous ventilation    Post pain: adequate analgesia    Post assessment: no apparent anesthetic complications and tolerated procedure well    Post vital signs: stable    Level of consciousness: awake    Nausea/Vomiting: no nausea/vomiting    Complications: none    Transfer of care protocol was followed      Last vitals:   Visit Vitals  /78   Pulse 83   Temp 36.8 °C (98.3 °F)   Resp 15   Ht 5' 5" (1.651 m)   Wt 109.2 kg (240 lb 11.9 oz)   LMP 07/13/2022 (Exact Date)   SpO2 100%   Breastfeeding No   BMI 40.06 kg/m²     "

## 2022-07-26 NOTE — ANESTHESIA POSTPROCEDURE EVALUATION
Anesthesia Post Evaluation    Patient: Niesha Abarca    Procedure(s) Performed: Procedure(s) (LRB):  COLONOSCOPY (N/A)    Final Anesthesia Type: general      Patient location during evaluation: PACU  Patient participation: Yes- Able to Participate  Level of consciousness: awake and alert and oriented  Post-procedure vital signs: reviewed and stable  Pain management: adequate  Airway patency: patent    PONV status at discharge: No PONV  Anesthetic complications: no      Cardiovascular status: blood pressure returned to baseline, stable and hemodynamically stable  Respiratory status: unassisted  Hydration status: euvolemic  Follow-up not needed.          Vitals Value Taken Time   /66 07/26/22 1310   Temp 36.7 °C (98.1 °F) 07/26/22 1300   Pulse 74 07/26/22 1310   Resp 25 07/26/22 1310   SpO2 97 % 07/26/22 1310   Vitals shown include unvalidated device data.      No case tracking events are documented in the log.      Pain/Michel Score: Michel Score: 9 (7/26/2022  1:00 PM)

## 2022-08-01 NOTE — TELEPHONE ENCOUNTER
Refill for Repatha received. Repatha copay card . Informed patient to call [phone] to renew before setting up refill.

## 2022-08-04 NOTE — TELEPHONE ENCOUNTER
Specialty Pharmacy - Refill Coordination    Specialty Medication Orders Linked to Encounter    Flowsheet Row Most Recent Value   Medication #1 evolocumab (REPATHA SURECLICK) 140 mg/mL PnIj (Order#[id number], Rx#[id number])        Refill Questions - Documented Responses    Flowsheet Row Most Recent Value   Patient Availability and HIPAA Verification    Does patient want to proceed with activity? Unable to Reach   HIPAA/medical authority confirmed? No   Relationship to patient of person spoken to? Self        We have had multiple attempts to the patient and have been unsuccessful to reach the patient. We will stop reaching out to the patient but in the event that the patient needs the med and contacts us, we will communicate and begin dispensing for the patient. At your next visit with the patient, please review the importance of being in contact with our specialty pharmacy as a part of our care team.      Mar Harris, Patient Care Assistant  [facility]  [address]  [facility] [address]  Phone: [phone]  Fax: [phone]

## 2022-08-22 ENCOUNTER — OFFICE VISIT (OUTPATIENT)
Dept: URGENT CARE | Facility: CLINIC | Age: 42
End: 2022-08-22
Payer: COMMERCIAL

## 2022-08-22 VITALS
SYSTOLIC BLOOD PRESSURE: 140 MMHG | DIASTOLIC BLOOD PRESSURE: 88 MMHG | OXYGEN SATURATION: 97 % | RESPIRATION RATE: 16 BRPM | TEMPERATURE: 99 F | HEIGHT: 65 IN | HEART RATE: 102 BPM | BODY MASS INDEX: 39.99 KG/M2 | WEIGHT: 240 LBS

## 2022-08-22 DIAGNOSIS — J02.0 STREP PHARYNGITIS: Primary | ICD-10-CM

## 2022-08-22 DIAGNOSIS — R13.10 PAINFUL SWALLOWING: ICD-10-CM

## 2022-08-22 PROCEDURE — 3077F SYST BP >= 140 MM HG: CPT | Mod: CPTII,S$GLB,, | Performed by: NURSE PRACTITIONER

## 2022-08-22 PROCEDURE — 99213 OFFICE O/P EST LOW 20 MIN: CPT | Mod: S$GLB,,, | Performed by: NURSE PRACTITIONER

## 2022-08-22 PROCEDURE — 1160F RVW MEDS BY RX/DR IN RCRD: CPT | Mod: CPTII,S$GLB,, | Performed by: NURSE PRACTITIONER

## 2022-08-22 PROCEDURE — 1159F PR MEDICATION LIST DOCUMENTED IN MEDICAL RECORD: ICD-10-PCS | Mod: CPTII,S$GLB,, | Performed by: NURSE PRACTITIONER

## 2022-08-22 PROCEDURE — 3008F PR BODY MASS INDEX (BMI) DOCUMENTED: ICD-10-PCS | Mod: CPTII,S$GLB,, | Performed by: NURSE PRACTITIONER

## 2022-08-22 PROCEDURE — 1160F PR REVIEW ALL MEDS BY PRESCRIBER/CLIN PHARMACIST DOCUMENTED: ICD-10-PCS | Mod: CPTII,S$GLB,, | Performed by: NURSE PRACTITIONER

## 2022-08-22 PROCEDURE — 4010F PR ACE/ARB THEARPY RXD/TAKEN: ICD-10-PCS | Mod: CPTII,S$GLB,, | Performed by: NURSE PRACTITIONER

## 2022-08-22 PROCEDURE — 3077F PR MOST RECENT SYSTOLIC BLOOD PRESSURE >= 140 MM HG: ICD-10-PCS | Mod: CPTII,S$GLB,, | Performed by: NURSE PRACTITIONER

## 2022-08-22 PROCEDURE — 3079F PR MOST RECENT DIASTOLIC BLOOD PRESSURE 80-89 MM HG: ICD-10-PCS | Mod: CPTII,S$GLB,, | Performed by: NURSE PRACTITIONER

## 2022-08-22 PROCEDURE — 3079F DIAST BP 80-89 MM HG: CPT | Mod: CPTII,S$GLB,, | Performed by: NURSE PRACTITIONER

## 2022-08-22 PROCEDURE — 4010F ACE/ARB THERAPY RXD/TAKEN: CPT | Mod: CPTII,S$GLB,, | Performed by: NURSE PRACTITIONER

## 2022-08-22 PROCEDURE — 1159F MED LIST DOCD IN RCRD: CPT | Mod: CPTII,S$GLB,, | Performed by: NURSE PRACTITIONER

## 2022-08-22 PROCEDURE — 99213 PR OFFICE/OUTPT VISIT, EST, LEVL III, 20-29 MIN: ICD-10-PCS | Mod: S$GLB,,, | Performed by: NURSE PRACTITIONER

## 2022-08-22 PROCEDURE — 3008F BODY MASS INDEX DOCD: CPT | Mod: CPTII,S$GLB,, | Performed by: NURSE PRACTITIONER

## 2022-08-22 RX ORDER — IBUPROFEN 800 MG/1
800 TABLET ORAL EVERY 8 HOURS PRN
Qty: 30 TABLET | Refills: 0 | Status: SHIPPED | OUTPATIENT
Start: 2022-08-22 | End: 2022-10-06

## 2022-08-22 RX ORDER — AMOXICILLIN AND CLAVULANATE POTASSIUM 875; 125 MG/1; MG/1
1 TABLET, FILM COATED ORAL
COMMUNITY
End: 2022-10-06 | Stop reason: ALTCHOICE

## 2022-08-22 NOTE — PROGRESS NOTES
"Subjective:       Patient ID: Niesha Abarca is a 41 y.o. female.    Vitals:  height is 5' 5" (1.651 m) and weight is 108.9 kg (240 lb). Her tympanic temperature is 99 °F (37.2 °C). Her blood pressure is 140/88 (abnormal) and her pulse is 102. Her respiration is 16 and oxygen saturation is 97%.     Chief Complaint: Sore Throat (Right ear pain/)    Pt presents to the clinic today with sore throat and left ear pain. Pt states that she was seen at another clinic on Saturday and placed on antibiotics, but feels as though she is not getting any better. Has taken 400 mg ibuprofen to help with pain.    Sore Throat   This is a new problem. The current episode started in the past 7 days (3 days). The problem has been gradually worsening. The pain is worse on the right side. There has been no fever. The pain is at a severity of 8/10. The pain is moderate. Associated symptoms include congestion, ear pain, headaches, a hoarse voice, swollen glands and trouble swallowing. Treatments tried: Augmentin 875 mg. The treatment provided no relief.       HENT: Positive for ear pain, congestion, sore throat and trouble swallowing.    Neurological: Positive for headaches.       Objective:      Physical Exam   Constitutional: She is oriented to person, place, and time. She appears well-developed. She is cooperative.  Non-toxic appearance. She does not appear ill. No distress.   HENT:   Head: Normocephalic and atraumatic.   Ears:   Right Ear: Hearing, tympanic membrane, external ear and ear canal normal. No middle ear effusion.   Left Ear: Hearing, tympanic membrane, external ear and ear canal normal.  No middle ear effusion.   Nose: Nose normal. No mucosal edema, rhinorrhea or nasal deformity. No epistaxis. Right sinus exhibits no maxillary sinus tenderness and no frontal sinus tenderness. Left sinus exhibits no maxillary sinus tenderness and no frontal sinus tenderness.   Mouth/Throat: Uvula is midline and mucous membranes are " normal. No trismus in the jaw. Normal dentition. No uvula swelling. Posterior oropharyngeal erythema present. No oropharyngeal exudate or posterior oropharyngeal edema.   Eyes: Conjunctivae and lids are normal. No scleral icterus.   Neck: Trachea normal and phonation normal. Neck supple. No edema present. No erythema present. No neck rigidity present.   Cardiovascular: Normal rate, regular rhythm, normal heart sounds and normal pulses.   Pulmonary/Chest: Effort normal and breath sounds normal. No respiratory distress. She has no decreased breath sounds. She has no wheezes. She has no rhonchi.   Abdominal: Normal appearance.   Musculoskeletal: Normal range of motion.         General: No deformity. Normal range of motion.   Neurological: She is alert and oriented to person, place, and time. She exhibits normal muscle tone. Coordination normal.   Skin: Skin is warm, dry, intact, not diaphoretic and not pale.   Psychiatric: Her speech is normal and behavior is normal. Judgment and thought content normal.   Nursing note and vitals reviewed.        Assessment:       1. Strep pharyngitis    2. Painful swallowing          Plan:         Strep pharyngitis  -     ibuprofen (ADVIL,MOTRIN) 800 MG tablet; Take 1 tablet (800 mg total) by mouth every 8 (eight) hours as needed for Pain.  Dispense: 30 tablet; Refill: 0    Painful swallowing  -     ibuprofen (ADVIL,MOTRIN) 800 MG tablet; Take 1 tablet (800 mg total) by mouth every 8 (eight) hours as needed for Pain.  Dispense: 30 tablet; Refill: 0                  MDM: Advised pt to give at least 48 hours for antibiotics to begin working. Pt is requesting a steroid shot to help with pain. Informed her that was not a recommended treatment for strep pharyngitis but I could send a prescription strength ibuprofen to help with her throat pain and inflammation.    · Take antibiotics exactly as prescribed. Do not stop taking antibiotics sooner than instructed in order to prevent recurrence  of infection and antibiotic resistance.   · Once your fever has resolved and you have been taking antibiotics for at least 24 hours, you are no longer considered contagious and may return to work or school.   · Take Ibuprofen as prescribed for fever, throat pain, or body aches.   · For sore throat, gargling with warm salt water, Cepacol throat lozenges, or Chloraseptic spray may help with pain.  · Make sure to get a new toothbrush after you have been on antibiotics for 24-48 hours. Please contact your primary care provider if symptoms do not improve within 2 days or sooner for any new or worsening symptoms.  · Please go to the ER for any worsening in your condition including: hives, rash, increased pain or swelling to throat, persistent fever that does not improve with Tylenol/Motrin use, dark urine, severe headache, vision changes, neck stiffness, lethargy, or for any other new or concerning symptoms.

## 2022-08-22 NOTE — PATIENT INSTRUCTIONS
Take antibiotics exactly as prescribed. Do not stop taking antibiotics sooner than instructed in order to prevent recurrence of infection and antibiotic resistance.   Once your fever has resolved and you have been taking antibiotics for at least 24 hours, you are no longer considered contagious and may return to work or school.   Take Ibuprofen as prescribed for fever, throat pain, or body aches.   For sore throat, gargling with warm salt water, Cepacol throat lozenges, or Chloraseptic spray may help with pain.  Make sure to get a new toothbrush after you have been on antibiotics for 24-48 hours. Please contact your primary care provider if symptoms do not improve within 2 days or sooner for any new or worsening symptoms.  Please go to the ER for any worsening in your condition including: hives, rash, increased pain or swelling to throat, persistent fever that does not improve with Tylenol/Motrin use, dark urine, severe headache, vision changes, neck stiffness, lethargy, or for any other new or concerning symptoms.

## 2022-08-25 ENCOUNTER — TELEPHONE (OUTPATIENT)
Dept: URGENT CARE | Facility: CLINIC | Age: 42
End: 2022-08-25
Payer: COMMERCIAL

## 2022-09-20 ENCOUNTER — TELEPHONE (OUTPATIENT)
Dept: SURGERY | Facility: CLINIC | Age: 42
End: 2022-09-20
Payer: COMMERCIAL

## 2022-09-20 DIAGNOSIS — Z12.39 BREAST CANCER SCREENING, HIGH RISK PATIENT: Primary | ICD-10-CM

## 2022-09-20 NOTE — TELEPHONE ENCOUNTER
Spoke to and scheduled patient's Screening Bi-Lateral Mammo for Friday 9/23/2022 [facility] @ 0800 and her New Patient High Risk appointment with Dr Devi for Friday 9/23/2022 [facility] @ 0930 - Advised patient to reach out if she has any questions or concerns

## 2022-09-23 ENCOUNTER — HOSPITAL ENCOUNTER (OUTPATIENT)
Dept: RADIOLOGY | Facility: HOSPITAL | Age: 42
Discharge: HOME | End: 2022-09-23
Attending: SURGERY
Payer: COMMERCIAL

## 2022-09-23 ENCOUNTER — OFFICE VISIT (OUTPATIENT)
Dept: SURGERY | Facility: CLINIC | Age: 42
End: 2022-09-23
Payer: COMMERCIAL

## 2022-09-23 DIAGNOSIS — L24.89 IRRITANT CONTACT DERMATITIS DUE TO OTHER AGENTS: ICD-10-CM

## 2022-09-23 DIAGNOSIS — Z12.39 BREAST CANCER SCREENING, HIGH RISK PATIENT: ICD-10-CM

## 2022-09-23 DIAGNOSIS — Z91.89 AT HIGH RISK FOR BREAST CANCER: ICD-10-CM

## 2022-09-23 PROCEDURE — 1159F MED LIST DOCD IN RCRD: CPT | Mod: CPTII,S$GLB,, | Performed by: SURGERY

## 2022-09-23 PROCEDURE — 77063 BREAST TOMOSYNTHESIS BI: CPT | Mod: TC

## 2022-09-23 PROCEDURE — 4010F PR ACE/ARB THEARPY RXD/TAKEN: ICD-10-PCS | Mod: CPTII,S$GLB,, | Performed by: SURGERY

## 2022-09-23 PROCEDURE — 77067 SCR MAMMO BI INCL CAD: CPT | Mod: 26,,, | Performed by: RADIOLOGY

## 2022-09-23 PROCEDURE — 4010F ACE/ARB THERAPY RXD/TAKEN: CPT | Mod: CPTII,S$GLB,, | Performed by: SURGERY

## 2022-09-23 PROCEDURE — 99203 PR OFFICE/OUTPT VISIT, NEW, LEVL III, 30-44 MIN: ICD-10-PCS | Mod: S$GLB,,, | Performed by: SURGERY

## 2022-09-23 PROCEDURE — 77067 MAMMO DIGITAL SCREENING BILAT WITH TOMO: ICD-10-PCS | Mod: 26,,, | Performed by: RADIOLOGY

## 2022-09-23 PROCEDURE — 77063 MAMMO DIGITAL SCREENING BILAT WITH TOMO: ICD-10-PCS | Mod: 26,,, | Performed by: RADIOLOGY

## 2022-09-23 PROCEDURE — 1159F PR MEDICATION LIST DOCUMENTED IN MEDICAL RECORD: ICD-10-PCS | Mod: CPTII,S$GLB,, | Performed by: SURGERY

## 2022-09-23 PROCEDURE — 99999 PR PBB SHADOW E&M-EST. PATIENT-LVL II: CPT | Mod: PBBFAC,,, | Performed by: SURGERY

## 2022-09-23 PROCEDURE — 77063 BREAST TOMOSYNTHESIS BI: CPT | Mod: 26,,, | Performed by: RADIOLOGY

## 2022-09-23 PROCEDURE — 77067 SCR MAMMO BI INCL CAD: CPT | Mod: TC

## 2022-09-23 PROCEDURE — 99203 OFFICE O/P NEW LOW 30 MIN: CPT | Mod: S$GLB,,, | Performed by: SURGERY

## 2022-09-23 PROCEDURE — 99999 PR PBB SHADOW E&M-EST. PATIENT-LVL II: ICD-10-PCS | Mod: PBBFAC,,, | Performed by: SURGERY

## 2022-09-23 RX ORDER — MOMETASONE FUROATE 1 MG/G
CREAM TOPICAL DAILY
Qty: 45 G | Refills: 0 | Status: SHIPPED | OUTPATIENT
Start: 2022-09-23 | End: 2022-10-06

## 2022-09-23 NOTE — PROGRESS NOTES
Breast Surgical Oncology  [place]  [facility]        PCP:  Kuldip Bentley MD  Date of Service: 2022    CHIEF COMPLAINT:   At high-risk for breast cancer    DIAGNOSIS:   Niesha Abarca is a 42 y.o. female who is kindly referred by Yulia Das for evaluation of increased risk of breast cancer based on family history and elevated risk score.     She has been followed at [facility] for her high risk breast cancer needs with breast MRI and imaging. The patient is adopted and has recently identified a family history of breast cancer. She is an established patient of Dr. Phillips, who she sees annually in the fall. She denies breast concerns such as pain, masses, skin changes, nipple discharge, nipple retraction or lumps under the arm.     Her breast cancer risk factor profile is as follows: Menarche at 13, Menopause at N/A.  She is . Age at first live birth was 38. HRT: no She did not breastfeed.     Other breast cancer risk factors include family hx on father's side, menarche before age 12, family hx of Prostate CA, and family hx of melanoma.     FAMILY HISTORY:     Family History   Adopted: Yes   Problem Relation Age of Onset    No Known Problems Mother     No Known Problems Father     Breast cancer Paternal Grandmother      Detailed family history:   Paternal grandmother with breast cancer at unknown age, unknown age at death  Maternal grandfather with prostate cancer at unknown age, unknown age at death  Mother with melanoma at age 40  Mother with lymphoma at age 40, currently living     PAST MEDICAL HISTORY:     Past Medical History:   Diagnosis Date    Anxiety     Essential hypertension 2019    Hyperlipidemia     IBS (irritable bowel syndrome)        SURGICAL HISTORY:     Past Surgical History:   Procedure Laterality Date    APPENDECTOMY       SECTION      COLONOSCOPY N/A 2022    Procedure: COLONOSCOPY;  Surgeon: Tosin Yoo MD;  Location: [facility];   Service: Endoscopy;  Laterality: N/A;    WISDOM TOOTH EXTRACTION         SOCIAL HISTORY:     Social History     Tobacco Use    Smoking status: Former     Packs/day: 0.50     Years: 15.00     Pack years: 7.50     Types: Cigarettes     Quit date: 2018     Years since quittin.3    Smokeless tobacco: Never   Substance Use Topics    Alcohol use: Not Currently    Drug use: No      She works as a  in [address]. She is  with one son.     MEDICATIONS/ALLERGIES:     Current Outpatient Medications   Medication Instructions    acyclovir (ZOVIRAX) 400 MG tablet TK 1 T PO TID FOR 5 DAYS    amLODIPine (NORVASC) 5 mg, Oral, 2 times daily    amoxicillin-clavulanate 875-125mg (AUGMENTIN) 875-125 mg per tablet 1 tablet, Oral, Every 12 hours (non-standard times)    buPROPion (WELLBUTRIN XL) 150 MG TB24 tablet TAKE 1 TABLET(150 MG) BY MOUTH EVERY DAY    ibuprofen (ADVIL,MOTRIN) 600 MG tablet No dose, route, or frequency recorded.    ibuprofen (ADVIL,MOTRIN) 800 mg, Oral, Every 8 hours PRN    losartan (COZAAR) 25 mg, Oral, Daily    mometasone 0.1% (ELOCON) 0.1 % cream Topical (Top), Daily    pantoprazole (PROTONIX) 40 mg, Oral, Daily     Review of patient's allergies indicates:  No Known Allergies    REVIEW OF SYSTEMS:   I have reviewed 12 systems, including 2 points per system. Pertinent positives reported are: anxiety, sinus pain, and rash    PHYSICAL EXAM:   General: The patient appears well and is in no acute distress.     BREAST EXAM  No Asymmetry  Right:  - Mass: No  - Skin change: yes, contact dermatitis to upper outer quadrant   - Nipple Discharge: No  - Nipple retraction: No  - Axillary LAD: No  Left:   - Mass: No  - Skin change: No  - Nipple Discharge: No  - Nipple retraction: No  - Axillary LAD: No    IMAGING:   Results for orders placed during the hospital encounter of 22    Mammo Digital Screening Bilat w/ Chuy    Narrative  Result:  Mammo Digital Screening Bilat w/  Chuy    History:  Patient is 42 y.o. and is seen for a screening mammogram.      Films Compared:  Prior images (if available) were compared.    Findings:  This procedure was performed using tomosynthesis.  Computer-aided detection was utilized in the interpretation of this examination.    The breasts have scattered areas of fibroglandular density. There is no evidence of suspicious masses, microcalcifications or architectural distortion.    Impression  No mammographic evidence of malignancy.    BI-RADS Category 1: Negative    Recommendation:  Routine screening mammogram in 1 year is recommended.    Your estimated lifetime risk of breast cancer (to age 85) based on Tyrer-Cuzick risk assessment model is 21.02 %.  According to the American Cancer Society, patients with a lifetime breast cancer risk of 20% or higher might benefit from supplemental screening tests. ??      PATHOLOGY:   none    ASSESSMENT:     1. At high risk for breast cancer    2. Irritant contact dermatitis due to other agents          PLAN:   Niesha Abarca is a 42 y.o. female who presents for evaluation in the high risk program.  She was identified for the program by her PCP due to having a elevated score by a risk assessment model and family history of breast or ovarian cancer.  Her lifetime risk for the development of breast cancer by the Tyrer Cuzick v8 model is 21.02%.  Therefore, she meets criteria to be followed and screened as a high risk patient.      We reviewed the NCCN guidelines for high risk women to be the following:   Twice annual clinical breast exam  Screening mammogram beginning 10 years earlier than the youngest affected family member  Consideration of supplemental annual imaging alternating with her mammogram on the 6 month interval. We discussed that the guidelines currently include breast MRI as the supplemental imaging option.   Adopting risk-reduction strategies and   Consideration of chemoprevention.      Her  individualized plan is the following:  She will see me each March for a clinical breast exam and see her OBGYN Dr. Phillips each September for her second annual clinical breast exam.    She will have her mammogram each September.    She would like to proceed with bilateral diagnostic 3D mammogram annually for now and contact us when she is ready for her next breast MRI.  Ideally these will be done in the spring.   Regarding risk reduction, she is recommended to maintain a healthy weight (BMI 18-25), regular aerobic exercise (at least 150 minutes/week), balanced healthy diet (high in vegetables, fruits, and whole grains) and avoidance of red meats, processed foods, & refined sugars. , and limit alcohol consumption .    We discussed the findings of the NSABP Prevention One trial and the potential side effects of tamoxifen. She is not interested in chemoprevention at this time. .    Niesha Abarca has a normal breast exam today. We discussed the possible signs and symptoms of breast cancer as lump, masses, new asymmetries, skin changes and nipple changes. She is encouraged to contact me if any new breast concerns arise.      Mometasone cream sent to pharmacy for right breast rash.     I spent a total of 30 minutes on this visit. This includes face to face time and non-face to face time preparing to see the patient (eg, review of tests), obtaining and/or reviewing separately obtained history, documenting clinical information in the electronic or other health record, independently interpreting results and communicating results to the patient/family/caregiver, or care coordinator.

## 2022-09-28 ENCOUNTER — PATIENT MESSAGE (OUTPATIENT)
Dept: CARDIOLOGY | Facility: CLINIC | Age: 42
End: 2022-09-28
Payer: COMMERCIAL

## 2022-10-04 ENCOUNTER — LAB VISIT (OUTPATIENT)
Dept: LAB | Facility: HOSPITAL | Age: 42
End: 2022-10-04
Attending: FAMILY MEDICINE
Payer: COMMERCIAL

## 2022-10-04 ENCOUNTER — OFFICE VISIT (OUTPATIENT)
Dept: CARDIOLOGY | Facility: CLINIC | Age: 42
End: 2022-10-04
Payer: COMMERCIAL

## 2022-10-04 VITALS
SYSTOLIC BLOOD PRESSURE: 142 MMHG | WEIGHT: 244.25 LBS | BODY MASS INDEX: 40.69 KG/M2 | HEART RATE: 100 BPM | HEIGHT: 65 IN | DIASTOLIC BLOOD PRESSURE: 88 MMHG | OXYGEN SATURATION: 99 %

## 2022-10-04 DIAGNOSIS — K21.9 GASTROESOPHAGEAL REFLUX DISEASE, UNSPECIFIED WHETHER ESOPHAGITIS PRESENT: ICD-10-CM

## 2022-10-04 DIAGNOSIS — E66.9 OBESITY (BMI 30-39.9): ICD-10-CM

## 2022-10-04 DIAGNOSIS — Z78.9 STATIN INTOLERANCE: ICD-10-CM

## 2022-10-04 DIAGNOSIS — Z82.49 FAMILY HISTORY OF EARLY CAD: ICD-10-CM

## 2022-10-04 DIAGNOSIS — E78.2 MIXED HYPERLIPIDEMIA: ICD-10-CM

## 2022-10-04 DIAGNOSIS — G47.33 OSA (OBSTRUCTIVE SLEEP APNEA): ICD-10-CM

## 2022-10-04 DIAGNOSIS — I10 ESSENTIAL HYPERTENSION: Primary | ICD-10-CM

## 2022-10-04 DIAGNOSIS — Z00.00 ANNUAL PHYSICAL EXAM: ICD-10-CM

## 2022-10-04 DIAGNOSIS — Z86.16 HISTORY OF COVID-19: ICD-10-CM

## 2022-10-04 DIAGNOSIS — K58.8 OTHER IRRITABLE BOWEL SYNDROME: ICD-10-CM

## 2022-10-04 LAB
ALBUMIN SERPL BCP-MCNC: 3.7 G/DL (ref 3.5–5.2)
ALP SERPL-CCNC: 66 U/L (ref 55–135)
ALT SERPL W/O P-5'-P-CCNC: 22 U/L (ref 10–44)
ANION GAP SERPL CALC-SCNC: 9 MMOL/L (ref 8–16)
AST SERPL-CCNC: 12 U/L (ref 10–40)
BASOPHILS # BLD AUTO: 0.07 K/UL (ref 0–0.2)
BASOPHILS NFR BLD: 1.1 % (ref 0–1.9)
BILIRUB SERPL-MCNC: 0.3 MG/DL (ref 0.1–1)
BUN SERPL-MCNC: 16 MG/DL (ref 6–20)
CALCIUM SERPL-MCNC: 9.1 MG/DL (ref 8.7–10.5)
CHLORIDE SERPL-SCNC: 103 MMOL/L (ref 95–110)
CHOLEST SERPL-MCNC: 277 MG/DL (ref 120–199)
CHOLEST/HDLC SERPL: 5.4 {RATIO} (ref 2–5)
CO2 SERPL-SCNC: 24 MMOL/L (ref 23–29)
CREAT SERPL-MCNC: 0.7 MG/DL (ref 0.5–1.4)
DIFFERENTIAL METHOD: ABNORMAL
EOSINOPHIL # BLD AUTO: 0.2 K/UL (ref 0–0.5)
EOSINOPHIL NFR BLD: 3.6 % (ref 0–8)
ERYTHROCYTE [DISTWIDTH] IN BLOOD BY AUTOMATED COUNT: 14.4 % (ref 11.5–14.5)
EST. GFR  (NO RACE VARIABLE): >60 ML/MIN/1.73 M^2
ESTIMATED AVG GLUCOSE: 108 MG/DL (ref 68–131)
GLUCOSE SERPL-MCNC: 92 MG/DL (ref 70–110)
HBA1C MFR BLD: 5.4 % (ref 4–5.6)
HCT VFR BLD AUTO: 38.8 % (ref 37–48.5)
HCV AB SERPL QL IA: NORMAL
HDLC SERPL-MCNC: 51 MG/DL (ref 40–75)
HDLC SERPL: 18.4 % (ref 20–50)
HGB BLD-MCNC: 11.7 G/DL (ref 12–16)
HIV 1+2 AB+HIV1 P24 AG SERPL QL IA: NORMAL
IMM GRANULOCYTES # BLD AUTO: 0.01 K/UL (ref 0–0.04)
IMM GRANULOCYTES NFR BLD AUTO: 0.2 % (ref 0–0.5)
INSULIN COLLECTION INTERVAL: NORMAL
INSULIN SERPL-ACNC: 8.5 UU/ML
LDLC SERPL CALC-MCNC: 209 MG/DL (ref 63–159)
LYMPHOCYTES # BLD AUTO: 1.9 K/UL (ref 1–4.8)
LYMPHOCYTES NFR BLD: 30.4 % (ref 18–48)
MCH RBC QN AUTO: 26.5 PG (ref 27–31)
MCHC RBC AUTO-ENTMCNC: 30.2 G/DL (ref 32–36)
MCV RBC AUTO: 88 FL (ref 82–98)
MONOCYTES # BLD AUTO: 0.5 K/UL (ref 0.3–1)
MONOCYTES NFR BLD: 7.4 % (ref 4–15)
NEUTROPHILS # BLD AUTO: 3.5 K/UL (ref 1.8–7.7)
NEUTROPHILS NFR BLD: 57.3 % (ref 38–73)
NONHDLC SERPL-MCNC: 226 MG/DL
NRBC BLD-RTO: 0 /100 WBC
PLATELET # BLD AUTO: 402 K/UL (ref 150–450)
PMV BLD AUTO: 10.5 FL (ref 9.2–12.9)
POTASSIUM SERPL-SCNC: 4.3 MMOL/L (ref 3.5–5.1)
PROT SERPL-MCNC: 6.8 G/DL (ref 6–8.4)
RBC # BLD AUTO: 4.41 M/UL (ref 4–5.4)
SODIUM SERPL-SCNC: 136 MMOL/L (ref 136–145)
TRIGL SERPL-MCNC: 85 MG/DL (ref 30–150)
TSH SERPL DL<=0.005 MIU/L-ACNC: 1.57 UIU/ML (ref 0.4–4)
WBC # BLD AUTO: 6.18 K/UL (ref 3.9–12.7)

## 2022-10-04 PROCEDURE — 3077F SYST BP >= 140 MM HG: CPT | Mod: CPTII,S$GLB,, | Performed by: INTERNAL MEDICINE

## 2022-10-04 PROCEDURE — 3008F PR BODY MASS INDEX (BMI) DOCUMENTED: ICD-10-PCS | Mod: CPTII,S$GLB,, | Performed by: INTERNAL MEDICINE

## 2022-10-04 PROCEDURE — 1160F RVW MEDS BY RX/DR IN RCRD: CPT | Mod: CPTII,S$GLB,, | Performed by: INTERNAL MEDICINE

## 2022-10-04 PROCEDURE — 84443 ASSAY THYROID STIM HORMONE: CPT | Performed by: FAMILY MEDICINE

## 2022-10-04 PROCEDURE — 3079F PR MOST RECENT DIASTOLIC BLOOD PRESSURE 80-89 MM HG: ICD-10-PCS | Mod: CPTII,S$GLB,, | Performed by: INTERNAL MEDICINE

## 2022-10-04 PROCEDURE — 85025 COMPLETE CBC W/AUTO DIFF WBC: CPT | Performed by: FAMILY MEDICINE

## 2022-10-04 PROCEDURE — 1160F PR REVIEW ALL MEDS BY PRESCRIBER/CLIN PHARMACIST DOCUMENTED: ICD-10-PCS | Mod: CPTII,S$GLB,, | Performed by: INTERNAL MEDICINE

## 2022-10-04 PROCEDURE — 80053 COMPREHEN METABOLIC PANEL: CPT | Performed by: FAMILY MEDICINE

## 2022-10-04 PROCEDURE — 99999 PR PBB SHADOW E&M-EST. PATIENT-LVL IV: CPT | Mod: PBBFAC,,, | Performed by: INTERNAL MEDICINE

## 2022-10-04 PROCEDURE — 1159F PR MEDICATION LIST DOCUMENTED IN MEDICAL RECORD: ICD-10-PCS | Mod: CPTII,S$GLB,, | Performed by: INTERNAL MEDICINE

## 2022-10-04 PROCEDURE — 36415 COLL VENOUS BLD VENIPUNCTURE: CPT | Performed by: FAMILY MEDICINE

## 2022-10-04 PROCEDURE — 3077F PR MOST RECENT SYSTOLIC BLOOD PRESSURE >= 140 MM HG: ICD-10-PCS | Mod: CPTII,S$GLB,, | Performed by: INTERNAL MEDICINE

## 2022-10-04 PROCEDURE — 1159F MED LIST DOCD IN RCRD: CPT | Mod: CPTII,S$GLB,, | Performed by: INTERNAL MEDICINE

## 2022-10-04 PROCEDURE — 99999 PR PBB SHADOW E&M-EST. PATIENT-LVL IV: ICD-10-PCS | Mod: PBBFAC,,, | Performed by: INTERNAL MEDICINE

## 2022-10-04 PROCEDURE — 4010F ACE/ARB THERAPY RXD/TAKEN: CPT | Mod: CPTII,S$GLB,, | Performed by: INTERNAL MEDICINE

## 2022-10-04 PROCEDURE — 83036 HEMOGLOBIN GLYCOSYLATED A1C: CPT | Performed by: FAMILY MEDICINE

## 2022-10-04 PROCEDURE — 3079F DIAST BP 80-89 MM HG: CPT | Mod: CPTII,S$GLB,, | Performed by: INTERNAL MEDICINE

## 2022-10-04 PROCEDURE — 80061 LIPID PANEL: CPT | Performed by: FAMILY MEDICINE

## 2022-10-04 PROCEDURE — 3008F BODY MASS INDEX DOCD: CPT | Mod: CPTII,S$GLB,, | Performed by: INTERNAL MEDICINE

## 2022-10-04 PROCEDURE — 87389 HIV-1 AG W/HIV-1&-2 AB AG IA: CPT | Performed by: FAMILY MEDICINE

## 2022-10-04 PROCEDURE — 86803 HEPATITIS C AB TEST: CPT | Performed by: FAMILY MEDICINE

## 2022-10-04 PROCEDURE — 99214 OFFICE O/P EST MOD 30 MIN: CPT | Mod: S$GLB,,, | Performed by: INTERNAL MEDICINE

## 2022-10-04 PROCEDURE — 99214 PR OFFICE/OUTPT VISIT, EST, LEVL IV, 30-39 MIN: ICD-10-PCS | Mod: S$GLB,,, | Performed by: INTERNAL MEDICINE

## 2022-10-04 PROCEDURE — 4010F PR ACE/ARB THEARPY RXD/TAKEN: ICD-10-PCS | Mod: CPTII,S$GLB,, | Performed by: INTERNAL MEDICINE

## 2022-10-04 PROCEDURE — 83525 ASSAY OF INSULIN: CPT | Performed by: FAMILY MEDICINE

## 2022-10-04 RX ORDER — LOSARTAN POTASSIUM 50 MG/1
50 TABLET ORAL NIGHTLY
Qty: 90 TABLET | Refills: 3 | Status: SHIPPED | OUTPATIENT
Start: 2022-10-04 | End: 2023-06-21 | Stop reason: SDUPTHER

## 2022-10-04 NOTE — PROGRESS NOTES
Subjective:   Patient ID:  Niesha Abarca is a 42 y.o. female who presents for cardiac consult of Hypertension    Follow-up  Associated symptoms include chest pain.   Hypertension  Associated symptoms include chest pain and malaise/fatigue.   The patient came in today for cardiac consult of Hypertension    Referring Physician: Kuldip Bentley MD   Reason for initial consult: CVD Risk, HLD    Niesha Abarca is a 42 y.o. female pt with HTN, HLD, Pre-ecclamsia, insulin resistance,  obesity, FH CAD, prior smoker presents for follow up CV evaluation.    19  She had pre-ecclampsia - Oct 2018 and has had high BP since then. She was on Procardia initially and now has been on Labetolol. Has had more fatigue - will change BB to CCB. Maternal grandmother  at age 37, recently found out info as she was adopted. She was on statin in the past and felt muscle pain/fatigue and took months to feel normal. Was taking metformin before for insulin resistance - A1c normal now. Occ L side atypical pain, unsure if anxiety caused. No exertional CP.      19  ECG stress neg for ischemia but HTN up to 230s/60s. ECHO overall normal. , on Zetia will start. Was on simvastatin in the past. No CP.     20  LDL remains 200 recent lipids. Zetia started last visit but could not tolerate, she has felt SOB/fatigue and after 6 weeks had to stop taking it. Breathing has improved, Repatha ordered.   ECG - NSR with sinus arrythmia     3/226/20  She could not tolerate Simvastatin in the past, could not tolerate Zetia. Per pharmacist - Patient has only tried simvastatin and reports myalgias, but no further details. This information is insufficient per plan for Repatha coverage. Msg sent to provider to advise. Appeal window: 180 days. TTN  Occ headaches, told to check BP, no fevers/chills    20  Ordered Repatha but not approved, on pravastatin now. She has tried it for a few months has severe muscle pain and joint  pain could not tolerate. Discussed will stop and reorder Repatha.   No CP/SOB.     11/30/20  Has been on Repatha, recent LDL improved to 117 from 193. CK and CMP normal. She had cold symptoms after first shot, neg COVID test and then was ok. She has more back ache than normal. She has back pain at times. She just arrived from work, BP elevated. Had Thanksgiving recently with extra salt. Occ chest pain at times, more anxiety at times.     4/19/21  BP elevated 130s/90s, HR 95. She had dental work a few weeks ago, BP was 160/110 had her first cavity filling. She had 2nd COVID vaccine last week. She has been having more CP, in center of chest pain  ECG -NSR, poor RWP - old     7/27/21  Recent LDL worse 134 from 117. Sleep study with mild CARMEN. Added benicar last visit. BP elevated today 148/94. Bp at home has been normal. Still has atypical CP.    10/8/21  BP elevated 150/86. . She had strep throat 2 weeks ago. 134/75 at prior PCP visit. She went to GI and had started protonix. Reflux improved now. She will get mouthguard for CARMEN.    1/12/22  Lipids improved in Sept '21.  BP elevated 150s/80s. She had COVID 3 weeks ago, she feels more tired, had headache and fever. She still has stuffy nose.   ECG - NSR    7/14/22  BP and HR well controlled. BMI 40 - 240 lbs.   ECG - NSR, LVH    10/4/22    Hi Mathew Silver been having high bp. 9/26 went to urgent care after I felt dizzy, faint, generally unwell & checked my bp at work- it was 160/101. When I got to urgent care it was down to 157/97. Tested for Covid & flu- both neg. Did an EKG it was fine. Sent me to the ER to be sure. ER repeated EKG & did bloodwork all came back fine. ER dr said likely due to stress of recent life events over the last few weeks & to track my bp. Im taking the losartan & amlodipine both at night & the amlodipine again in the morning. BP has been around 154/94. Im wondering if I need a medication adjustment or should come in to see you. I made  an appt w/ Dr. Bentley since she had the first available appointment but wanted to check with you.    Has been having elevated BP at times, had some back pain thought due to Repatha and stopped taking it briefly. Pt has been having more stress at home. BP today 142/88, BMI 40 - 244 lbs.     Patient feels no sob, no leg swelling, no PND, no palpitation, no dizziness, no syncope, no CNS symptoms.    Patient has fairly good exercise tolerance. Is a teacher, 5th grade.     Patient is compliant with medications.    FH - early CAD - maternal GM      Sleep study  PHYSICIAN INTERPRETATION AND COMMENTS: Findings are consistent with mild, positional obstructive sleep apnea(CARMEN).  Overall AHI was 8.0/hr. SpO2 kishan was 85.4%. Therapy should be considered.  CLINICAL HISTORY: 40 year old female presented with: 16 inch neck, BMI of 40.1, an [place] sleepiness score of 3, history  of hypertension, diabetes and symptoms of nocturnal snoring. Based on the clinical history, the patient has a high pre-test  probability of having Moderate CARMEN. STOPBANG score 5. [place] score 5.      EKG Conclusions:    1. The EKG portion of this study is negative for ischemia at a moderate workload, and peak heart rate of 164 bpm (94% of predicted).   2. Exercise capacity is average.   3. Blood pressure response to exercise was hypertensive (Presenting BP: 147/82 Peak BP: 234/60).   4. No significant arrhythmias were present.   5. There were no symptoms of chest discomfort or significant dyspnea throughout the protocol.   6. The Duke treadmill score was 7 suggesting a low probability for future cardiovascular events.    This document has been electronically    SIGNED BY: Bayron Rankin MD On: 08/03/2019 06:43    CONCLUSIONS     1 - No wall motion abnormalities.     2 - Normal left ventricular systolic function (EF 55-60%).     3 - Normal left ventricular diastolic function.     4 - Normal right ventricular systolic function .     5 - The estimated PA  systolic pressure is 23 mmHg.       This document has been electronically    SIGNED BY: Hola Jerry MD On: 2019 15:16    Past Medical History:   Diagnosis Date    Anxiety     Essential hypertension 2019    Hyperlipidemia     IBS (irritable bowel syndrome)        Past Surgical History:   Procedure Laterality Date    APPENDECTOMY       SECTION      COLONOSCOPY N/A 2022    Procedure: COLONOSCOPY;  Surgeon: Tosin Yoo MD;  Location: [facility];  Service: Endoscopy;  Laterality: N/A;    WISDOM TOOTH EXTRACTION         Social History     Tobacco Use    Smoking status: Former     Packs/day: 0.50     Years: 15.00     Pack years: 7.50     Types: Cigarettes     Quit date: 2018     Years since quittin.4    Smokeless tobacco: Never   Substance Use Topics    Alcohol use: Not Currently    Drug use: No       Family History   Adopted: Yes   Problem Relation Age of Onset    No Known Problems Mother     No Known Problems Father     Breast cancer Paternal Grandmother        Patient's Medications   New Prescriptions    No medications on file   Previous Medications    ACYCLOVIR (ZOVIRAX) 400 MG TABLET    TK 1 T PO TID FOR 5 DAYS    AMLODIPINE (NORVASC) 5 MG TABLET    Take 1 tablet (5 mg total) by mouth 2 (two) times a day.    AMOXICILLIN-CLAVULANATE 875-125MG (AUGMENTIN) 875-125 MG PER TABLET    Take 1 tablet by mouth every 12 (twelve) hours.    BUPROPION (WELLBUTRIN XL) 150 MG TB24 TABLET    TAKE 1 TABLET(150 MG) BY MOUTH EVERY DAY    IBUPROFEN (ADVIL,MOTRIN) 600 MG TABLET        IBUPROFEN (ADVIL,MOTRIN) 800 MG TABLET    Take 1 tablet (800 mg total) by mouth every 8 (eight) hours as needed for Pain.    LOSARTAN (COZAAR) 25 MG TABLET    Take 1 tablet (25 mg total) by mouth once daily.    MOMETASONE 0.1% (ELOCON) 0.1 % CREAM    Apply topically once daily.    PANTOPRAZOLE (PROTONIX) 40 MG TABLET    Take 1 tablet (40 mg total) by mouth once daily.   Modified Medications    No medications on  "file   Discontinued Medications    No medications on file       Review of Systems   Constitutional:  Positive for malaise/fatigue.   HENT: Negative.     Eyes: Negative.    Respiratory: Negative.     Cardiovascular:  Positive for chest pain and leg swelling (rare).   Gastrointestinal: Negative.    Genitourinary: Negative.    Musculoskeletal: Negative.    Skin: Negative.    Neurological: Negative.    Endo/Heme/Allergies: Negative.    Psychiatric/Behavioral: Negative.     All 12 systems otherwise negative.    Wt Readings from Last 3 Encounters:   10/04/22 110.8 kg (244 lb 4.3 oz)   08/22/22 108.9 kg (240 lb)   07/26/22 109.2 kg (240 lb 11.9 oz)     Temp Readings from Last 3 Encounters:   08/22/22 99 °F (37.2 °C) (Tympanic)   07/26/22 98.1 °F (36.7 °C) (Temporal)   07/20/22 97.7 °F (36.5 °C)     BP Readings from Last 3 Encounters:   10/04/22 (!) 142/88   08/22/22 (!) 140/88   07/26/22 110/67     Pulse Readings from Last 3 Encounters:   10/04/22 100   08/22/22 102   07/26/22 73       BP (!) 142/88   Pulse 100   Ht 5' 5" (1.651 m)   Wt 110.8 kg (244 lb 4.3 oz)   SpO2 99%   BMI 40.65 kg/m²     Objective:   Physical Exam  Vitals and nursing note reviewed.   Constitutional:       General: She is not in acute distress.     Appearance: She is well-developed. She is obese. She is not diaphoretic.      Comments: Obese   HENT:      Head: Normocephalic and atraumatic.      Nose: Nose normal.   Eyes:      General: No scleral icterus.     Conjunctiva/sclera: Conjunctivae normal.   Neck:      Thyroid: No thyromegaly.      Vascular: No JVD.   Cardiovascular:      Rate and Rhythm: Normal rate and regular rhythm.      Heart sounds: S1 normal and S2 normal. No murmur heard.    No friction rub. No gallop. No S3 or S4 sounds.   Pulmonary:      Effort: Pulmonary effort is normal. No respiratory distress.      Breath sounds: Normal breath sounds. No stridor. No wheezing or rales.   Chest:      Chest wall: No tenderness.   Abdominal:     "  General: Bowel sounds are normal. There is no distension.      Palpations: Abdomen is soft. There is no mass.      Tenderness: There is no abdominal tenderness. There is no rebound.   Genitourinary:     Comments: Deferred  Musculoskeletal:         General: No tenderness or deformity. Normal range of motion.      Cervical back: Normal range of motion and neck supple.   Lymphadenopathy:      Cervical: No cervical adenopathy.   Skin:     General: Skin is warm and dry.      Coloration: Skin is not pale.      Findings: No erythema or rash.   Neurological:      Mental Status: She is alert and oriented to person, place, and time.      Motor: No abnormal muscle tone.      Coordination: Coordination normal.   Psychiatric:         Behavior: Behavior normal.         Thought Content: Thought content normal.         Judgment: Judgment normal.       Lab Results   Component Value Date     05/28/2021    K 4.4 05/28/2021     05/28/2021    CO2 27 05/28/2021    BUN 11 05/28/2021    CREATININE 0.8 05/28/2021    GLU 91 05/28/2021    HGBA1C 5.2 01/29/2019    AST 24 05/28/2021    ALT 44 05/28/2021    ALBUMIN 3.6 05/28/2021    PROT 7.4 05/28/2021    BILITOT 0.5 05/28/2021    WBC 7.22 01/29/2019    HGB 12.2 01/29/2019    HCT 39.7 01/29/2019    MCV 90 01/29/2019     (H) 01/29/2019    TSH 1.578 06/15/2017    CHOL 187 09/28/2021    HDL 41 09/28/2021    LDLCALC 126.0 09/28/2021    TRIG 100 09/28/2021     Assessment:      1. Essential hypertension    2. Obesity (BMI 30-39.9)    3. Mixed hyperlipidemia    4. Statin intolerance    5. CARMEN (obstructive sleep apnea)    6. Other irritable bowel syndrome    7. Gastroesophageal reflux disease, unspecified whether esophagitis present    8. History of COVID-19    9. Family history of early CAD    10. BMI 40.0-44.9, adult          Plan:   1. HTN - elevated   - cont meds  - changed benicar to losartan - increased to 50mg QHS, norvasc BID  - low salt diet   - weight loss  - not on birth  control    2. HLD with statin myopathy in the past   - cont Repatha  -may have back pain due to this - improving - held due to back pain  - could not tolerate Zetia or Simvastatin/pravasatin- had severe myalgias  - Lipids improved in Sept '21.      3. Insulin resistance - no longer based on recent A1c  - cont meds per PCP      4. Obesity BMI 40 - 244 lbs.   - need weight loss with diet/exercise   - refer to bariatric diet     5. Chest pain - atypical   - stress test - negative 2019  - 2 D ECHO - normal  - rare snoring/allergies     6. IBS  - cont tx per PCP  -neg Cscope     7. CARMEN, mild  - cont tx per pulm  - has been using Mouth guard    8. GERD - improved  - cont PPI, f/u GI  - has atypical CP  - may need EGD - low CV risk - pending    9. H/O COVID 19  - stable    Thank you for allowing me to participate in this patient's care. Please do not hesitate to contact me with any questions or concerns. Consult note has been forwarded to the referral physician.

## 2022-10-06 ENCOUNTER — OFFICE VISIT (OUTPATIENT)
Dept: INTERNAL MEDICINE | Facility: CLINIC | Age: 42
End: 2022-10-06
Payer: COMMERCIAL

## 2022-10-06 VITALS
TEMPERATURE: 97 F | DIASTOLIC BLOOD PRESSURE: 84 MMHG | WEIGHT: 243.81 LBS | BODY MASS INDEX: 40.62 KG/M2 | HEIGHT: 65 IN | HEART RATE: 92 BPM | SYSTOLIC BLOOD PRESSURE: 140 MMHG

## 2022-10-06 DIAGNOSIS — F41.1 GAD (GENERALIZED ANXIETY DISORDER): ICD-10-CM

## 2022-10-06 DIAGNOSIS — I10 HYPERTENSION, UNSPECIFIED TYPE: Primary | ICD-10-CM

## 2022-10-06 DIAGNOSIS — E78.49 FAMILIAL HYPERLIPIDEMIA: ICD-10-CM

## 2022-10-06 DIAGNOSIS — D64.9 ANEMIA, UNSPECIFIED TYPE: ICD-10-CM

## 2022-10-06 PROCEDURE — 3077F SYST BP >= 140 MM HG: CPT | Mod: CPTII,S$GLB,, | Performed by: FAMILY MEDICINE

## 2022-10-06 PROCEDURE — 1160F PR REVIEW ALL MEDS BY PRESCRIBER/CLIN PHARMACIST DOCUMENTED: ICD-10-PCS | Mod: CPTII,S$GLB,, | Performed by: FAMILY MEDICINE

## 2022-10-06 PROCEDURE — 99999 PR PBB SHADOW E&M-EST. PATIENT-LVL III: ICD-10-PCS | Mod: PBBFAC,,, | Performed by: FAMILY MEDICINE

## 2022-10-06 PROCEDURE — 99214 OFFICE O/P EST MOD 30 MIN: CPT | Mod: S$GLB,,, | Performed by: FAMILY MEDICINE

## 2022-10-06 PROCEDURE — 3077F PR MOST RECENT SYSTOLIC BLOOD PRESSURE >= 140 MM HG: ICD-10-PCS | Mod: CPTII,S$GLB,, | Performed by: FAMILY MEDICINE

## 2022-10-06 PROCEDURE — 3008F PR BODY MASS INDEX (BMI) DOCUMENTED: ICD-10-PCS | Mod: CPTII,S$GLB,, | Performed by: FAMILY MEDICINE

## 2022-10-06 PROCEDURE — 3044F HG A1C LEVEL LT 7.0%: CPT | Mod: CPTII,S$GLB,, | Performed by: FAMILY MEDICINE

## 2022-10-06 PROCEDURE — 3044F PR MOST RECENT HEMOGLOBIN A1C LEVEL <7.0%: ICD-10-PCS | Mod: CPTII,S$GLB,, | Performed by: FAMILY MEDICINE

## 2022-10-06 PROCEDURE — 3079F PR MOST RECENT DIASTOLIC BLOOD PRESSURE 80-89 MM HG: ICD-10-PCS | Mod: CPTII,S$GLB,, | Performed by: FAMILY MEDICINE

## 2022-10-06 PROCEDURE — 99999 PR PBB SHADOW E&M-EST. PATIENT-LVL III: CPT | Mod: PBBFAC,,, | Performed by: FAMILY MEDICINE

## 2022-10-06 PROCEDURE — 1160F RVW MEDS BY RX/DR IN RCRD: CPT | Mod: CPTII,S$GLB,, | Performed by: FAMILY MEDICINE

## 2022-10-06 PROCEDURE — 4010F ACE/ARB THERAPY RXD/TAKEN: CPT | Mod: CPTII,S$GLB,, | Performed by: FAMILY MEDICINE

## 2022-10-06 PROCEDURE — 4010F PR ACE/ARB THEARPY RXD/TAKEN: ICD-10-PCS | Mod: CPTII,S$GLB,, | Performed by: FAMILY MEDICINE

## 2022-10-06 PROCEDURE — 1159F MED LIST DOCD IN RCRD: CPT | Mod: CPTII,S$GLB,, | Performed by: FAMILY MEDICINE

## 2022-10-06 PROCEDURE — 1159F PR MEDICATION LIST DOCUMENTED IN MEDICAL RECORD: ICD-10-PCS | Mod: CPTII,S$GLB,, | Performed by: FAMILY MEDICINE

## 2022-10-06 PROCEDURE — 3008F BODY MASS INDEX DOCD: CPT | Mod: CPTII,S$GLB,, | Performed by: FAMILY MEDICINE

## 2022-10-06 PROCEDURE — 99214 PR OFFICE/OUTPT VISIT, EST, LEVL IV, 30-39 MIN: ICD-10-PCS | Mod: S$GLB,,, | Performed by: FAMILY MEDICINE

## 2022-10-06 PROCEDURE — 3079F DIAST BP 80-89 MM HG: CPT | Mod: CPTII,S$GLB,, | Performed by: FAMILY MEDICINE

## 2022-10-06 RX ORDER — BUSPIRONE HYDROCHLORIDE 10 MG/1
10 TABLET ORAL 2 TIMES DAILY
Qty: 60 TABLET | Refills: 6 | Status: SHIPPED | OUTPATIENT
Start: 2022-10-06 | End: 2023-12-09 | Stop reason: SDUPTHER

## 2022-10-06 NOTE — PROGRESS NOTES
"Subjective:      Patient ID: Niesha Abarca is a 42 y.o. female.    Chief Complaint: Anxiety and Hypertension     42 y.o.   female patient with a PMHx of HTN, HLD, and IBS presents to clinic for anxiety and HTN. The patient was last seen on 2022      Today she reports she is doing ok. Patient reports she seen Dr. Tirado on Tuesday (10/4/22) and Losartan was increased to 50 mg. She states she had an ER visit for elevated BP at 160/141. Patient reports she feels her anxiety is out of control with all of the things she have going on right now. She states she thinks this is the cause of the elevated BP with the life events going on. Patient report she had discontinued repatha for cholesterol, will contact Dr. Tirado to resume. Saw him a few days ago and her BP meds were adjusted.  Patient otherwise without complaints. Denies SOB, chest pain, and bowel changes.      Past Medical History:   Diagnosis Date    Anxiety     Essential hypertension 2019    Hyperlipidemia     IBS (irritable bowel syndrome)      Family History   Adopted: Yes   Problem Relation Age of Onset    No Known Problems Mother     No Known Problems Father     Breast cancer Paternal Grandmother      Past Surgical History:   Procedure Laterality Date    APPENDECTOMY       SECTION      COLONOSCOPY N/A 2022    Procedure: COLONOSCOPY;  Surgeon: Tosin Yoo MD;  Location: [facility];  Service: Endoscopy;  Laterality: N/A;    WISDOM TOOTH EXTRACTION       Social History     Tobacco Use    Smoking status: Former     Packs/day: 0.50     Years: 15.00     Pack years: 7.50     Types: Cigarettes     Quit date: 2018     Years since quittin.4    Smokeless tobacco: Never   Substance Use Topics    Alcohol use: Not Currently    Drug use: No       BP (!) 140/84   Pulse 92   Temp 97.1 °F (36.2 °C) (Temporal)   Ht 5' 5" (1.651 m)   Wt 110.6 kg (243 lb 13.3 oz)   BMI 40.58 kg/m²     Review of Systems   Constitutional:  Negative " for activity change, appetite change, chills, diaphoresis, fatigue, fever and unexpected weight change.   HENT:  Negative for congestion, ear pain, hearing loss, postnasal drip, rhinorrhea, sinus pain and sore throat.    Eyes:  Negative for pain, discharge, itching and visual disturbance.   Respiratory:  Negative for cough, chest tightness, shortness of breath and wheezing.    Cardiovascular:  Negative for chest pain, palpitations and leg swelling.   Gastrointestinal:  Negative for abdominal pain, constipation, diarrhea, nausea and vomiting.   Endocrine: Negative for polydipsia and polyuria.   Genitourinary:  Negative for difficulty urinating, dysuria, flank pain, frequency, menstrual problem, pelvic pain and urgency.   Musculoskeletal:  Negative for arthralgias, back pain, joint swelling, myalgias and neck pain.   Skin:  Negative for color change and rash.   Neurological:  Negative for dizziness, weakness, light-headedness and headaches.   Hematological:  Negative for adenopathy.   Psychiatric/Behavioral:  Negative for confusion, decreased concentration and dysphoric mood. The patient is nervous/anxious.      Objective:     Physical Exam  Vitals and nursing note reviewed.   Constitutional:       General: She is not in acute distress.  Eyes:      Conjunctiva/sclera: Conjunctivae normal.      Pupils: Pupils are equal, round, and reactive to light.   Cardiovascular:      Rate and Rhythm: Normal rate and regular rhythm.      Heart sounds: Normal heart sounds.   Pulmonary:      Effort: Pulmonary effort is normal. No respiratory distress.      Breath sounds: Normal breath sounds. No wheezing or rales.   Abdominal:      General: Bowel sounds are normal. There is no distension.      Palpations: Abdomen is soft.      Tenderness: There is no abdominal tenderness. There is no guarding.   Musculoskeletal:      Cervical back: Normal range of motion and neck supple.   Skin:     General: Skin is warm and dry.      Findings: No  rash.   Neurological:      Mental Status: She is alert and oriented to person, place, and time.   Psychiatric:         Behavior: Behavior normal.         Thought Content: Thought content normal.         Judgment: Judgment normal.       Lab Results   Component Value Date    WBC 6.18 10/04/2022    HGB 11.7 (L) 10/04/2022    HCT 38.8 10/04/2022     10/04/2022    CHOL 277 (H) 10/04/2022    TRIG 85 10/04/2022    HDL 51 10/04/2022    ALT 22 10/04/2022    AST 12 10/04/2022     10/04/2022    K 4.3 10/04/2022     10/04/2022    CREATININE 0.7 10/04/2022    BUN 16 10/04/2022    CO2 24 10/04/2022    TSH 1.568 10/04/2022    HGBA1C 5.4 10/04/2022       Assessment:     1. Hypertension, unspecified type    2. Familial hyperlipidemia    3. Anemia, unspecified type    4. NATHAN (generalized anxiety disorder)         Plan:     Hypertension, unspecified type    Familial hyperlipidemia    Anemia, unspecified type    NATHAN (generalized anxiety disorder)    Other orders  -     busPIRone (BUSPAR) 10 MG tablet; Take 1 tablet (10 mg total) by mouth 2 (two) times daily.  Dispense: 60 tablet; Refill: 6      Vitals reviewed. BP elevated at 140/84.  Take meds//suspect anxiety is provoking some of the BP elevation.  Anxiety//chronic//uncontrolled  Labs reviewed and discussed. Cholesterol elevated at 277.  Pt to discuss restarting Repatha with Dr. Tirado  Patient is encouraged to include a multi-vitamin to daily regimen for depleted iron levels.   Will add Buspar 10 mg to current regimen to assist with the mood.  Patient overall doing well.  Questions and concerns addressed.          Documentation entered by Aubrie Mclaughlin, acting as scribe for Dr. Kuldip Fontanez. 10/06/2022 8:29 AM.

## 2022-12-15 ENCOUNTER — OFFICE VISIT (OUTPATIENT)
Dept: URGENT CARE | Facility: CLINIC | Age: 42
End: 2022-12-15
Payer: COMMERCIAL

## 2022-12-15 VITALS
SYSTOLIC BLOOD PRESSURE: 136 MMHG | HEART RATE: 118 BPM | TEMPERATURE: 100 F | RESPIRATION RATE: 18 BRPM | BODY MASS INDEX: 40.48 KG/M2 | DIASTOLIC BLOOD PRESSURE: 89 MMHG | HEIGHT: 65 IN | WEIGHT: 243 LBS | OXYGEN SATURATION: 100 %

## 2022-12-15 DIAGNOSIS — J02.9 SORE THROAT: ICD-10-CM

## 2022-12-15 DIAGNOSIS — J02.0 STREP PHARYNGITIS: Primary | ICD-10-CM

## 2022-12-15 LAB
CTP QC/QA: YES
MOLECULAR STREP A: POSITIVE

## 2022-12-15 PROCEDURE — 99213 PR OFFICE/OUTPT VISIT, EST, LEVL III, 20-29 MIN: ICD-10-PCS | Mod: S$GLB,,,

## 2022-12-15 PROCEDURE — 4010F PR ACE/ARB THEARPY RXD/TAKEN: ICD-10-PCS | Mod: CPTII,S$GLB,,

## 2022-12-15 PROCEDURE — 3008F PR BODY MASS INDEX (BMI) DOCUMENTED: ICD-10-PCS | Mod: CPTII,S$GLB,,

## 2022-12-15 PROCEDURE — 3044F PR MOST RECENT HEMOGLOBIN A1C LEVEL <7.0%: ICD-10-PCS | Mod: CPTII,S$GLB,,

## 2022-12-15 PROCEDURE — 1160F PR REVIEW ALL MEDS BY PRESCRIBER/CLIN PHARMACIST DOCUMENTED: ICD-10-PCS | Mod: CPTII,S$GLB,,

## 2022-12-15 PROCEDURE — 3008F BODY MASS INDEX DOCD: CPT | Mod: CPTII,S$GLB,,

## 2022-12-15 PROCEDURE — 4010F ACE/ARB THERAPY RXD/TAKEN: CPT | Mod: CPTII,S$GLB,,

## 2022-12-15 PROCEDURE — 87651 STREP A DNA AMP PROBE: CPT | Mod: QW,S$GLB,,

## 2022-12-15 PROCEDURE — 3075F PR MOST RECENT SYSTOLIC BLOOD PRESS GE 130-139MM HG: ICD-10-PCS | Mod: CPTII,S$GLB,,

## 2022-12-15 PROCEDURE — 1159F MED LIST DOCD IN RCRD: CPT | Mod: CPTII,S$GLB,,

## 2022-12-15 PROCEDURE — 87651 POCT STREP A MOLECULAR: ICD-10-PCS | Mod: QW,S$GLB,,

## 2022-12-15 PROCEDURE — 1159F PR MEDICATION LIST DOCUMENTED IN MEDICAL RECORD: ICD-10-PCS | Mod: CPTII,S$GLB,,

## 2022-12-15 PROCEDURE — 1160F RVW MEDS BY RX/DR IN RCRD: CPT | Mod: CPTII,S$GLB,,

## 2022-12-15 PROCEDURE — 3075F SYST BP GE 130 - 139MM HG: CPT | Mod: CPTII,S$GLB,,

## 2022-12-15 PROCEDURE — 3044F HG A1C LEVEL LT 7.0%: CPT | Mod: CPTII,S$GLB,,

## 2022-12-15 PROCEDURE — 3079F PR MOST RECENT DIASTOLIC BLOOD PRESSURE 80-89 MM HG: ICD-10-PCS | Mod: CPTII,S$GLB,,

## 2022-12-15 PROCEDURE — 3079F DIAST BP 80-89 MM HG: CPT | Mod: CPTII,S$GLB,,

## 2022-12-15 PROCEDURE — 99213 OFFICE O/P EST LOW 20 MIN: CPT | Mod: S$GLB,,,

## 2022-12-15 RX ORDER — AMOXICILLIN 500 MG/1
500 TABLET, FILM COATED ORAL EVERY 12 HOURS
Qty: 20 TABLET | Refills: 0 | Status: SHIPPED | OUTPATIENT
Start: 2022-12-15 | End: 2022-12-25

## 2022-12-15 NOTE — LETTER
December 15, 2022      [facility]  [address]  [place] [address]  Phone: [phone]       Patient: Niesha Abarca   YOB: 1980  Date of Visit: 12/15/2022    To Whom It May Concern:    Tiffany Abarca  was at [facility] on 12/15/2022. The patient may return to work/school on 12/19/2022 with no restrictions. If you have any questions or concerns, or if I can be of further assistance, please do not hesitate to contact me.    Sincerely,    Lesley Tejeda PA-C

## 2022-12-16 NOTE — PROGRESS NOTES
"Subjective:       Patient ID: Niesha Abarca is a 42 y.o. female.    Vitals:  height is 5' 5" (1.651 m) and weight is 110.2 kg (243 lb). Her tympanic temperature is 100 °F (37.8 °C). Her blood pressure is 136/89 and her pulse is 118 (abnormal). Her respiration is 18 and oxygen saturation is 100%.     Chief Complaint: Sore Throat (Headache and ear pain)    Niesha Abarca is a 42 y.o. female who presents for sore throat which onset this morning. Associated sxs include HA, chills, trouble swallowing, R ear pain, and swollen glands. Patient denies any fever, SOB, CP, abd pain, n/v/d, rash, dizziness, or numbness/tingling. Prior Tx includes Mucinex with no relief.    Sore Throat   This is a new problem. The current episode started today. The problem has been gradually worsening. The pain is worse on the right side. There has been no fever. The pain is at a severity of 6/10. The pain is moderate. Associated symptoms include ear pain, headaches, swollen glands and trouble swallowing. Pertinent negatives include no abdominal pain, congestion, coughing, diarrhea, ear discharge, shortness of breath or vomiting. She has had no exposure to strep or mono. Treatments tried: Mucinex. The treatment provided no relief.     Constitution: Positive for chills. Negative for appetite change, sweating, fatigue and fever.   HENT:  Positive for ear pain, sore throat and trouble swallowing. Negative for ear discharge, hearing loss, congestion, postnasal drip, sinus pain and sinus pressure.    Cardiovascular:  Negative for chest pain.   Respiratory:  Negative for cough, sputum production and shortness of breath.    Gastrointestinal:  Negative for abdominal pain, nausea, vomiting and diarrhea.   Musculoskeletal:  Negative for muscle ache.   Neurological:  Positive for headaches. Negative for dizziness, numbness and tingling.     Objective:      Physical Exam   Constitutional: She is oriented to person, place, and time. She " appears well-developed. She is cooperative.  Non-toxic appearance. She does not appear ill. No distress.   HENT:   Head: Normocephalic and atraumatic.   Ears:   Right Ear: Hearing, tympanic membrane, external ear and ear canal normal.   Left Ear: Hearing, tympanic membrane, external ear and ear canal normal.   Nose: Nose normal. No mucosal edema or nasal deformity. No epistaxis. Right sinus exhibits no maxillary sinus tenderness and no frontal sinus tenderness. Left sinus exhibits no maxillary sinus tenderness and no frontal sinus tenderness.   Mouth/Throat: Uvula is midline and mucous membranes are normal. Mucous membranes are moist. No trismus in the jaw. Normal dentition. No uvula swelling. Oropharyngeal exudate and posterior oropharyngeal erythema present. No posterior oropharyngeal edema.   Eyes: Conjunctivae and lids are normal. No scleral icterus. Extraocular movement intact   Neck: Trachea normal and phonation normal. Neck supple. No edema present. No erythema present. No neck rigidity present.   Cardiovascular: Normal rate, regular rhythm, normal heart sounds and normal pulses.   Pulmonary/Chest: Effort normal and breath sounds normal. No stridor. No respiratory distress. She has no decreased breath sounds. She has no wheezes. She has no rhonchi. She has no rales.   Abdominal: Normal appearance.   Musculoskeletal: Normal range of motion.         General: No deformity. Normal range of motion.   Neurological: She is alert and oriented to person, place, and time. She exhibits normal muscle tone. Coordination normal.   Skin: Skin is warm, dry, intact, not diaphoretic and not pale.   Psychiatric: Her speech is normal and behavior is normal. Judgment and thought content normal.   Nursing note and vitals reviewed.      Assessment:       1. Strep pharyngitis    2. Sore throat          Results for orders placed or performed in visit on 12/15/22   POCT Strep A, Molecular   Result Value Ref Range    Molecular Strep A,  POC Positive (A) Negative     Acceptable Yes        Plan:         Strep pharyngitis  -     amoxicillin (AMOXIL) 500 MG Tab; Take 1 tablet (500 mg total) by mouth every 12 (twelve) hours. for 10 days  Dispense: 20 tablet; Refill: 0    Sore throat  -     POCT Strep A, Molecular    Reviewed positive strep test with patient who verbalized understanding.  We discussed treatment with an antibiotic to cover the strep throat infection as well as magic mouthwash to cover sore throat symptoms.  We also discussed at home remedies to help with current symptoms and over-the-counter medications that can also help with diagnosis.  Patient educational handouts also included with discharge instructions for patient who verbalized understanding agrees with plan of care.  Patient denies any further questions or concerns at this time.  Patient exits exam room in no acute distress.    Patient Instructions   Strep Throat  If your condition worsens or fails to improve we recommend that you receive another evaluation at the ER immediately or return here. You must understand that you've received an urgent care treatment only and that you may be released before all your medical problems are known or treated. You the patient will arrange for followup care as instructed.   Your Rapid Strep Test was POSITIVE.  Cool liquids as much as possible.  Avoid any foods or beverages that may cause irritation to the throat (spicy, acidic, rough)  Tylenol or ibuprofen for fever or pain as directed.  4000 mg/day MAX of tylenol. Can take up to 800 mg of Ibuprofen three times a day.  Rest is important.   Complete full course of antibiotics.  Use a new toothbrush now and another new toothbrush after completion of antibiotics.  Follow up in the ER for any worsening of symptoms such as increase in throat pain, trouble breathing or speaking, shortness of breath, neck stiffness, ear pain, increased tiredness, ect.     Parents verbalizes understanding  and agrees with plan of care.

## 2022-12-16 NOTE — PATIENT INSTRUCTIONS
Strep Throat  If your condition worsens or fails to improve we recommend that you receive another evaluation at the ER immediately or return here. You must understand that you've received an urgent care treatment only and that you may be released before all your medical problems are known or treated. You the patient will arrange for followup care as instructed.   Your Rapid Strep Test was POSITIVE.  Cool liquids as much as possible.  Avoid any foods or beverages that may cause irritation to the throat (spicy, acidic, rough)  Tylenol or ibuprofen for fever or pain as directed.  4000 mg/day MAX of tylenol. Can take up to 800 mg of Ibuprofen three times a day.  Rest is important.   Complete full course of antibiotics.  Use a new toothbrush now and another new toothbrush after completion of antibiotics.  Follow up in the ER for any worsening of symptoms such as increase in throat pain, trouble breathing or speaking, shortness of breath, neck stiffness, ear pain, increased tiredness, ect.     Parents verbalizes understanding and agrees with plan of care.

## 2023-01-02 ENCOUNTER — PATIENT MESSAGE (OUTPATIENT)
Dept: INTERNAL MEDICINE | Facility: CLINIC | Age: 43
End: 2023-01-02
Payer: COMMERCIAL

## 2023-02-05 ENCOUNTER — PATIENT MESSAGE (OUTPATIENT)
Dept: DERMATOLOGY | Facility: CLINIC | Age: 43
End: 2023-02-05
Payer: COMMERCIAL

## 2023-02-06 ENCOUNTER — PATIENT MESSAGE (OUTPATIENT)
Dept: INTERNAL MEDICINE | Facility: CLINIC | Age: 43
End: 2023-02-06

## 2023-02-06 ENCOUNTER — OFFICE VISIT (OUTPATIENT)
Dept: INTERNAL MEDICINE | Facility: CLINIC | Age: 43
End: 2023-02-06
Payer: COMMERCIAL

## 2023-02-06 DIAGNOSIS — B00.1 FEVER BLISTER: Primary | ICD-10-CM

## 2023-02-06 PROCEDURE — 1159F MED LIST DOCD IN RCRD: CPT | Mod: CPTII,95,, | Performed by: FAMILY MEDICINE

## 2023-02-06 PROCEDURE — 4010F PR ACE/ARB THEARPY RXD/TAKEN: ICD-10-PCS | Mod: CPTII,95,, | Performed by: FAMILY MEDICINE

## 2023-02-06 PROCEDURE — 4010F ACE/ARB THERAPY RXD/TAKEN: CPT | Mod: CPTII,95,, | Performed by: FAMILY MEDICINE

## 2023-02-06 PROCEDURE — 99214 PR OFFICE/OUTPT VISIT, EST, LEVL IV, 30-39 MIN: ICD-10-PCS | Mod: 95,,, | Performed by: FAMILY MEDICINE

## 2023-02-06 PROCEDURE — 1160F RVW MEDS BY RX/DR IN RCRD: CPT | Mod: CPTII,95,, | Performed by: FAMILY MEDICINE

## 2023-02-06 PROCEDURE — 99214 OFFICE O/P EST MOD 30 MIN: CPT | Mod: 95,,, | Performed by: FAMILY MEDICINE

## 2023-02-06 PROCEDURE — 1159F PR MEDICATION LIST DOCUMENTED IN MEDICAL RECORD: ICD-10-PCS | Mod: CPTII,95,, | Performed by: FAMILY MEDICINE

## 2023-02-06 PROCEDURE — 1160F PR REVIEW ALL MEDS BY PRESCRIBER/CLIN PHARMACIST DOCUMENTED: ICD-10-PCS | Mod: CPTII,95,, | Performed by: FAMILY MEDICINE

## 2023-02-06 RX ORDER — VALACYCLOVIR HYDROCHLORIDE 1 G/1
2000 TABLET, FILM COATED ORAL EVERY 12 HOURS
Qty: 8 TABLET | Refills: 1 | Status: SHIPPED | OUTPATIENT
Start: 2023-02-06 | End: 2023-07-21 | Stop reason: SDUPTHER

## 2023-02-06 NOTE — PROGRESS NOTES
Subjective:      Patient ID: Niesha Abarca is a 42 y.o. female.    Chief Complaint: Recurring Fever Blister    HPI 42 y.o.   female patient with a PMHx of HTN, HLD , and IBS presents to clinic for Telemedicine Visit. The patient was last seen on 2022      Today she reports recurring fever blisters. Patient otherwise without complaints.     The patient location is: At work  The chief complaint leading to consultation is: Recurring Fever Blisters    Visit type: audiovisual     Face to Face time with patient: 10 mins   minutes of total time spent on the encounter, which includes face to face time and non-face to face time preparing to see the patient (eg, review of tests), Obtaining and/or reviewing separately obtained history, Documenting clinical information in the electronic or other health record, Independently interpreting results (not separately reported) and communicating results to the patient/family/caregiver, or Care coordination (not separately reported).            Each patient to whom he or she provides medical services by telemedicine is:  (1) informed of the relationship between the physician and patient and the respective role of any other health care provider with respect to management of the patient; and (2) notified that he or she may decline to receive medical services by telemedicine and may withdraw from such care at any time.     Notes:     Past Medical History:   Diagnosis Date    Anxiety     Essential hypertension 2019    Hyperlipidemia     IBS (irritable bowel syndrome)      Family History   Adopted: Yes   Problem Relation Age of Onset    No Known Problems Mother     No Known Problems Father     Breast cancer Paternal Grandmother      Past Surgical History:   Procedure Laterality Date    APPENDECTOMY       SECTION      COLONOSCOPY N/A 2022    Procedure: COLONOSCOPY;  Surgeon: Tosin Yoo MD;  Location: [facility];  Service: Endoscopy;  Laterality: N/A;     WISDOM TOOTH EXTRACTION       Social History     Tobacco Use    Smoking status: Former     Packs/day: 0.50     Years: 15.00     Pack years: 7.50     Types: Cigarettes     Quit date: 2018     Years since quittin.7    Smokeless tobacco: Never   Substance Use Topics    Alcohol use: Not Currently    Drug use: No       There were no vitals taken for this visit.    Review of Systems   Constitutional:  Negative for activity change, appetite change, chills, diaphoresis, fatigue, fever and unexpected weight change.   HENT:  Negative for congestion, ear pain, hearing loss, postnasal drip, rhinorrhea, sinus pain, sore throat and trouble swallowing.    Eyes:  Negative for pain, discharge, itching and visual disturbance.   Respiratory:  Negative for cough, chest tightness, shortness of breath and wheezing.    Cardiovascular:  Negative for chest pain, palpitations and leg swelling.   Gastrointestinal:  Negative for abdominal pain, blood in stool, constipation, diarrhea, nausea and vomiting.   Endocrine: Negative for polydipsia and polyuria.   Genitourinary:  Negative for difficulty urinating, dysuria, flank pain, frequency, hematuria, menstrual problem, pelvic pain and urgency.   Musculoskeletal:  Negative for arthralgias, back pain, joint swelling, myalgias and neck pain.   Skin:  Negative for color change and rash.   Neurological:  Negative for dizziness, weakness, light-headedness and headaches.   Hematological:  Negative for adenopathy.   Psychiatric/Behavioral:  Negative for confusion, decreased concentration and dysphoric mood.      Objective:     Physical Exam  Constitutional:       General: She is not in acute distress.     Appearance: She is not diaphoretic.   HENT:      Head: Normocephalic and atraumatic.   Eyes:      General:         Right eye: No discharge.         Left eye: No discharge.   Pulmonary:      Effort: Pulmonary effort is normal. No respiratory distress.   Neurological:      Mental Status: She  is oriented to person, place, and time.   Psychiatric:         Mood and Affect: Mood normal.         Behavior: Behavior normal.         Thought Content: Thought content normal.         Judgment: Judgment normal.       Lab Results   Component Value Date    WBC 6.18 10/04/2022    HGB 11.7 (L) 10/04/2022    HCT 38.8 10/04/2022     10/04/2022    CHOL 277 (H) 10/04/2022    TRIG 85 10/04/2022    HDL 51 10/04/2022    ALT 22 10/04/2022    AST 12 10/04/2022     10/04/2022    K 4.3 10/04/2022     10/04/2022    CREATININE 0.7 10/04/2022    BUN 16 10/04/2022    CO2 24 10/04/2022    TSH 1.568 10/04/2022    HGBA1C 5.4 10/04/2022       Assessment:     1. Fever blister         Plan:     Fever blister    Other orders  -     valACYclovir (VALTREX) 1000 MG tablet; Take 2 tablets (2,000 mg total) by mouth every 12 (twelve) hours. for 2 days  Dispense: 8 tablet; Refill: 1        Chronic issue/flared up  Will send in Valtrex for fever blisters  Consider injectable med for weight loss once supply better  F/u PRN      Documentation entered by Aubrie Mclaughlin, acting as scribe for Dr. Kuldip Fontanez. 02/06/2023 3:04 PM.

## 2023-03-20 ENCOUNTER — TELEPHONE (OUTPATIENT)
Dept: SURGERY | Facility: CLINIC | Age: 43
End: 2023-03-20
Payer: COMMERCIAL

## 2023-03-20 NOTE — TELEPHONE ENCOUNTER
Left vm for patient to return phone call to office regarding 3/24 appt. Was calling to see if patient would be interested in scheduling a Breast MRI, and if so, can coordinate with appt.

## 2023-03-20 NOTE — PROGRESS NOTES
Breast Surgery  Department of General Surgery   [facility]    Date of Service: 03/20/2023    DIAGNOSIS:   This is a 42 y.o. female with lifetime risk of breast cancer >20%.   Genetic testing: none - referral placed gain for evaluation    HISTORY OF PRESENT ILLNESS:   Niesha Abarca is a 42 y.o. female comes in for high-risk follow up.  She denies change in her breast self-exam specifically denying new masses, skin or nipple changes, or nipple discharge. Past medical and surgical history is updated with no new changes. There have been no changes to family history. The patient denies constitutional symptoms of night sweats, weight loss, new headaches, visual changes, new back or bony pain, chest pain, or shortness of breath.      IMAGING:   MRI: pending - planned for May  MMG:   Results for orders placed during the hospital encounter of 09/23/22    Mammo Digital Screening Bilat w/ Chuy    Narrative  Result:  Mammo Digital Screening Bilat w/ Chuy    History:  Patient is 42 y.o. and is seen for a screening mammogram.      Films Compared:  Prior images (if available) were compared.    Findings:  This procedure was performed using tomosynthesis.  Computer-aided detection was utilized in the interpretation of this examination.    The breasts have scattered areas of fibroglandular density. There is no evidence of suspicious masses, microcalcifications or architectural distortion.    Impression  No mammographic evidence of malignancy.    BI-RADS Category 1: Negative    Recommendation:  Routine screening mammogram in 1 year is recommended.    Your estimated lifetime risk of breast cancer (to age 85) based on Tyrer-Cuzick risk assessment model is 21.02 %.  According to the American [employer] Society, patients with a lifetime breast cancer risk of 20% or higher might benefit from supplemental screening tests. ??      PHYSICAL EXAM:   General: The patient appears well and is in no acute distress.   BREAST EXAM  No  Asymmetry  Right:  - Mass: No  - Skin change: No  - Nipple Discharge: No  - Nipple retraction: No  - Axillary LAD: No  Left:   - Mass: No  - Skin change: No  - Nipple Discharge: No  - Nipple retraction: No  - Axillary LAD: No    ASSESSMENT:   This is a 42 y.o. female at high risk of breast cancer     PLAN:   Niesha Abarca has a normal breast exam today.   Continue self breast awareness. We discussed the possible signs and symptoms of breast cancer as lump, masses, new asymmetries, skin changes and nipple changes. She is encouraged to contact me if any new breast concerns arise.  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  Return to clinic in March. The patient is in agreement with the plan. Questions were encouraged and answered to patient's satisfaction. Niesha will call our office with any questions or concerns.     I spent a total of 25 minutes on this visit. This includes face to face time and non-face to face time preparing to see the patient (eg, review of tests), obtaining and/or reviewing separately obtained history, documenting clinical information in the electronic or other health record, independently interpreting results and communicating results to the patient/family/caregiver, or care coordinator.      Briana Devi

## 2023-03-24 ENCOUNTER — TELEPHONE (OUTPATIENT)
Dept: SURGERY | Facility: CLINIC | Age: 43
End: 2023-03-24

## 2023-03-24 ENCOUNTER — OFFICE VISIT (OUTPATIENT)
Dept: SURGERY | Facility: CLINIC | Age: 43
End: 2023-03-24
Payer: COMMERCIAL

## 2023-03-24 DIAGNOSIS — Z91.89 AT HIGH RISK FOR BREAST CANCER: Primary | ICD-10-CM

## 2023-03-24 DIAGNOSIS — Z12.31 ENCOUNTER FOR SCREENING MAMMOGRAM FOR BREAST CANCER: ICD-10-CM

## 2023-03-24 PROCEDURE — 4010F ACE/ARB THERAPY RXD/TAKEN: CPT | Mod: CPTII,S$GLB,, | Performed by: SURGERY

## 2023-03-24 PROCEDURE — 99999 PR PBB SHADOW E&M-EST. PATIENT-LVL II: CPT | Mod: PBBFAC,,, | Performed by: SURGERY

## 2023-03-24 PROCEDURE — 99999 PR PBB SHADOW E&M-EST. PATIENT-LVL II: ICD-10-PCS | Mod: PBBFAC,,, | Performed by: SURGERY

## 2023-03-24 PROCEDURE — 4010F PR ACE/ARB THEARPY RXD/TAKEN: ICD-10-PCS | Mod: CPTII,S$GLB,, | Performed by: SURGERY

## 2023-03-24 PROCEDURE — 99213 OFFICE O/P EST LOW 20 MIN: CPT | Mod: S$GLB,,, | Performed by: SURGERY

## 2023-03-24 PROCEDURE — 99213 PR OFFICE/OUTPT VISIT, EST, LEVL III, 20-29 MIN: ICD-10-PCS | Mod: S$GLB,,, | Performed by: SURGERY

## 2023-04-18 ENCOUNTER — OFFICE VISIT (OUTPATIENT)
Dept: URGENT CARE | Facility: CLINIC | Age: 43
End: 2023-04-18
Payer: COMMERCIAL

## 2023-04-18 VITALS
RESPIRATION RATE: 14 BRPM | OXYGEN SATURATION: 100 % | TEMPERATURE: 98 F | BODY MASS INDEX: 38.57 KG/M2 | DIASTOLIC BLOOD PRESSURE: 88 MMHG | SYSTOLIC BLOOD PRESSURE: 151 MMHG | HEIGHT: 66 IN | WEIGHT: 240 LBS | HEART RATE: 96 BPM

## 2023-04-18 DIAGNOSIS — J01.90 ACUTE BACTERIAL SINUSITIS: Primary | ICD-10-CM

## 2023-04-18 DIAGNOSIS — B96.89 ACUTE BACTERIAL SINUSITIS: Primary | ICD-10-CM

## 2023-04-18 DIAGNOSIS — H10.32 ACUTE BACTERIAL CONJUNCTIVITIS OF LEFT EYE: ICD-10-CM

## 2023-04-18 DIAGNOSIS — I10 ESSENTIAL HYPERTENSION: ICD-10-CM

## 2023-04-18 DIAGNOSIS — R09.81 SINUS CONGESTION: ICD-10-CM

## 2023-04-18 LAB
CTP QC/QA: YES
SARS-COV-2 AG RESP QL IA.RAPID: NEGATIVE

## 2023-04-18 PROCEDURE — 99214 OFFICE O/P EST MOD 30 MIN: CPT | Mod: S$GLB,,, | Performed by: NURSE PRACTITIONER

## 2023-04-18 PROCEDURE — 87811 SARS-COV-2 COVID19 W/OPTIC: CPT | Mod: QW,S$GLB,, | Performed by: NURSE PRACTITIONER

## 2023-04-18 PROCEDURE — 99214 PR OFFICE/OUTPT VISIT, EST, LEVL IV, 30-39 MIN: ICD-10-PCS | Mod: S$GLB,,, | Performed by: NURSE PRACTITIONER

## 2023-04-18 PROCEDURE — 87811 SARS CORONAVIRUS 2 ANTIGEN POCT, MANUAL READ: ICD-10-PCS | Mod: QW,S$GLB,, | Performed by: NURSE PRACTITIONER

## 2023-04-18 RX ORDER — DOXYLAMINE SUCCINATE AND PHENYLEPHRINE HYDROCHLORIDE 10.5; 1 MG/1; MG/1
1 TABLET ORAL
Qty: 15 TABLET | Refills: 0 | Status: SHIPPED | OUTPATIENT
Start: 2023-04-18 | End: 2023-12-21

## 2023-04-18 RX ORDER — OFLOXACIN 3 MG/ML
1 SOLUTION/ DROPS OPHTHALMIC 4 TIMES DAILY
Qty: 5 ML | Refills: 0 | Status: SHIPPED | OUTPATIENT
Start: 2023-04-18 | End: 2023-04-25

## 2023-04-18 RX ORDER — FLUTICASONE PROPIONATE 50 MCG
1 SPRAY, SUSPENSION (ML) NASAL DAILY
Qty: 16 ML | Refills: 0 | Status: SHIPPED | OUTPATIENT
Start: 2023-04-18 | End: 2023-12-21

## 2023-04-18 RX ORDER — AMOXICILLIN AND CLAVULANATE POTASSIUM 875; 125 MG/1; MG/1
1 TABLET, FILM COATED ORAL 2 TIMES DAILY
Qty: 14 TABLET | Refills: 0 | Status: SHIPPED | OUTPATIENT
Start: 2023-04-18 | End: 2023-04-25

## 2023-04-18 NOTE — PROGRESS NOTES
"Subjective:      Patient ID: Niesha Abarca is a 42 y.o. female.    Vitals:  height is 5' 5.5" (1.664 m) and weight is 108.9 kg (240 lb). Her temperature is 98.1 °F (36.7 °C). Her blood pressure is 151/88 (abnormal) and her pulse is 96. Her respiration is 14 and oxygen saturation is 100%.     Chief Complaint: Sinus Problem    Niesha Abarca is a 42 year old female whom presents  today with sinus congestion, sinus pressure and fatigue x 6 days. She states that is got really bad yesterday. She also c/o left eye irritation and discharge since yesterday as well. She denies any exposure to any ill contacts.    Sinus Problem  This is a new problem. The current episode started in the past 7 days. The problem has been gradually worsening since onset. There has been no fever. Her pain is at a severity of 5/10. The pain is moderate. Associated symptoms include chills, congestion, headaches, a hoarse voice, sinus pressure, sneezing and a sore throat. Pertinent negatives include no coughing, diaphoresis, ear pain, neck pain, shortness of breath or swollen glands. Past treatments include acetaminophen (Tylenol sinus). The treatment provided mild relief.     Constitution: Positive for chills. Negative for sweating.   HENT:  Positive for congestion, sinus pressure and sore throat. Negative for ear pain.    Neck: Negative for neck pain.   Respiratory:  Negative for cough and shortness of breath.    Allergic/Immunologic: Positive for sneezing.   Neurological:  Positive for headaches.    Objective:     Physical Exam   Constitutional: She is oriented to person, place, and time. She appears well-developed. She is cooperative.  Non-toxic appearance. She does not appear ill. No distress.   HENT:   Head: Normocephalic and atraumatic.   Ears:   Right Ear: Hearing, tympanic membrane, external ear and ear canal normal.   Left Ear: Hearing, tympanic membrane, external ear and ear canal normal.   Nose: Nose normal. No mucosal " edema, rhinorrhea or nasal deformity. No epistaxis. Right sinus exhibits no maxillary sinus tenderness and no frontal sinus tenderness. Left sinus exhibits no maxillary sinus tenderness and no frontal sinus tenderness.   Mouth/Throat: Uvula is midline, oropharynx is clear and moist and mucous membranes are normal. No trismus in the jaw. Normal dentition. No uvula swelling. No oropharyngeal exudate, posterior oropharyngeal edema or posterior oropharyngeal erythema.   Eyes: Conjunctivae and lids are normal. No scleral icterus.   Neck: Trachea normal and phonation normal. Neck supple. No edema present. No erythema present. No neck rigidity present.   Cardiovascular: Normal rate, regular rhythm, normal heart sounds and normal pulses.   Pulmonary/Chest: Effort normal and breath sounds normal. No respiratory distress. She has no decreased breath sounds. She has no rhonchi.   Abdominal: Normal appearance.   Musculoskeletal: Normal range of motion.         General: No deformity. Normal range of motion.   Neurological: She is alert and oriented to person, place, and time. She exhibits normal muscle tone. Coordination normal.   Skin: Skin is warm, dry, intact, not diaphoretic and not pale. Capillary refill takes less than 2 seconds.   Psychiatric: Her speech is normal and behavior is normal. Judgment and thought content normal.   Nursing note and vitals reviewed.    Assessment:     1. Acute bacterial sinusitis    2. Acute bacterial conjunctivitis of left eye    3. Sinus congestion    4. Essential hypertension        Plan:       Acute bacterial sinusitis  -     doxylamine-phenylephrine (POLY HIST FORTE) 10.5-10 mg Tab; Take 1 tablet by mouth every 6 to 8 hours as needed (Cough/congestion).  Dispense: 15 tablet; Refill: 0  -     amoxicillin-clavulanate 875-125mg (AUGMENTIN) 875-125 mg per tablet; Take 1 tablet by mouth 2 (two) times daily. for 7 days  Dispense: 14 tablet; Refill: 0  -     fluticasone propionate (FLONASE) 50  mcg/actuation nasal spray; 1 spray (50 mcg total) by Each Nostril route once daily.  Dispense: 16 mL; Refill: 0    Acute bacterial conjunctivitis of left eye  -     ofloxacin (OCUFLOX) 0.3 % ophthalmic solution; Place 1 drop into both eyes 4 (four) times daily. for 7 days  Dispense: 5 mL; Refill: 0    Sinus congestion  -     SARS Coronavirus 2 Antigen, POCT Manual Read  -     doxylamine-phenylephrine (POLY HIST FORTE) 10.5-10 mg Tab; Take 1 tablet by mouth every 6 to 8 hours as needed (Cough/congestion).  Dispense: 15 tablet; Refill: 0  -     fluticasone propionate (FLONASE) 50 mcg/actuation nasal spray; 1 spray (50 mcg total) by Each Nostril route once daily.  Dispense: 16 mL; Refill: 0    Essential hypertension          Medical Decision Making:   Differential Diagnosis:   Bronchitis, COPD/Asthma exacerbation, Viral upper respiratory infection, Bacterial upper respiratory infection, Pneumonia, covid19, influenza,bacterial vs viral sinusitis.    Clinical Tests:   Lab Tests: Ordered and Reviewed       <> Summary of Lab: Covid negative  Urgent Care Management:  Previous encounters and labs were independently reviewed. Discussed with patient  all pertinent information and results. Patient with relapse in symptoms despite symptomatic treatment with tylenol sinus, afrin and nasal lavages. Coverage with Augmentin for bacterial sinusitis. Discussed patient diagnosis and plan of treatment. Discussed the importance of a low sodium/ heart healthy diet and exercise to achieve overall optimal health. Patient instructed to keep a blood pressure log and bring in to her next appointment with her primary care provider. Patient instructed to follow up sooner if her blood pressure is consistently greater than 140/90. Additional plan of care as outlined above. Patient  was given all follow up and return instructions. All questions and concerns were addressed at this time. Patient  expresses understanding of information and  instructions, and is comfortable with plan.    Patient was instructed to follow up with his Primary Care Provider if no improvement in symptoms in 5 DAYS:  or go to ED immediately for any worsening or change in current symptoms. Patient verbalized understanding and agrees with the discussed plan of care. Patient remained stable throughout the visit and exited the room in NAD.     Patient Instructions   Elevated Blood Pressure  Your blood pressure was elevated during your visit to the urgent care.  It was not so high that immediate care was needed but it is recommended that you monitor your blood pressure over the next week or two to make sure that it is not staying elevated.  Please have your blood pressure taken 2-3 times daily at different times of the day.  Write all of those blood pressures down and record the time that they were taken.  Keep all that information and take it with you to see your Primary Care Physician.  If your blood pressure is consistently above 140/90 you will need to follow up with your PCP more quickly  CONJUNCTIVITIS/PINK EYE:      Use the eye drop as directed.     You should avoid touching your eyes and wash your hands to avoid spreading the infection.    Clean cool compresses to the eye may be comforting    Wear your glasses and avoid wearing contacts until the infection is better (at least 1-2 weeks from now).    Change/ wash bedding.     Wear sunglasses on ada days.     If your symptoms do not improve, you will need to follow-up with ophthalmology as we discussed.       PLEASE READ YOUR DISCHARGE INSTRUCTIONS ENTIRELY AS IT CONTAINS IMPORTANT INFORMATION.  Please take all medications as prescribed.   Please complete your entire course of Augmentinas prescribed to ensure effectiveness.   Please start an OTC probiotic while taking antibiotics to replenish GI reza affected by antibiotic use.     Please drink plenty of fluids.    Please get plenty of rest.    Please return here or go to  the Emergency Department for any concerns or worsening of condition.    You can try breathe right strips at night to help you breathe.  A cool mist humidifier in bedroom may help with cough and relieve stuffy nose.     Please take an over the counter antihistamine medication (Allegra/Claritin/Zyrtec) of your choice as directed for allergy symptoms and/or runny nose and postnasal drip.    Try an over the counter decongestant for sinus pressure/ear pressure, congestion symptoms like Mucinex D or Sudafed or Phenylephrine. You buy this behind the pharmacy counter.    If you do have Hypertension or palpitations, it is safe to take Coricidin HBP for relief of sinus symptoms.    Certain OTC cough and cold medications may raise your blood pressure. To keep your blood pressure regulated avoid over-the-counter decongestants and multisymptom cold remedies that contain decongestants -- such as pseudoephedrine, ephedrine, phenylephrine, naphazoline and oxymetazoline. Also, check the label for high sodium content, which can also raise blood pressure.       Tylenol or ibuprofen can also be used as directed for pain and fever unless you have an allergy to them or medical condition such as stomach ulcers, kidney or liver disease or blood thinners etc for which you should not be taking these type of medications.     Sore throat recommendations: Warm fluids, warm salt water gargles, throat lozenges, tea, honey, soup, rest, hydration.    Use over the counter Flonase or Nasocort: one spray each nostril twice daily OR two sprays each nostril once daily until nares dry out, unless you have Glaucoma.   Can also supplement with nasal saline rinse.    Sinus rinses DO NOT USE TAP WATER, if you must, water must be at a rolling boil for 1 minute, let it cool, then use.  May use distilled water, or over the counter nasal saline rinses.  Moris's vapor rub in shower to help open nasal passages.  May use nasal gel to keep passages moisturized.  May  use nasal saline sprays during the day for added relief of congestion.   For those who go to the gym, please do not use the sauna or steam room now to clear sinuses.    Cough     Rest and fluids are important  Can use honey with ivania to soothe your throat    Robitussin or Delsyum for cough suppressant for dry cough.    Mucinex DM or products containing Guaifenesin or Dextromethorphan for expectorant (wet cough).      Please follow up with your primary care doctor or specialist in the next 48-72hrs as needed and if no improvement    If you smoke, please stop smoking.    Please return or see your primary care doctor if you develop new or worsening symptoms.     Lastly, good hand washing and cough hygiene (cough into your elbow) will help prevent the spread of the illness. A general rule is that you are no longer contagious once you have been without a fever for over 24 hours without requiring fever reducing medications.   FLONASE AS DIRECTED--    How do you use a Nasal Spray?    Make sure you understand your dosing instructions. Spray only the number of prescribed sprays in each nostril. Read the package instructions before using your spray the first time.    Most sprays suggest the following steps:    Wash your hands well.    Gently blow your nose to clear the passageway.    Shake the container several times.    Tilt your head slightly downward.  Use the opposite hand from the nostril you will be spraying to hold the spray bottle.    Block one nostril with your finger.  Insert the nasal applicator into the other nostril.    Aim the spray toward the outer wall of the nostril.  Inhale slowly through the nose and press the .    Breathe out and repeat to apply the prescribed number of sprays.  Repeat these steps for the other nostril.     Avoid sneezing or blowing your nose right after spraying.         Please arrange follow up with your primary medical clinic as soon as possible. You must understand  that you've received an Urgent Care treatment only and that you may be released before all of your medical problems are known or treated. You, the patient, will arrange for follow up as instructed. If your symptoms worsen or fail to improve you should go to the Emergency Room.

## 2023-04-18 NOTE — PATIENT INSTRUCTIONS
Elevated Blood Pressure  Your blood pressure was elevated during your visit to the urgent care.  It was not so high that immediate care was needed but it is recommended that you monitor your blood pressure over the next week or two to make sure that it is not staying elevated.  Please have your blood pressure taken 2-3 times daily at different times of the day.  Write all of those blood pressures down and record the time that they were taken.  Keep all that information and take it with you to see your Primary Care Physician.  If your blood pressure is consistently above 140/90 you will need to follow up with your PCP more quickly  CONJUNCTIVITIS/PINK EYE:      Use the eye drop as directed.     You should avoid touching your eyes and wash your hands to avoid spreading the infection.    Clean cool compresses to the eye may be comforting    Wear your glasses and avoid wearing contacts until the infection is better (at least 1-2 weeks from now).    Change/ wash bedding.     Wear sunglasses on ada days.     If your symptoms do not improve, you will need to follow-up with ophthalmology as we discussed.       PLEASE READ YOUR DISCHARGE INSTRUCTIONS ENTIRELY AS IT CONTAINS IMPORTANT INFORMATION.  Please take all medications as prescribed.   Please complete your entire course of Augmentinas prescribed to ensure effectiveness.   Please start an OTC probiotic while taking antibiotics to replenish GI reza affected by antibiotic use.     Please drink plenty of fluids.    Please get plenty of rest.    Please return here or go to the Emergency Department for any concerns or worsening of condition.    You can try breathe right strips at night to help you breathe.  A cool mist humidifier in bedroom may help with cough and relieve stuffy nose.     Please take an over the counter antihistamine medication (Allegra/Claritin/Zyrtec) of your choice as directed for allergy symptoms and/or runny nose and postnasal drip.    Try an over  the counter decongestant for sinus pressure/ear pressure, congestion symptoms like Mucinex D or Sudafed or Phenylephrine. You buy this behind the pharmacy counter.    If you do have Hypertension or palpitations, it is safe to take Coricidin HBP for relief of sinus symptoms.    Certain OTC cough and cold medications may raise your blood pressure. To keep your blood pressure regulated avoid over-the-counter decongestants and multisymptom cold remedies that contain decongestants -- such as pseudoephedrine, ephedrine, phenylephrine, naphazoline and oxymetazoline. Also, check the label for high sodium content, which can also raise blood pressure.       Tylenol or ibuprofen can also be used as directed for pain and fever unless you have an allergy to them or medical condition such as stomach ulcers, kidney or liver disease or blood thinners etc for which you should not be taking these type of medications.     Sore throat recommendations: Warm fluids, warm salt water gargles, throat lozenges, tea, honey, soup, rest, hydration.    Use over the counter Flonase or Nasocort: one spray each nostril twice daily OR two sprays each nostril once daily until nares dry out, unless you have Glaucoma.   Can also supplement with nasal saline rinse.    Sinus rinses DO NOT USE TAP WATER, if you must, water must be at a rolling boil for 1 minute, let it cool, then use.  May use distilled water, or over the counter nasal saline rinses.  Moris's vapor rub in shower to help open nasal passages.  May use nasal gel to keep passages moisturized.  May use nasal saline sprays during the day for added relief of congestion.   For those who go to the gym, please do not use the sauna or steam room now to clear sinuses.    Cough     Rest and fluids are important  Can use honey with ivania to soothe your throat    Robitussin or Delsyum for cough suppressant for dry cough.    Mucinex DM or products containing Guaifenesin or Dextromethorphan for  expectorant (wet cough).      Please follow up with your primary care doctor or specialist in the next 48-72hrs as needed and if no improvement    If you smoke, please stop smoking.    Please return or see your primary care doctor if you develop new or worsening symptoms.     Lastly, good hand washing and cough hygiene (cough into your elbow) will help prevent the spread of the illness. A general rule is that you are no longer contagious once you have been without a fever for over 24 hours without requiring fever reducing medications.   FLONASE AS DIRECTED--    How do you use a Nasal Spray?    Make sure you understand your dosing instructions. Spray only the number of prescribed sprays in each nostril. Read the package instructions before using your spray the first time.    Most sprays suggest the following steps:    Wash your hands well.    Gently blow your nose to clear the passageway.    Shake the container several times.    Tilt your head slightly downward.  Use the opposite hand from the nostril you will be spraying to hold the spray bottle.    Block one nostril with your finger.  Insert the nasal applicator into the other nostril.    Aim the spray toward the outer wall of the nostril.  Inhale slowly through the nose and press the .    Breathe out and repeat to apply the prescribed number of sprays.  Repeat these steps for the other nostril.     Avoid sneezing or blowing your nose right after spraying.         Please arrange follow up with your primary medical clinic as soon as possible. You must understand that you've received an Urgent Care treatment only and that you may be released before all of your medical problems are known or treated. You, the patient, will arrange for follow up as instructed. If your symptoms worsen or fail to improve you should go to the Emergency Room.

## 2023-04-21 ENCOUNTER — TELEPHONE (OUTPATIENT)
Dept: URGENT CARE | Facility: CLINIC | Age: 43
End: 2023-04-21
Payer: COMMERCIAL

## 2023-05-17 DIAGNOSIS — R07.9 CHEST PAIN, UNSPECIFIED TYPE: Primary | ICD-10-CM

## 2023-05-19 ENCOUNTER — PATIENT MESSAGE (OUTPATIENT)
Dept: SURGERY | Facility: CLINIC | Age: 43
End: 2023-05-19
Payer: COMMERCIAL

## 2023-06-05 DIAGNOSIS — I10 ESSENTIAL HYPERTENSION: Primary | ICD-10-CM

## 2023-06-05 DIAGNOSIS — Z00.00 ROUTINE HEALTH MAINTENANCE: ICD-10-CM

## 2023-06-12 ENCOUNTER — PATIENT MESSAGE (OUTPATIENT)
Dept: INTERNAL MEDICINE | Facility: CLINIC | Age: 43
End: 2023-06-12
Payer: COMMERCIAL

## 2023-06-13 ENCOUNTER — PATIENT MESSAGE (OUTPATIENT)
Dept: INTERNAL MEDICINE | Facility: CLINIC | Age: 43
End: 2023-06-13
Payer: COMMERCIAL

## 2023-06-13 ENCOUNTER — PATIENT MESSAGE (OUTPATIENT)
Dept: INFUSION THERAPY | Facility: HOSPITAL | Age: 43
End: 2023-06-13
Payer: COMMERCIAL

## 2023-06-16 ENCOUNTER — LAB VISIT (OUTPATIENT)
Dept: LAB | Facility: HOSPITAL | Age: 43
End: 2023-06-16
Attending: FAMILY MEDICINE
Payer: COMMERCIAL

## 2023-06-16 ENCOUNTER — OFFICE VISIT (OUTPATIENT)
Dept: INTERNAL MEDICINE | Facility: CLINIC | Age: 43
End: 2023-06-16
Payer: COMMERCIAL

## 2023-06-16 VITALS
SYSTOLIC BLOOD PRESSURE: 128 MMHG | HEART RATE: 95 BPM | DIASTOLIC BLOOD PRESSURE: 82 MMHG | TEMPERATURE: 97 F | BODY MASS INDEX: 39.58 KG/M2 | HEIGHT: 66 IN | WEIGHT: 246.25 LBS

## 2023-06-16 DIAGNOSIS — E66.01 MORBID OBESITY WITH BMI OF 40.0-44.9, ADULT: ICD-10-CM

## 2023-06-16 DIAGNOSIS — I10 HYPERTENSION, UNSPECIFIED TYPE: Primary | ICD-10-CM

## 2023-06-16 DIAGNOSIS — Z91.89 AT HIGH RISK FOR BREAST CANCER: ICD-10-CM

## 2023-06-16 DIAGNOSIS — Z01.818 PREOP EXAMINATION: ICD-10-CM

## 2023-06-16 LAB
ALBUMIN SERPL BCP-MCNC: 3.7 G/DL (ref 3.5–5.2)
ALP SERPL-CCNC: 77 U/L (ref 55–135)
ALT SERPL W/O P-5'-P-CCNC: 17 U/L (ref 10–44)
ANION GAP SERPL CALC-SCNC: 9 MMOL/L (ref 8–16)
AST SERPL-CCNC: 14 U/L (ref 10–40)
BASOPHILS # BLD AUTO: 0.1 K/UL (ref 0–0.2)
BASOPHILS NFR BLD: 1.1 % (ref 0–1.9)
BILIRUB SERPL-MCNC: 0.3 MG/DL (ref 0.1–1)
BUN SERPL-MCNC: 13 MG/DL (ref 6–20)
CALCIUM SERPL-MCNC: 9.6 MG/DL (ref 8.7–10.5)
CHLORIDE SERPL-SCNC: 102 MMOL/L (ref 95–110)
CO2 SERPL-SCNC: 22 MMOL/L (ref 23–29)
CREAT SERPL-MCNC: 0.7 MG/DL (ref 0.5–1.4)
DIFFERENTIAL METHOD: ABNORMAL
EOSINOPHIL # BLD AUTO: 0.2 K/UL (ref 0–0.5)
EOSINOPHIL NFR BLD: 2.6 % (ref 0–8)
ERYTHROCYTE [DISTWIDTH] IN BLOOD BY AUTOMATED COUNT: 14.6 % (ref 11.5–14.5)
EST. GFR  (NO RACE VARIABLE): >60 ML/MIN/1.73 M^2
GLUCOSE SERPL-MCNC: 80 MG/DL (ref 70–110)
HCT VFR BLD AUTO: 38.5 % (ref 37–48.5)
HGB BLD-MCNC: 12.1 G/DL (ref 12–16)
IMM GRANULOCYTES # BLD AUTO: 0.02 K/UL (ref 0–0.04)
IMM GRANULOCYTES NFR BLD AUTO: 0.2 % (ref 0–0.5)
LYMPHOCYTES # BLD AUTO: 2.2 K/UL (ref 1–4.8)
LYMPHOCYTES NFR BLD: 23.5 % (ref 18–48)
MCH RBC QN AUTO: 26.7 PG (ref 27–31)
MCHC RBC AUTO-ENTMCNC: 31.4 G/DL (ref 32–36)
MCV RBC AUTO: 85 FL (ref 82–98)
MONOCYTES # BLD AUTO: 0.7 K/UL (ref 0.3–1)
MONOCYTES NFR BLD: 7.4 % (ref 4–15)
NEUTROPHILS # BLD AUTO: 6 K/UL (ref 1.8–7.7)
NEUTROPHILS NFR BLD: 65.2 % (ref 38–73)
NRBC BLD-RTO: 0 /100 WBC
PLATELET # BLD AUTO: 396 K/UL (ref 150–450)
PMV BLD AUTO: 10 FL (ref 9.2–12.9)
POTASSIUM SERPL-SCNC: 4.1 MMOL/L (ref 3.5–5.1)
PROT SERPL-MCNC: 7.5 G/DL (ref 6–8.4)
RBC # BLD AUTO: 4.53 M/UL (ref 4–5.4)
SODIUM SERPL-SCNC: 133 MMOL/L (ref 136–145)
WBC # BLD AUTO: 9.24 K/UL (ref 3.9–12.7)

## 2023-06-16 PROCEDURE — 99999 PR PBB SHADOW E&M-EST. PATIENT-LVL III: ICD-10-PCS | Mod: PBBFAC,,, | Performed by: FAMILY MEDICINE

## 2023-06-16 PROCEDURE — 3008F BODY MASS INDEX DOCD: CPT | Mod: CPTII,S$GLB,, | Performed by: FAMILY MEDICINE

## 2023-06-16 PROCEDURE — 80053 COMPREHEN METABOLIC PANEL: CPT | Performed by: FAMILY MEDICINE

## 2023-06-16 PROCEDURE — 99999 PR PBB SHADOW E&M-EST. PATIENT-LVL III: CPT | Mod: PBBFAC,,, | Performed by: FAMILY MEDICINE

## 2023-06-16 PROCEDURE — 3079F DIAST BP 80-89 MM HG: CPT | Mod: CPTII,S$GLB,, | Performed by: FAMILY MEDICINE

## 2023-06-16 PROCEDURE — 99214 PR OFFICE/OUTPT VISIT, EST, LEVL IV, 30-39 MIN: ICD-10-PCS | Mod: S$GLB,,, | Performed by: FAMILY MEDICINE

## 2023-06-16 PROCEDURE — 4010F PR ACE/ARB THEARPY RXD/TAKEN: ICD-10-PCS | Mod: CPTII,S$GLB,, | Performed by: FAMILY MEDICINE

## 2023-06-16 PROCEDURE — 1160F RVW MEDS BY RX/DR IN RCRD: CPT | Mod: CPTII,S$GLB,, | Performed by: FAMILY MEDICINE

## 2023-06-16 PROCEDURE — 1159F MED LIST DOCD IN RCRD: CPT | Mod: CPTII,S$GLB,, | Performed by: FAMILY MEDICINE

## 2023-06-16 PROCEDURE — 99214 OFFICE O/P EST MOD 30 MIN: CPT | Mod: S$GLB,,, | Performed by: FAMILY MEDICINE

## 2023-06-16 PROCEDURE — 3008F PR BODY MASS INDEX (BMI) DOCUMENTED: ICD-10-PCS | Mod: CPTII,S$GLB,, | Performed by: FAMILY MEDICINE

## 2023-06-16 PROCEDURE — 1160F PR REVIEW ALL MEDS BY PRESCRIBER/CLIN PHARMACIST DOCUMENTED: ICD-10-PCS | Mod: CPTII,S$GLB,, | Performed by: FAMILY MEDICINE

## 2023-06-16 PROCEDURE — 36415 COLL VENOUS BLD VENIPUNCTURE: CPT | Mod: PO | Performed by: FAMILY MEDICINE

## 2023-06-16 PROCEDURE — 4010F ACE/ARB THERAPY RXD/TAKEN: CPT | Mod: CPTII,S$GLB,, | Performed by: FAMILY MEDICINE

## 2023-06-16 PROCEDURE — 3079F PR MOST RECENT DIASTOLIC BLOOD PRESSURE 80-89 MM HG: ICD-10-PCS | Mod: CPTII,S$GLB,, | Performed by: FAMILY MEDICINE

## 2023-06-16 PROCEDURE — 85025 COMPLETE CBC W/AUTO DIFF WBC: CPT | Performed by: FAMILY MEDICINE

## 2023-06-16 PROCEDURE — 1159F PR MEDICATION LIST DOCUMENTED IN MEDICAL RECORD: ICD-10-PCS | Mod: CPTII,S$GLB,, | Performed by: FAMILY MEDICINE

## 2023-06-16 PROCEDURE — 3074F SYST BP LT 130 MM HG: CPT | Mod: CPTII,S$GLB,, | Performed by: FAMILY MEDICINE

## 2023-06-16 PROCEDURE — 3074F PR MOST RECENT SYSTOLIC BLOOD PRESSURE < 130 MM HG: ICD-10-PCS | Mod: CPTII,S$GLB,, | Performed by: FAMILY MEDICINE

## 2023-06-16 NOTE — PROGRESS NOTES
"Subjective:      Patient ID: Niesha Abarca is a 42 y.o. female.    Chief Complaint: Pre-op Exam    HPI  43 yo here for preop.  Having breast reduction with Dr. Carias on .  Had D&C earlier this week for abnormal bleeding/endometrial thickening.  When she did labs, Cr was elevated at 1.4.  Normal BUN  Some anemia.  Seeing Cards next week//will get EKG there.  At time of above labs//was taking ibuprofen 600mg bid for menstrual pain.      Past Medical History:   Diagnosis Date    Anxiety     Essential hypertension 2019    Hyperlipidemia     IBS (irritable bowel syndrome)      Family History   Adopted: Yes   Problem Relation Age of Onset    No Known Problems Mother     No Known Problems Father     Breast cancer Paternal Grandmother      Past Surgical History:   Procedure Laterality Date    APPENDECTOMY       SECTION      COLONOSCOPY N/A 2022    Procedure: COLONOSCOPY;  Surgeon: Tosin Yoo MD;  Location: [facility];  Service: Endoscopy;  Laterality: N/A;    WISDOM TOOTH EXTRACTION       Social History     Tobacco Use    Smoking status: Former     Packs/day: 0.50     Years: 15.00     Pack years: 7.50     Types: Cigarettes     Quit date: 2018     Years since quittin.1     Passive exposure: Never    Smokeless tobacco: Never   Substance Use Topics    Alcohol use: Not Currently    Drug use: No       /82   Pulse 95   Temp 97.4 °F (36.3 °C) (Tympanic)   Ht 5' 5.5" (1.664 m)   Wt 111.7 kg (246 lb 4.1 oz)   BMI 40.36 kg/m²     Review of Systems   Constitutional:  Negative for activity change and unexpected weight change.   HENT:  Negative for hearing loss, rhinorrhea and trouble swallowing.    Eyes:  Negative for discharge and visual disturbance.   Respiratory:  Negative for chest tightness and wheezing.    Cardiovascular:  Negative for chest pain and palpitations.   Gastrointestinal:  Negative for blood in stool, constipation, diarrhea and vomiting.   Endocrine: Negative " for polydipsia and polyuria.   Genitourinary:  Negative for difficulty urinating, dysuria, hematuria and menstrual problem.   Musculoskeletal:  Negative for arthralgias, joint swelling and neck pain.   Neurological:  Negative for weakness and headaches.   Psychiatric/Behavioral:  Negative for confusion and dysphoric mood.      Objective:     Physical Exam  Vitals and nursing note reviewed.   Constitutional:       General: She is not in acute distress.     Appearance: She is well-developed.   Eyes:      Conjunctiva/sclera: Conjunctivae normal.      Pupils: Pupils are equal, round, and reactive to light.   Neck:      Thyroid: No thyromegaly.   Cardiovascular:      Rate and Rhythm: Normal rate and regular rhythm.      Heart sounds: Normal heart sounds. No murmur heard.    No gallop.   Pulmonary:      Effort: Pulmonary effort is normal. No respiratory distress.      Breath sounds: Normal breath sounds. No wheezing or rales.   Abdominal:      General: Bowel sounds are normal. There is no distension.      Palpations: Abdomen is soft.      Tenderness: There is no abdominal tenderness. There is no guarding.   Musculoskeletal:         General: Normal range of motion.      Cervical back: Normal range of motion and neck supple.   Skin:     General: Skin is warm and dry.      Findings: No rash.   Neurological:      Mental Status: She is alert and oriented to person, place, and time.      Cranial Nerves: No cranial nerve deficit.   Psychiatric:         Behavior: Behavior normal.         Thought Content: Thought content normal.         Judgment: Judgment normal.       Lab Results   Component Value Date    WBC 6.18 10/04/2022    HGB 11.7 (L) 10/04/2022    HCT 38.8 10/04/2022     10/04/2022    CHOL 277 (H) 10/04/2022    TRIG 85 10/04/2022    HDL 51 10/04/2022    ALT 22 10/04/2022    AST 12 10/04/2022     10/04/2022    K 4.3 10/04/2022     10/04/2022    CREATININE 0.7 10/04/2022    BUN 16 10/04/2022    CO2 24  10/04/2022    TSH 1.568 10/04/2022    HGBA1C 5.4 10/04/2022       Assessment:     1. Hypertension, unspecified type    2. At high risk for breast cancer    3. Preop examination    4. Morbid obesity with BMI of 40.0-44.9, adult         Plan:     Hypertension, unspecified type    At high risk for breast cancer    Preop examination  -     CBC Auto Differential; Future; Expected date: 06/16/2023  -     Comprehensive Metabolic Panel; Future; Expected date: 06/16/2023    Morbid obesity with BMI of 40.0-44.9, adult    Bp controlled  Check labs  Overall, pt low risk.  Upcoming Cards appt//will request EKG then  Cont meds//take BP meds AM of surgery  Try to avoid NSAIDs for now in general  Fu PRN  Will send preop to Dr Carias once complete

## 2023-06-21 ENCOUNTER — OFFICE VISIT (OUTPATIENT)
Dept: CARDIOLOGY | Facility: CLINIC | Age: 43
End: 2023-06-21
Payer: COMMERCIAL

## 2023-06-21 ENCOUNTER — HOSPITAL ENCOUNTER (OUTPATIENT)
Dept: CARDIOLOGY | Facility: HOSPITAL | Age: 43
Discharge: HOME | End: 2023-06-21
Attending: INTERNAL MEDICINE
Payer: COMMERCIAL

## 2023-06-21 ENCOUNTER — TELEPHONE (OUTPATIENT)
Dept: PHARMACY | Facility: CLINIC | Age: 43
End: 2023-06-21
Payer: COMMERCIAL

## 2023-06-21 VITALS
HEIGHT: 65 IN | HEART RATE: 99 BPM | BODY MASS INDEX: 41.51 KG/M2 | SYSTOLIC BLOOD PRESSURE: 133 MMHG | DIASTOLIC BLOOD PRESSURE: 84 MMHG | WEIGHT: 250.69 LBS | BODY MASS INDEX: 41.08 KG/M2 | WEIGHT: 249.13 LBS

## 2023-06-21 DIAGNOSIS — R07.9 CHEST PAIN, UNSPECIFIED TYPE: ICD-10-CM

## 2023-06-21 DIAGNOSIS — Z86.16 HISTORY OF COVID-19: ICD-10-CM

## 2023-06-21 DIAGNOSIS — Z82.49 FAMILY HISTORY OF EARLY CAD: ICD-10-CM

## 2023-06-21 DIAGNOSIS — I10 ESSENTIAL HYPERTENSION: Primary | ICD-10-CM

## 2023-06-21 DIAGNOSIS — E78.2 MIXED HYPERLIPIDEMIA: ICD-10-CM

## 2023-06-21 DIAGNOSIS — G47.33 OSA (OBSTRUCTIVE SLEEP APNEA): ICD-10-CM

## 2023-06-21 DIAGNOSIS — Z78.9 STATIN INTOLERANCE: ICD-10-CM

## 2023-06-21 DIAGNOSIS — K21.9 GASTROESOPHAGEAL REFLUX DISEASE, UNSPECIFIED WHETHER ESOPHAGITIS PRESENT: ICD-10-CM

## 2023-06-21 DIAGNOSIS — Z00.00 ROUTINE HEALTH MAINTENANCE: ICD-10-CM

## 2023-06-21 DIAGNOSIS — K58.8 OTHER IRRITABLE BOWEL SYNDROME: ICD-10-CM

## 2023-06-21 DIAGNOSIS — I10 ESSENTIAL HYPERTENSION: ICD-10-CM

## 2023-06-21 PROCEDURE — 99214 OFFICE O/P EST MOD 30 MIN: CPT | Mod: S$GLB,,, | Performed by: INTERNAL MEDICINE

## 2023-06-21 PROCEDURE — 93010 EKG 12-LEAD: ICD-10-PCS | Mod: ,,, | Performed by: INTERNAL MEDICINE

## 2023-06-21 PROCEDURE — 1160F PR REVIEW ALL MEDS BY PRESCRIBER/CLIN PHARMACIST DOCUMENTED: ICD-10-PCS | Mod: CPTII,S$GLB,, | Performed by: INTERNAL MEDICINE

## 2023-06-21 PROCEDURE — 3008F BODY MASS INDEX DOCD: CPT | Mod: CPTII,S$GLB,, | Performed by: INTERNAL MEDICINE

## 2023-06-21 PROCEDURE — 99999 PR PBB SHADOW E&M-EST. PATIENT-LVL IV: ICD-10-PCS | Mod: PBBFAC,,, | Performed by: INTERNAL MEDICINE

## 2023-06-21 PROCEDURE — 3075F SYST BP GE 130 - 139MM HG: CPT | Mod: CPTII,S$GLB,, | Performed by: INTERNAL MEDICINE

## 2023-06-21 PROCEDURE — 3008F PR BODY MASS INDEX (BMI) DOCUMENTED: ICD-10-PCS | Mod: CPTII,S$GLB,, | Performed by: INTERNAL MEDICINE

## 2023-06-21 PROCEDURE — 3079F DIAST BP 80-89 MM HG: CPT | Mod: CPTII,S$GLB,, | Performed by: INTERNAL MEDICINE

## 2023-06-21 PROCEDURE — 93010 ELECTROCARDIOGRAM REPORT: CPT | Mod: ,,, | Performed by: INTERNAL MEDICINE

## 2023-06-21 PROCEDURE — 1159F PR MEDICATION LIST DOCUMENTED IN MEDICAL RECORD: ICD-10-PCS | Mod: CPTII,S$GLB,, | Performed by: INTERNAL MEDICINE

## 2023-06-21 PROCEDURE — 1159F MED LIST DOCD IN RCRD: CPT | Mod: CPTII,S$GLB,, | Performed by: INTERNAL MEDICINE

## 2023-06-21 PROCEDURE — 93005 ELECTROCARDIOGRAM TRACING: CPT

## 2023-06-21 PROCEDURE — 3079F PR MOST RECENT DIASTOLIC BLOOD PRESSURE 80-89 MM HG: ICD-10-PCS | Mod: CPTII,S$GLB,, | Performed by: INTERNAL MEDICINE

## 2023-06-21 PROCEDURE — 3075F PR MOST RECENT SYSTOLIC BLOOD PRESS GE 130-139MM HG: ICD-10-PCS | Mod: CPTII,S$GLB,, | Performed by: INTERNAL MEDICINE

## 2023-06-21 PROCEDURE — 4010F ACE/ARB THERAPY RXD/TAKEN: CPT | Mod: CPTII,S$GLB,, | Performed by: INTERNAL MEDICINE

## 2023-06-21 PROCEDURE — 99999 PR PBB SHADOW E&M-EST. PATIENT-LVL IV: CPT | Mod: PBBFAC,,, | Performed by: INTERNAL MEDICINE

## 2023-06-21 PROCEDURE — 99214 PR OFFICE/OUTPT VISIT, EST, LEVL IV, 30-39 MIN: ICD-10-PCS | Mod: S$GLB,,, | Performed by: INTERNAL MEDICINE

## 2023-06-21 PROCEDURE — 4010F PR ACE/ARB THEARPY RXD/TAKEN: ICD-10-PCS | Mod: CPTII,S$GLB,, | Performed by: INTERNAL MEDICINE

## 2023-06-21 PROCEDURE — 1160F RVW MEDS BY RX/DR IN RCRD: CPT | Mod: CPTII,S$GLB,, | Performed by: INTERNAL MEDICINE

## 2023-06-21 RX ORDER — EVOLOCUMAB 140 MG/ML
140 INJECTION, SOLUTION SUBCUTANEOUS
Qty: 2 ML | Refills: 6 | Status: ACTIVE | OUTPATIENT
Start: 2023-06-21 | End: 2023-12-21

## 2023-06-21 RX ORDER — AMLODIPINE BESYLATE 5 MG/1
5 TABLET ORAL 2 TIMES DAILY
Qty: 180 TABLET | Refills: 2 | Status: SHIPPED | OUTPATIENT
Start: 2023-06-21

## 2023-06-21 RX ORDER — LOSARTAN POTASSIUM 50 MG/1
50 TABLET ORAL NIGHTLY
Qty: 90 TABLET | Refills: 3 | Status: SHIPPED | OUTPATIENT
Start: 2023-06-21 | End: 2024-06-20

## 2023-06-21 NOTE — TELEPHONE ENCOUNTER
Luis, this is Yulia Rollins, clinical pharmacist with [facility] that is part of your care team.  We have begun working on your prescription that your doctor has sent us. Our next steps include:     Working with your insurance company to obtain approval for your medication  Working with you to ensure your medication is affordable     We will be calling you along the way with updates on your medication but if you have any concerns or receive information that you would like to discuss please reach us at [phone].    Welcome call outcome: Left voicemail

## 2023-06-21 NOTE — PROGRESS NOTES
Subjective:   Patient ID:  Niesha Abarca is a 42 y.o. female who presents for cardiac consult of Follow-up, Heartburn, and Hypertension      The patient came in today for cardiac consult of Follow-up, Heartburn, and Hypertension      Referring Physician: Kuldip Bentley MD   Reason for initial consult: CVD Risk, HLD    Niesha Abarca is a 42 y.o. female pt with HTN, HLD, Pre-ecclamsia, insulin resistance,  obesity, FH CAD, prior smoker presents for follow up CV evaluation.    19  She had pre-ecclampsia - Oct 2018 and has had high BP since then. She was on Procardia initially and now has been on Labetolol. Has had more fatigue - will change BB to CCB. Maternal grandmother  at age 39, recently found out info as she was adopted. She was on statin in the past and felt muscle pain/fatigue and took months to feel normal. Was taking metformin before for insulin resistance - A1c normal now. Occ L side atypical pain, unsure if anxiety caused. No exertional CP.      19  ECG stress neg for ischemia but HTN up to 230s/60s. ECHO overall normal. , on Zetia will start. Was on simvastatin in the past. No CP.     20  LDL remains 200 recent lipids. Zetia started last visit but could not tolerate, she has felt SOB/fatigue and after 6 weeks had to stop taking it. Breathing has improved, Repatha ordered.   ECG - NSR with sinus arrythmia     3/226/20  She could not tolerate Simvastatin in the past, could not tolerate Zetia. Per pharmacist - Patient has only tried simvastatin and reports myalgias, but no further details. This information is insufficient per plan for Repatha coverage. Msg sent to provider to advise. Appeal window: 180 days. TTN  Occ headaches, told to check BP, no fevers/chills    20  Ordered Repatha but not approved, on pravastatin now. She has tried it for a few months has severe muscle pain and joint pain could not tolerate. Discussed will stop and reorder Repatha.   No  CP/SOB.     11/30/20  Has been on Repatha, recent LDL improved to 117 from 193. CK and CMP normal. She had cold symptoms after first shot, neg COVID test and then was ok. She has more back ache than normal. She has back pain at times. She just arrived from work, BP elevated. Had Thanksgiving recently with extra salt. Occ chest pain at times, more anxiety at times.     4/19/21  BP elevated 130s/90s, HR 95. She had dental work a few weeks ago, BP was 160/110 had her first cavity filling. She had 2nd COVID vaccine last week. She has been having more CP, in center of chest pain  ECG -NSR, poor RWP - old     7/27/21  Recent LDL worse 134 from 117. Sleep study with mild CARMEN. Added benicar last visit. BP elevated today 148/94. Bp at home has been normal. Still has atypical CP.    10/8/21  BP elevated 150/86. . She had strep throat 2 weeks ago. 134/75 at prior PCP visit. She went to GI and had started protonix. Reflux improved now. She will get mouthguard for CARMEN.    1/12/22  Lipids improved in Sept '21.  BP elevated 150s/80s. She had COVID 3 weeks ago, she feels more tired, had headache and fever. She still has stuffy nose.   ECG - NSR    7/14/22  BP and HR well controlled. BMI 40 - 240 lbs.   ECG - NSR, LVH    10/4/22    Hi Mathew Silver been having high bp. 9/26 went to urgent care after I felt dizzy, faint, generally unwell & checked my bp at work- it was 160/101. When I got to urgent care it was down to 157/97. Tested for Covid & flu- both neg. Did an EKG it was fine. Sent me to the ER to be sure. ER repeated EKG & did bloodwork all came back fine. ER dr said likely due to stress of recent life events over the last few weeks & to track my bp. Im taking the losartan & amlodipine both at night & the amlodipine again in the morning. BP has been around 154/94. Im wondering if I need a medication adjustment or should come in to see you. I made an appt w/ Dr. Bentley since she had the first available appointment  but wanted to check with you.    Has been having elevated BP at times, had some back pain thought due to Repatha and stopped taking it briefly. Pt has been having more stress at home. BP today 142/88, BMI 40 - 244 lbs.       6/21/23  She has upcoming breast reduction with Dr. Carias on July 17th. Pt had elevated lipids in Oct  '22 while of Repatha. She had D&C recently. She is teaching summer school.   BP and HR stable. BMI 41 - 249 lbs. Will restart Repatha.   ECG - NSR, poor RWP - stable     Patient feels no sob, no leg swelling, no PND, no palpitation, no dizziness, no syncope, no CNS symptoms.    Patient has fairly good exercise tolerance. Is a teacher, 5th grade.     Patient is compliant with medications.    FH - early CAD - maternal GM    Sleep study  PHYSICIAN INTERPRETATION AND COMMENTS: Findings are consistent with mild, positional obstructive sleep apnea(CARMEN).  Overall AHI was 8.0/hr. SpO2 kishan was 85.4%. Therapy should be considered.  CLINICAL HISTORY: 40 year old female presented with: 16 inch neck, BMI of 40.1, an Kasson sleepiness score of 3, history  of hypertension, diabetes and symptoms of nocturnal snoring. Based on the clinical history, the patient has a high pre-test  probability of having Moderate CARMEN. STOPBANG score 5. Kasson score 5.      EKG Conclusions:    1. The EKG portion of this study is negative for ischemia at a moderate workload, and peak heart rate of 164 bpm (94% of predicted).   2. Exercise capacity is average.   3. Blood pressure response to exercise was hypertensive (Presenting BP: 147/82 Peak BP: 234/60).   4. No significant arrhythmias were present.   5. There were no symptoms of chest discomfort or significant dyspnea throughout the protocol.   6. The Duke treadmill score was 7 suggesting a low probability for future cardiovascular events.    This document has been electronically    SIGNED BY: Bayron Rankin MD On: 08/03/2019 06:43    CONCLUSIONS     1 - No wall motion  abnormalities.     2 - Normal left ventricular systolic function (EF 55-60%).     3 - Normal left ventricular diastolic function.     4 - Normal right ventricular systolic function .     5 - The estimated PA systolic pressure is 23 mmHg.       This document has been electronically    SIGNED BY: Hola Jerry MD On: 2019 15:16    Past Medical History:   Diagnosis Date    Anxiety     Essential hypertension 2019    Hyperlipidemia     IBS (irritable bowel syndrome)        Past Surgical History:   Procedure Laterality Date    APPENDECTOMY       SECTION      COLONOSCOPY N/A 2022    Procedure: COLONOSCOPY;  Surgeon: Tosin Yoo MD;  Location: [facility];  Service: Endoscopy;  Laterality: N/A;    WISDOM TOOTH EXTRACTION         Social History     Tobacco Use    Smoking status: Former     Packs/day: 0.50     Years: 15.00     Pack years: 7.50     Types: Cigarettes     Quit date: 2018     Years since quittin.1     Passive exposure: Never    Smokeless tobacco: Never   Substance Use Topics    Alcohol use: Not Currently    Drug use: No       Family History   Adopted: Yes   Problem Relation Age of Onset    No Known Problems Mother     No Known Problems Father     Breast cancer Paternal Grandmother        Patient's Medications   New Prescriptions    No medications on file   Previous Medications    AMLODIPINE (NORVASC) 5 MG TABLET    Take 1 tablet (5 mg total) by mouth 2 (two) times a day.    BUPROPION (WELLBUTRIN XL) 150 MG TB24 TABLET    TAKE 1 TABLET(150 MG) BY MOUTH EVERY DAY    BUSPIRONE (BUSPAR) 10 MG TABLET    Take 1 tablet (10 mg total) by mouth 2 (two) times daily.    DOXYLAMINE-PHENYLEPHRINE (POLY HIST FORTE) 10.5-10 MG TAB    Take 1 tablet by mouth every 6 to 8 hours as needed (Cough/congestion).    FLUTICASONE PROPIONATE (FLONASE) 50 MCG/ACTUATION NASAL SPRAY    1 spray (50 mcg total) by Each Nostril route once daily.    IBUPROFEN (ADVIL,MOTRIN) 600 MG TABLET        LOSARTAN  "(COZAAR) 50 MG TABLET    Take 1 tablet (50 mg total) by mouth every evening.    PANTOPRAZOLE (PROTONIX) 40 MG TABLET    Take 1 tablet (40 mg total) by mouth once daily.    VALACYCLOVIR (VALTREX) 1000 MG TABLET    Take 2 tablets (2,000 mg total) by mouth every 12 (twelve) hours. for 2 days   Modified Medications    No medications on file   Discontinued Medications    No medications on file       Review of Systems   Constitutional:  Positive for malaise/fatigue.   HENT: Negative.     Eyes: Negative.    Respiratory: Negative.     Cardiovascular:  Positive for chest pain and leg swelling (rare).   Gastrointestinal: Negative.    Genitourinary: Negative.    Musculoskeletal: Negative.    Skin: Negative.    Neurological: Negative.    Endo/Heme/Allergies: Negative.    Psychiatric/Behavioral: Negative.     All 12 systems otherwise negative.    Wt Readings from Last 3 Encounters:   06/21/23 113 kg (249 lb 1.9 oz)   06/21/23 113.7 kg (250 lb 10.6 oz)   06/16/23 111.7 kg (246 lb 4.1 oz)     Temp Readings from Last 3 Encounters:   06/16/23 97.4 °F (36.3 °C) (Tympanic)   04/18/23 98.1 °F (36.7 °C)   12/15/22 100 °F (37.8 °C) (Tympanic)     BP Readings from Last 3 Encounters:   06/21/23 133/84   06/16/23 128/82   04/18/23 (!) 151/88     Pulse Readings from Last 3 Encounters:   06/21/23 99   06/16/23 95   04/18/23 96       /84 (BP Location: Right arm, Patient Position: Sitting)   Pulse 99   Ht 5' 5" (1.651 m)   Wt 113 kg (249 lb 1.9 oz)   BMI 41.46 kg/m²     Objective:   Physical Exam  Vitals and nursing note reviewed.   Constitutional:       General: She is not in acute distress.     Appearance: She is well-developed. She is obese. She is not diaphoretic.      Comments: Obese   HENT:      Head: Normocephalic and atraumatic.      Nose: Nose normal.   Eyes:      General: No scleral icterus.     Conjunctiva/sclera: Conjunctivae normal.   Neck:      Thyroid: No thyromegaly.      Vascular: No JVD.   Cardiovascular:      Rate " and Rhythm: Normal rate and regular rhythm.      Heart sounds: S1 normal and S2 normal. No murmur heard.    No friction rub. No gallop. No S3 or S4 sounds.   Pulmonary:      Effort: Pulmonary effort is normal. No respiratory distress.      Breath sounds: Normal breath sounds. No stridor. No wheezing or rales.   Chest:      Chest wall: No tenderness.   Abdominal:      General: Bowel sounds are normal. There is no distension.      Palpations: Abdomen is soft. There is no mass.      Tenderness: There is no abdominal tenderness. There is no rebound.   Genitourinary:     Comments: Deferred  Musculoskeletal:         General: No tenderness or deformity. Normal range of motion.      Cervical back: Normal range of motion and neck supple.   Lymphadenopathy:      Cervical: No cervical adenopathy.   Skin:     General: Skin is warm and dry.      Coloration: Skin is not pale.      Findings: No erythema or rash.   Neurological:      Mental Status: She is alert and oriented to person, place, and time.      Motor: No abnormal muscle tone.      Coordination: Coordination normal.   Psychiatric:         Behavior: Behavior normal.         Thought Content: Thought content normal.         Judgment: Judgment normal.       Lab Results   Component Value Date     (L) 06/16/2023    K 4.1 06/16/2023     06/16/2023    CO2 22 (L) 06/16/2023    BUN 13 06/16/2023    CREATININE 0.7 06/16/2023    GLU 80 06/16/2023    HGBA1C 5.4 10/04/2022    AST 14 06/16/2023    ALT 17 06/16/2023    ALBUMIN 3.7 06/16/2023    PROT 7.5 06/16/2023    BILITOT 0.3 06/16/2023    WBC 9.24 06/16/2023    HGB 12.1 06/16/2023    HCT 38.5 06/16/2023    MCV 85 06/16/2023     06/16/2023    TSH 1.568 10/04/2022    CHOL 277 (H) 10/04/2022    HDL 51 10/04/2022    LDLCALC 209.0 (H) 10/04/2022    TRIG 85 10/04/2022     Assessment:      1. Essential hypertension    2. Chest pain, unspecified type    3. Mixed hyperlipidemia    4. Statin intolerance    5. CARMEN  (obstructive sleep apnea)    6. Other irritable bowel syndrome    7. Gastroesophageal reflux disease, unspecified whether esophagitis present    8. History of COVID-19    9. Family history of early CAD    10. BMI 40.0-44.9, adult            Plan:   1. HTN -  - cont meds  - changed benicar to losartan - increased to 50mg QHS, norvasc BID  - low salt diet   - weight loss  - not on birth control    2. 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     3. Insulin resistance - no longer based on recent A1c  - cont meds per PCP      4. Obesity BMI 40 - 244 lbs.  --> BMI 41 - 249 lbs   - need weight loss with diet/exercise   - refer to bariatric diet     5. Chest pain - atypical   - stress test - negative 2019  - prior 2D ECHO - normal  - rare snoring/allergies     6. IBS  - cont tx per PCP  -neg Cscope     7. CARMEN, mild  - cont tx per pulm  - has been using Mouth guard    8. GERD - improved  - cont PPI, f/u GI  - has atypical CP  - may need EGD - low CV risk - pending    9. H/O COVID 19  - stable    10. Preop CV eval - breast reduction with Dr. Carias on July 17th.  - low CV risk, proceed without further CV work up      Thank you for allowing me to participate in this patient's care. Please do not hesitate to contact me with any questions or concerns. Consult note has been forwarded to the referral physician.

## 2023-06-22 ENCOUNTER — SPECIALTY PHARMACY (OUTPATIENT)
Dept: PHARMACY | Facility: CLINIC | Age: 43
End: 2023-06-22
Payer: COMMERCIAL

## 2023-06-22 ENCOUNTER — OFFICE VISIT (OUTPATIENT)
Dept: DERMATOLOGY | Facility: CLINIC | Age: 43
End: 2023-06-22
Payer: COMMERCIAL

## 2023-06-22 DIAGNOSIS — D22.9 MULTIPLE BENIGN NEVI: ICD-10-CM

## 2023-06-22 DIAGNOSIS — L82.1 SEBORRHEIC KERATOSIS: ICD-10-CM

## 2023-06-22 DIAGNOSIS — L81.4 SOLAR LENTIGO: ICD-10-CM

## 2023-06-22 DIAGNOSIS — D18.00 HEMANGIOMA, UNSPECIFIED SITE: ICD-10-CM

## 2023-06-22 DIAGNOSIS — Z12.83 SKIN CANCER SCREENING: Primary | ICD-10-CM

## 2023-06-22 PROCEDURE — 1159F PR MEDICATION LIST DOCUMENTED IN MEDICAL RECORD: ICD-10-PCS | Mod: CPTII,S$GLB,, | Performed by: STUDENT IN AN ORGANIZED HEALTH CARE EDUCATION/TRAINING PROGRAM

## 2023-06-22 PROCEDURE — 4010F PR ACE/ARB THEARPY RXD/TAKEN: ICD-10-PCS | Mod: CPTII,S$GLB,, | Performed by: STUDENT IN AN ORGANIZED HEALTH CARE EDUCATION/TRAINING PROGRAM

## 2023-06-22 PROCEDURE — 99214 OFFICE O/P EST MOD 30 MIN: CPT | Mod: S$GLB,,, | Performed by: STUDENT IN AN ORGANIZED HEALTH CARE EDUCATION/TRAINING PROGRAM

## 2023-06-22 PROCEDURE — 99999 PR PBB SHADOW E&M-EST. PATIENT-LVL III: ICD-10-PCS | Mod: PBBFAC,,, | Performed by: STUDENT IN AN ORGANIZED HEALTH CARE EDUCATION/TRAINING PROGRAM

## 2023-06-22 PROCEDURE — 1160F RVW MEDS BY RX/DR IN RCRD: CPT | Mod: CPTII,S$GLB,, | Performed by: STUDENT IN AN ORGANIZED HEALTH CARE EDUCATION/TRAINING PROGRAM

## 2023-06-22 PROCEDURE — 99999 PR PBB SHADOW E&M-EST. PATIENT-LVL III: CPT | Mod: PBBFAC,,, | Performed by: STUDENT IN AN ORGANIZED HEALTH CARE EDUCATION/TRAINING PROGRAM

## 2023-06-22 PROCEDURE — 4010F ACE/ARB THERAPY RXD/TAKEN: CPT | Mod: CPTII,S$GLB,, | Performed by: STUDENT IN AN ORGANIZED HEALTH CARE EDUCATION/TRAINING PROGRAM

## 2023-06-22 PROCEDURE — 1160F PR REVIEW ALL MEDS BY PRESCRIBER/CLIN PHARMACIST DOCUMENTED: ICD-10-PCS | Mod: CPTII,S$GLB,, | Performed by: STUDENT IN AN ORGANIZED HEALTH CARE EDUCATION/TRAINING PROGRAM

## 2023-06-22 PROCEDURE — 99214 PR OFFICE/OUTPT VISIT, EST, LEVL IV, 30-39 MIN: ICD-10-PCS | Mod: S$GLB,,, | Performed by: STUDENT IN AN ORGANIZED HEALTH CARE EDUCATION/TRAINING PROGRAM

## 2023-06-22 PROCEDURE — 1159F MED LIST DOCD IN RCRD: CPT | Mod: CPTII,S$GLB,, | Performed by: STUDENT IN AN ORGANIZED HEALTH CARE EDUCATION/TRAINING PROGRAM

## 2023-06-22 RX ORDER — TRETINOIN 0.6 MG/G
1 GEL TOPICAL NIGHTLY
Qty: 50 G | Refills: 5 | Status: SHIPPED | OUTPATIENT
Start: 2023-06-22

## 2023-06-22 NOTE — PROGRESS NOTES
Subjective:       Patient ID:  Niesha Abarca is a 42 y.o. female who presents for   Chief Complaint   Patient presents with    Skin Check     FBSE     HPI     Denies personal hx of melanoma or NMSC. Unknown family hx of skin cancers. Patient here for skin check.    Review of Systems   Skin:  Positive for daily sunscreen use. Negative for itching, rash and recent sunburn.   Hematologic/Lymphatic: Does not bruise/bleed easily.      Objective:    Physical Exam   Constitutional: She appears well-developed and well-nourished. No distress.   Neurological: She is alert and oriented to person, place, and time. She is not disoriented.   Psychiatric: She has a normal mood and affect.   Skin:   Areas Examined (abnormalities noted in diagram):   Head / Face Inspection Performed  Neck Inspection Performed  Chest / Axilla Inspection Performed  Back Inspection Performed  RUE Inspected  LUE Inspection Performed                     Diagram Legend     Erythematous scaling macule/papule c/w actinic keratosis       Vascular papule c/w angioma      Pigmented verrucoid papule/plaque c/w seborrheic keratosis      Yellow umbilicated papule c/w sebaceous hyperplasia      Irregularly shaped tan macule c/w lentigo     1-2 mm smooth white papules consistent with Milia      Movable subcutaneous cyst with punctum c/w epidermal inclusion cyst      Subcutaneous movable cyst c/w pilar cyst      Firm pink to brown papule c/w dermatofibroma      Pedunculated fleshy papule(s) c/w skin tag(s)      Evenly pigmented macule c/w junctional nevus     Mildly variegated pigmented, slightly irregular-bordered macule c/w mildly atypical nevus      Flesh colored to evenly pigmented papule c/w intradermal nevus       Pink pearly papule/plaque c/w basal cell carcinoma      Erythematous hyperkeratotic cursted plaque c/w SCC      Surgical scar with no sign of skin cancer recurrence      Open and closed comedones      Inflammatory papules and pustules       Verrucoid papule consistent consistent with wart     Erythematous eczematous patches and plaques     Dystrophic onycholytic nail with subungual debris c/w onychomycosis     Umbilicated papule    Erythematous-base heme-crusted tan verrucoid plaque consistent with inflamed seborrheic keratosis     Erythematous Silvery Scaling Plaque c/w Psoriasis     See annotation      Assessment / Plan:        Skin cancer screening  Total body skin examination performed today including at least 12 points as noted in physical examination. No lesions suspicious for malignancy noted.    Recommend daily sun protection/avoidance, use of at least SPF 30, broad spectrum sunscreen (OTC drug), skin self examinations, and routine physician surveillance to optimize early detection    Multiple benign nevi  Discussed ABCDE's of nevi.  Monitor for new mole or moles that are becoming bigger, darker, irritated, or developing irregular borders. Brochure provided. Instructed patient to observe lesion(s) for changes and follow up in clinic if changes are noted. Patient to monitor skin at home for new or changing lesions.     Hemangioma, unspecified site  This is a benign vascular lesion. Reassurance given. No treatment required.     Seborrheic keratosis  These are benign inherited growths without a malignant potential. Reassurance given to patient. No treatment is necessary.     Solar lentigo  -     tretinoin microspheres (RETIN-A MICRO PUMP) 0.06 % GlwP; Apply 1 application topically every evening.  Dispense: 50 g; Refill: 5            LOS NUMBER AND COMPLEXITY OF PROBLEMS    COMPLEXITY OF DATA RISK TOTAL TIME (m)   37469  [id number] [] 1 self-limited or minor problem [x] Minimal to none [] No treatment recommended or patient to monitor 15-29  10-19   40160  [id number] Low  [] 2 or > self limited or minor problems  [] 1 stable chronic illness  [] 1 acute, uncomplicated illness or injury Limited (2)  [] Prior external notes from each unique source  [] Review  result of each unique test  [] Order each unique test []  Low  OTC medications, minor skin biopsy 30-44  20-29   06425  15412 Moderate  []  1 or > chronic illness with progression, exacerbation or SE of treatment  [x]  2 or more stable chronic illnesses  []  1 acute illness with systemic symptoms  []  1 acute complicated injury  []  1 undiagnosed new problem with uncertain prognosis Moderate (1/3 below)  []  3 or more data items        *Now includes assessment requiring independent historian  []  Independent interpretation of a test  []  Discuss management/test with another provider Moderate  [x]  Prescription drug mgmt  []  Minor surgery with risk discussed  []  Mgmt limited by social determinates 45-59  30-39   76154  86708 High  []  1 or more chronic illness with severe exacerbation, progression or SE of treatment  []  1 acute or chronic illness/injury that poses a threat to life or bodily function Extensive (2/3 below)  []  3 or more data items        *Now includes assessment requiring independent historian.  []  Independent interpretation of a test  []  Discuss management/test with another provider High  []  Major surgery with risk discussed  []  Drug therapy requiring intensive monitoring for toxicity  []  Hospitalization  []  Decision for DNR 60-74  40-54

## 2023-06-23 ENCOUNTER — SPECIALTY PHARMACY (OUTPATIENT)
Dept: PHARMACY | Facility: CLINIC | Age: 43
End: 2023-06-23
Payer: COMMERCIAL

## 2023-06-23 NOTE — TELEPHONE ENCOUNTER
Specialty Pharmacy - Initial Clinical Assessment    Specialty Medication Orders Linked to Encounter      Flowsheet Row Most Recent Value   Medication #1 evolocumab (REPATHA SURECLICK) 140 mg/mL PnIj (Order#[id number], Rx#)          Patient Diagnosis   E78.2 - Mixed hyperlipidemia  Z78.9 - Statin intolerance    Subjective    Niesha Abarca is a 42 y.o. female, who is followed by the specialty pharmacy service for management and education.    Recent Encounters       Date Type Provider Description    06/23/2023 Specialty Pharmacy Yulia Rollins, Da Initial Clinical Assessment    06/22/2023 Specialty Pharmacy Yulia Rollins, DavianD Referral Authorization    07/22/2022 Specialty Pharmacy Cielo Benton Refill Coordination    06/23/2022 Specialty Pharmacy Madison Ramey, PharmD Refill Coordination    05/12/2022 Specialty Pharmacy Yenifer Domingo, PharmD Refill Coordination            Current Outpatient Medications   Medication Sig    amLODIPine (NORVASC) 5 MG tablet Take 1 tablet (5 mg total) by mouth 2 (two) times a day.    buPROPion (WELLBUTRIN XL) 150 MG TB24 tablet TAKE 1 TABLET(150 MG) BY MOUTH EVERY DAY    busPIRone (BUSPAR) 10 MG tablet Take 1 tablet (10 mg total) by mouth 2 (two) times daily.    doxylamine-phenylephrine (POLY HIST FORTE) 10.5-10 mg Tab Take 1 tablet by mouth every 6 to 8 hours as needed (Cough/congestion).    evolocumab (REPATHA SURECLICK) 140 mg/mL PnIj Inject 1 mL (140 mg total) into the skin every 14 (fourteen) days.    evolocumab (REPATHA SURECLICK) 140 mg/mL PnIj Inject 1 mL (140 mg total) into the skin every 14 (fourteen) days.    fluticasone propionate (FLONASE) 50 mcg/actuation nasal spray 1 spray (50 mcg total) by Each Nostril route once daily.    ibuprofen (ADVIL,MOTRIN) 600 MG tablet     losartan (COZAAR) 50 MG tablet Take 1 tablet (50 mg total) by mouth every evening.    pantoprazole (PROTONIX) 40 MG tablet Take 1 tablet (40 mg total) by mouth once daily.    tretinoin  microspheres (RETIN-A MICRO PUMP) 0.06 % GlwP Apply 1 application topically every evening.    valACYclovir (VALTREX) 1000 MG tablet Take 2 tablets (2,000 mg total) by mouth every 12 (twelve) hours. for 2 days   Last reviewed on 6/22/2023  1:36 PM by Carlee Patino MD    Review of patient's allergies indicates:  No Known AllergiesLast reviewed on  6/22/2023 1:36 PM by Carlee Patino          Assessment Questions - Documented Responses      Flowsheet Row Most Recent Value   Assessment    Medication Reconciliation completed for patient No   During the past 4 weeks, has patient missed any activities due to condition or medication? No   During the past 4 weeks, did patient have any of the following urgent care visits? None   Goals of Therapy Status Discussed (new start)   Status of the patients ability to self-administer: Is Able   All education points have been covered with patient? No, patient declined- printed education provided   Welcome packet contents reviewed and discussed with patient? No   Assesment completed? Yes   Plan Therapy continued   Do you need to open a clinical intervention (i-vent)? Yes   Do you want to schedule first shipment? Yes   Medication #1 Assessment Info    Patient status Existing medication, Exisiting to [facility]   Is this medication appropriate for the patient? Yes   Is this medication effective? Yes          Refill Questions - Documented Responses      Flowsheet Row Most Recent Value   Patient Availability and HIPAA Verification    Does patient want to proceed with activity? Yes   HIPAA/medical authority confirmed? Yes   Relationship to patient of person spoken to? Self   Refill Screening Questions    When does the patient need to receive the medication? 06/27/23   Refill Delivery Questions    How will the patient receive the medication? MEDRx   When does the patient need to receive the medication? 06/27/23   Shipping Address Home   Address in [facility] confirmed and updated if neccessary?  "Yes   Expected Copay ($) 5   Is the patient able to afford the medication copay? Yes   Payment Method CC on file   Days supply of Refill 28   Supplies needed? No supplies needed   Refill activity completed? Yes   Refill activity plan Refill scheduled   Shipment/Pickup Date: 06/26/23            Objective    She has a past medical history of Anxiety, Essential hypertension (07/24/2019), Hyperlipidemia, and IBS (irritable bowel syndrome).    Tried/failed medications:     BP Readings from Last 4 Encounters:   06/21/23 133/84   06/16/23 128/82   04/18/23 (!) 151/88   12/15/22 136/89     Ht Readings from Last 4 Encounters:   06/21/23 5' 5" (1.651 m)   06/16/23 5' 5.5" (1.664 m)   04/18/23 5' 5.5" (1.664 m)   12/15/22 5' 5" (1.651 m)     Wt Readings from Last 4 Encounters:   06/21/23 113.7 kg (250 lb 10.6 oz)   06/21/23 113 kg (249 lb 1.9 oz)   06/16/23 111.7 kg (246 lb 4.1 oz)   04/18/23 108.9 kg (240 lb)       The goals of prescribed drug therapy management include:  Supporting patient to meet the prescriber's medical treatment objectives  Improving or maintaining quality of life  Maintaining optimal therapy adherence  Minimizing and managing side effects      Goals of Therapy Status: Discussed (new start)    Assessment/Plan  Patient plans to continue therapy without changes      Indication, dosage, appropriateness, effectiveness, safety and convenience of her specialty medication(s) were reviewed today.     Patient Education   Pharmacist offer to  patient was declined. Printed educational materials will be provided with medication.      Tasks added this encounter   No tasks added.   Tasks due within next 3 months   No tasks due.     Yulia Rollins, PharmD  [facility]  [address] [facility] [address]  Phone: [phone]  Fax: [phone]  "

## 2023-06-27 ENCOUNTER — PATIENT MESSAGE (OUTPATIENT)
Dept: INTERNAL MEDICINE | Facility: CLINIC | Age: 43
End: 2023-06-27
Payer: COMMERCIAL

## 2023-06-27 ENCOUNTER — PATIENT MESSAGE (OUTPATIENT)
Dept: CARDIOLOGY | Facility: CLINIC | Age: 43
End: 2023-06-27
Payer: COMMERCIAL

## 2023-06-27 ENCOUNTER — PATIENT MESSAGE (OUTPATIENT)
Dept: SURGERY | Facility: CLINIC | Age: 43
End: 2023-06-27
Payer: COMMERCIAL

## 2023-06-29 ENCOUNTER — TELEPHONE (OUTPATIENT)
Dept: INTERNAL MEDICINE | Facility: CLINIC | Age: 43
End: 2023-06-29
Payer: COMMERCIAL

## 2023-06-29 NOTE — TELEPHONE ENCOUNTER
----- Message from Kuldip Bentley MD sent at 6/19/2023  8:42 AM CDT -----  Forward labs and my note to Dr. Carias

## 2023-07-21 RX ORDER — VALACYCLOVIR HYDROCHLORIDE 1 G/1
2000 TABLET, FILM COATED ORAL EVERY 12 HOURS
Qty: 8 TABLET | Refills: 1 | Status: SHIPPED | OUTPATIENT
Start: 2023-07-21 | End: 2023-12-21 | Stop reason: SDUPTHER

## 2023-07-27 ENCOUNTER — SPECIALTY PHARMACY (OUTPATIENT)
Dept: PHARMACY | Facility: CLINIC | Age: 43
End: 2023-07-27
Payer: COMMERCIAL

## 2023-07-27 NOTE — TELEPHONE ENCOUNTER
Specialty Pharmacy - Refill Coordination    Specialty Medication Orders Linked to Encounter      Flowsheet Row Most Recent Value   Medication #1 evolocumab (REPATHA SURECLICK) 140 mg/mL PnIj (Order#[id number], Rx#[id number])          Refill Questions - Documented Responses      Flowsheet Row Most Recent Value   Patient Availability and HIPAA Verification    Does patient want to proceed with activity? Unable to Reach          We have had multiple attempts to the patient and have been unsuccessful to reach the patient. We will stop reaching out to the patient but in the event that the patient needs the med and contacts us, we will communicate and begin dispensing for the patient. At your next visit with the patient, please review the importance of being in contact with our specialty pharmacy as a part of our care team.      Yulia Rollins, PharmD  [facility]  [address] [facility] [address]  Phone: [phone]  Fax: [phone]

## 2023-08-24 ENCOUNTER — OFFICE VISIT (OUTPATIENT)
Dept: URGENT CARE | Facility: CLINIC | Age: 43
End: 2023-08-24
Payer: COMMERCIAL

## 2023-08-24 VITALS
TEMPERATURE: 102 F | HEART RATE: 109 BPM | RESPIRATION RATE: 21 BRPM | BODY MASS INDEX: 40.32 KG/M2 | SYSTOLIC BLOOD PRESSURE: 137 MMHG | DIASTOLIC BLOOD PRESSURE: 80 MMHG | OXYGEN SATURATION: 100 % | WEIGHT: 242 LBS | HEIGHT: 65 IN

## 2023-08-24 DIAGNOSIS — U07.1 COVID-19 VIRUS INFECTION: Primary | ICD-10-CM

## 2023-08-24 DIAGNOSIS — U07.1 COVID-19 VIRUS DETECTED: ICD-10-CM

## 2023-08-24 DIAGNOSIS — J02.0 STREP PHARYNGITIS: ICD-10-CM

## 2023-08-24 DIAGNOSIS — J02.9 SORE THROAT: ICD-10-CM

## 2023-08-24 LAB
CTP QC/QA: YES
CTP QC/QA: YES
MOLECULAR STREP A: POSITIVE
SARS-COV-2 AG RESP QL IA.RAPID: POSITIVE

## 2023-08-24 PROCEDURE — 87811 SARS CORONAVIRUS 2 ANTIGEN POCT, MANUAL READ: ICD-10-PCS | Mod: QW,S$GLB,, | Performed by: PHYSICIAN ASSISTANT

## 2023-08-24 PROCEDURE — 87811 SARS-COV-2 COVID19 W/OPTIC: CPT | Mod: QW,S$GLB,, | Performed by: PHYSICIAN ASSISTANT

## 2023-08-24 PROCEDURE — 87651 STREP A DNA AMP PROBE: CPT | Mod: QW,S$GLB,, | Performed by: PHYSICIAN ASSISTANT

## 2023-08-24 PROCEDURE — 99214 PR OFFICE/OUTPT VISIT, EST, LEVL IV, 30-39 MIN: ICD-10-PCS | Mod: S$GLB,,, | Performed by: PHYSICIAN ASSISTANT

## 2023-08-24 PROCEDURE — 99214 OFFICE O/P EST MOD 30 MIN: CPT | Mod: S$GLB,,, | Performed by: PHYSICIAN ASSISTANT

## 2023-08-24 PROCEDURE — 87651 POCT STREP A MOLECULAR: ICD-10-PCS | Mod: QW,S$GLB,, | Performed by: PHYSICIAN ASSISTANT

## 2023-08-24 RX ORDER — AMOXICILLIN 500 MG/1
500 CAPSULE ORAL EVERY 12 HOURS
Qty: 20 CAPSULE | Refills: 0 | Status: SHIPPED | OUTPATIENT
Start: 2023-08-24 | End: 2023-09-03

## 2023-08-24 NOTE — PATIENT INSTRUCTIONS
You have tested positive for COVID-19 today.      Please note that patients who test positive for COVID-19 are required by the CDC to undergo isolation for 5 days after their symptoms first began.   - If you tested positive and do not have symptoms, you must isolate for 5 days starting on the day of the positive test.   - If you tested positive and have symptoms, you must isolate for 5 days starting on the day of the first symptoms,  not the day of the positive test.    This is the most important part, both the CDC and the [facility] emphasize that you do not test out of isolation.  In fact, we do not retest if you were positive in the last 90 days.    After 5 days, if your symptoms have improved and you have not had fever on day 5, you can return to the community on day 6- NO TESTING REQUIRED!     After your 5 days of isolation are completed, the CDC recommends strict mask use for the first 5 days that you come out of isolation.     During quarantine:   Separate yourself from other people and animals in your home.  Call ahead before visiting your doctor.  Wear a facemask.  Cover your coughs and sneezes.  Wash your hands often with soap and water; hand  can be used, too.  Avoid sharing personal household items.  Wipe down surfaces used daily.  Monitor your symptoms. Seek prompt medical attention if your illness is worsening (e.g., difficulty breathing).   Before seeking care, call your healthcare provider.  If you have a medical emergency and need to call 911, notify the dispatch personnel that you have, or are being evaluated for COVID-19. If possible, put on a facemask before emergency medical services arrive.         CDC Testing and Quarantine Guidelines for household members, intimate partners, and caregivers in a home setting of a known-positive COVID-19 person:    ·     A 'close exposure' is defined as anyone who has had an exposure (masked or unmasked) to a known COVID -19 positive person within 6 feet of  someone for a cumulative total of 15 minutes or more over a 24-hour period.    ·     Vaccinated: patients who have been boosted or completed the primary series of Pfizer or Moderna vaccine within the last 6 months or completed the primary series of J&J vaccine within the last 2 months and/or had a positive test within 90 days   - do NOT need to quarantine after contact with someone who had COVID-19 unless they have symptoms.   - should get tested 3-5 days after their exposure (if they have not had a positive test within the last 90 days), even if they don't have symptoms and wear a mask indoors in public for 10 days following exposure or until their test result is negative on day 5.  - If you develop symptoms test and quarantine.    ·     Unvaccinated, or are more than six months out from their second mRNA dose (or more than 2 months after the J&J vaccine) and not yet boosted,  and/or NOT had a positive test within 90 days and meet 'close exposure'  - you are required by CDC guidelines to quarantine for at least 5 days from time of exposure followed by 5 days of strict masking. It is recommended, but not required to test after 5 days, unless you develop symptoms, in which case you should test at that time.  - If you do decide to test at 5 days and are asymptomatic, the risk is that if you test without symptoms (on Day 5 for example) and you are positive, your 5 day isolation begins on that day, and you turned your 5 day quarantine into 10 days.  - If your exposure does not meet the above definition, you can return to your normal daily activities to include social distancing, wearing a mask and frequent handwashing.       Close contacts should also follow these recommendations:  Make sure that you understand and can help the patient follow their provider's instructions for medication(s) and care. You should help the patient with basic needs in the home and provide support for getting groceries, prescriptions, and  other personal needs.  Monitor the patient's symptoms. If the patient is getting sicker, call his or her healthcare provider and tell them that the patient has laboratory-confirmed COVID-19. If the patient has a medical emergency and you need to call 911, notify the dispatch personnel that the patient has, or is being evaluated for COVID-19.  Household members should stay in another room or be  from the patient. Household members should use a separate bedroom and bathroom, if available.  Prohibit visitors.  Household members should care for any pets in the home.  Make sure that shared spaces in the home have good air flow, such as by an air conditioner or an opened window, weather permitting.  Perform hand hygiene frequently. Wash your hands often with soap and water for at least 20 seconds or use an alcohol-based hand  (that contains > 60% alcohol) covering all surfaces of your hands and rubbing them together until they feel dry. Soap and water should be used preferentially.  Avoid touching your eyes, nose, and mouth.  The patient should wear a facemask. If the patient is not able to wear a facemask (for example, because it causes trouble breathing), caregivers should wear a mask when they are in the same room as the patient.  Wear a disposable facemask and gloves when you touch or have contact with the patient's blood, stool, or body fluids, such as saliva, sputum, nasal mucus, vomit, urine.  Throw out disposable facemasks and gloves after using them. Do not reuse.  When removing personal protective equipment, first remove and dispose of gloves. Then, immediately clean your hands with soap and water or alcohol-based hand . Next, remove and dispose of facemask, and immediately clean your hands again with soap and water or alcohol-based hand .  You should not share dishes, drinking glasses, cups, eating utensils, towels, bedding, or other items with the patient. After the patient  uses these items, you should wash them thoroughly (see below Wash laundry thoroughly).  Clean all high-touch surfaces, such as counters, tabletops, doorknobs, bathroom fixtures, toilets, phones, keyboards, tablets, and bedside tables, every day. Also, clean any surfaces that may have blood, stool, or body fluids on them.  Use a household cleaning spray or wipe, according to the label instructions. Labels contain instructions for safe and effective use of the cleaning product including precautions you should take when applying the product, such as wearing gloves and making sure you have good ventilation during use of the product.  Wash laundry thoroughly.  Immediately remove and wash clothes or bedding that have blood, stool, or body fluids on them.  Wear disposable gloves while handling soiled items and keep soiled items away from your body. Clean your hands (with soap and water or an alcohol-based hand ) immediately after removing your gloves.  Read and follow directions on labels of laundry or clothing items and detergent. In general, using a normal laundry detergent according to washing machine instructions and dry thoroughly using the warmest temperatures recommended on the clothing label.  Place all used disposable gloves, facemasks, and other contaminated items in a lined container before disposing of them with other household waste. Clean your hands (with soap and water or an alcohol-based hand ) immediately after handling these items. Soap and water should be used preferentially if hands are visibly dirty.  Discuss any additional questions with your state or local health department or healthcare provider. Check available hours when contacting your local health department.     You must understand that you've received an Urgent Care treatment only and that you may be released before all your medical problems are known or treated. You, the patient, will arrange for follow up care as  instructed. Follow up with your PCP or specialty clinic as directed within 2-5 days if not improved or as needed.  You can call [phone] to schedule an appointment with the appropriate provider.  If your condition worsens we recommend that you receive another evaluation at the emergency room immediately or contact your primary medical clinics after hours call service to discuss your concerns.  Please return here or go to the Emergency Department for any concerns or worsening of condition.       If we discussed the COVID surveillance/home monitoring program, you will also get a call from [facility] pharmacy at the [place] or [facility] location to get a pulse oximeter to monitor your blood oxygen level.  This will be followed by a COVID surveillance team daily through [employer] (available on computer or through mobile michelle).

## 2023-08-24 NOTE — LETTER
[address] ? [place], [address] ? Phone [phone] ? Fax             Return to Work/School    Patient: Niesha Abarca  YOB: 1980   Date: 08/24/2023      To Whom It May Concern:     Niesha Abarca was in contact with/seen in my office on 08/24/2023. COVID-19 is present in our communities across the state. Not all patients are eligible or appropriate to be tested. In this situation, she meets the following criteria:     Niesha Abarca has met the criteria for COVID-19 testing and has a POSITIVE result. She can return to work/school once they are asymptomatic for 24 hours without the use of fever reducing medications AND at least 5 days from the start of symptoms- May return on 08/29/23.  Patient does NOT need to be re-tested to return to work/school.        If you have any questions or concerns, or if I can be of further assistance, please do not hesitate to contact me.     Sincerely,    Francoise Grossman PA-C

## 2023-08-24 NOTE — PROGRESS NOTES
"Subjective:      Patient ID: Niesha Abarca is a 42 y.o. female.    Vitals:  height is 5' 5" (1.651 m) and weight is 109.8 kg (242 lb). Her oral temperature is 102.1 °F (38.9 °C) (abnormal). Her blood pressure is 137/80 and her pulse is 109. Her respiration is 21 (abnormal) and oxygen saturation is 100%.     Chief Complaint: Sore Throat (X2days)    Patient presented here today with sore throat, congestion, headache x 2 days. Pain is more apparent on the left side of throat. Pt felt worse today and started having chills. She states temp was taken today while at work and was 101.4. 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 Took cassie selzter earlier today.     Sore Throat   The current episode started yesterday. The problem has been unchanged. The pain is worse on the left side. The maximum temperature recorded prior to her arrival was 101 - 101.9 F. The fever has been present for Less than 1 day. The pain is at a severity of 6/10. Associated symptoms include congestion, headaches and trouble swallowing. Pertinent negatives include no abdominal pain, coughing, diarrhea, drooling, ear discharge, ear pain, hoarse voice, plugged ear sensation, neck pain, shortness of breath, stridor, swollen glands or vomiting. She has had no exposure to strep or mono. She has tried nothing for the symptoms.       Constitution: Positive for chills, fatigue and fever.   HENT:  Positive for congestion, sore throat and trouble swallowing. Negative for ear pain, ear discharge and drooling.    Neck: Negative for neck pain.   Cardiovascular: Negative.    Respiratory:  Negative for cough, shortness of breath and stridor.    Gastrointestinal:  Negative for abdominal pain, nausea, vomiting and diarrhea.   Musculoskeletal:  Positive for muscle ache.   Neurological:  Positive for headaches. Negative for dizziness and light-headedness.      Objective:     Physical Exam   Constitutional: She appears well-developed.  " Non-toxic appearance. She does not appear ill. No distress.   HENT:   Head: Normocephalic and atraumatic.   Ears:   Right Ear: External ear normal.   Left Ear: External ear normal.   Nose: Congestion present.   Mouth/Throat: Uvula is midline. Posterior oropharyngeal erythema present. No oropharyngeal exudate or tonsillar abscesses. Tonsils are 1+ on the right. Tonsils are 1+ on the left. Tonsillar exudate (left).   Eyes: Conjunctivae and EOM are normal.   Neck: Neck supple.   Cardiovascular: Normal rate and regular rhythm.   Pulmonary/Chest: Effort normal and breath sounds normal.   Abdominal: Normal appearance.   Musculoskeletal: Normal range of motion.         General: Normal range of motion.   Lymphadenopathy:     She has cervical adenopathy.   Neurological: no focal deficit. She is alert. She displays no weakness. Gait normal.   Skin: Skin is warm, dry, not diaphoretic, not pale and no rash.   Psychiatric: Her behavior is normal.     Results for orders placed or performed in visit on 08/24/23   SARS Coronavirus 2 Antigen, POCT Manual Read   Result Value Ref Range    SARS Coronavirus 2 Antigen Positive (A) Negative     Acceptable Yes    POCT Strep A, Molecular   Result Value Ref Range    Molecular Strep A, POC Positive (A) Negative     Acceptable Yes          Assessment:     1. COVID-19 virus infection    2. Sore throat    3. Strep pharyngitis        Plan:     - COVID-19 & strep both positive    - Covid Risk Score: 2 Pt is considered low risk (score < 3) and therefore does not meet criteria for Paxlovid.  - Advised patient to stay home and self quarantine for 5 days from onset of symptoms. Advised must be fever free for at least 24 hours to discontinue isolation.  - Full course of abx to treat strep throat.   - Supportive care for sore throat (salt water gargles, lozenges, chloraseptic spray, cough drops, warm tea, etc.).   - Tylenol and/or NSAIDs as needed for fever/pain control.    - OTC medications prn for cold symptoms.   - Rest and drink plenty of fluids.  - Strict ED precautions given for any emergent symptoms.    COVID-19 virus infection    Sore throat  -     SARS Coronavirus 2 Antigen, POCT Manual Read  -     POCT Strep A, Molecular    Strep pharyngitis  -     amoxicillin (AMOXIL) 500 MG capsule; Take 1 capsule (500 mg total) by mouth every 12 (twelve) hours. for 10 days  Dispense: 20 capsule; Refill: 0

## 2023-10-10 ENCOUNTER — HOSPITAL ENCOUNTER (OUTPATIENT)
Dept: RADIOLOGY | Facility: HOSPITAL | Age: 43
Discharge: HOME | End: 2023-10-10
Attending: SURGERY
Payer: COMMERCIAL

## 2023-10-10 ENCOUNTER — PATIENT MESSAGE (OUTPATIENT)
Dept: SURGERY | Facility: CLINIC | Age: 43
End: 2023-10-10
Payer: COMMERCIAL

## 2023-10-10 DIAGNOSIS — Z12.31 ENCOUNTER FOR SCREENING MAMMOGRAM FOR BREAST CANCER: ICD-10-CM

## 2023-10-10 PROCEDURE — 77063 BREAST TOMOSYNTHESIS BI: CPT | Mod: 26,,, | Performed by: RADIOLOGY

## 2023-10-10 PROCEDURE — 77063 MAMMO DIGITAL SCREENING BILAT WITH TOMO: ICD-10-PCS | Mod: 26,,, | Performed by: RADIOLOGY

## 2023-10-10 PROCEDURE — 77067 SCR MAMMO BI INCL CAD: CPT | Mod: TC,PO

## 2023-10-10 PROCEDURE — 77067 MAMMO DIGITAL SCREENING BILAT WITH TOMO: ICD-10-PCS | Mod: 26,,, | Performed by: RADIOLOGY

## 2023-10-10 PROCEDURE — 77067 SCR MAMMO BI INCL CAD: CPT | Mod: 26,,, | Performed by: RADIOLOGY

## 2023-12-06 ENCOUNTER — PATIENT MESSAGE (OUTPATIENT)
Dept: INTERNAL MEDICINE | Facility: CLINIC | Age: 43
End: 2023-12-06
Payer: COMMERCIAL

## 2023-12-09 NOTE — TELEPHONE ENCOUNTER
No care due was identified.  [facility] Embedded Care Due Messages. Reference number: [id number].   12/09/2023 7:26:03 AM CST

## 2023-12-11 RX ORDER — BUSPIRONE HYDROCHLORIDE 10 MG/1
10 TABLET ORAL 2 TIMES DAILY
Qty: 60 TABLET | Refills: 6 | Status: SHIPPED | OUTPATIENT
Start: 2023-12-11 | End: 2024-12-10

## 2023-12-21 ENCOUNTER — OFFICE VISIT (OUTPATIENT)
Dept: INTERNAL MEDICINE | Facility: CLINIC | Age: 43
End: 2023-12-21
Payer: COMMERCIAL

## 2023-12-21 DIAGNOSIS — I10 HYPERTENSION, UNSPECIFIED TYPE: Primary | ICD-10-CM

## 2023-12-21 DIAGNOSIS — F41.1 GAD (GENERALIZED ANXIETY DISORDER): ICD-10-CM

## 2023-12-21 DIAGNOSIS — E66.01 MORBID OBESITY WITH BMI OF 40.0-44.9, ADULT: ICD-10-CM

## 2023-12-21 DIAGNOSIS — B00.1 FEVER BLISTER: ICD-10-CM

## 2023-12-21 PROCEDURE — 1160F PR REVIEW ALL MEDS BY PRESCRIBER/CLIN PHARMACIST DOCUMENTED: ICD-10-PCS | Mod: CPTII,95,, | Performed by: FAMILY MEDICINE

## 2023-12-21 PROCEDURE — 1159F MED LIST DOCD IN RCRD: CPT | Mod: CPTII,95,, | Performed by: FAMILY MEDICINE

## 2023-12-21 PROCEDURE — 4010F ACE/ARB THERAPY RXD/TAKEN: CPT | Mod: CPTII,95,, | Performed by: FAMILY MEDICINE

## 2023-12-21 PROCEDURE — 99214 PR OFFICE/OUTPT VISIT, EST, LEVL IV, 30-39 MIN: ICD-10-PCS | Mod: 95,,, | Performed by: FAMILY MEDICINE

## 2023-12-21 PROCEDURE — 99214 OFFICE O/P EST MOD 30 MIN: CPT | Mod: 95,,, | Performed by: FAMILY MEDICINE

## 2023-12-21 PROCEDURE — 4010F PR ACE/ARB THEARPY RXD/TAKEN: ICD-10-PCS | Mod: CPTII,95,, | Performed by: FAMILY MEDICINE

## 2023-12-21 PROCEDURE — 1160F RVW MEDS BY RX/DR IN RCRD: CPT | Mod: CPTII,95,, | Performed by: FAMILY MEDICINE

## 2023-12-21 PROCEDURE — 1159F PR MEDICATION LIST DOCUMENTED IN MEDICAL RECORD: ICD-10-PCS | Mod: CPTII,95,, | Performed by: FAMILY MEDICINE

## 2023-12-21 RX ORDER — BUPROPION HYDROCHLORIDE 150 MG/1
150 TABLET ORAL DAILY
Qty: 90 TABLET | Refills: 3 | Status: SHIPPED | OUTPATIENT
Start: 2023-12-21

## 2023-12-21 RX ORDER — VALACYCLOVIR HYDROCHLORIDE 1 G/1
2000 TABLET, FILM COATED ORAL EVERY 12 HOURS
Qty: 8 TABLET | Refills: 3 | Status: SHIPPED | OUTPATIENT
Start: 2023-12-21

## 2023-12-21 RX ORDER — TIRZEPATIDE 2.5 MG/.5ML
2.5 INJECTION, SOLUTION SUBCUTANEOUS
Qty: 4 PEN | Refills: 3 | Status: SHIPPED | OUTPATIENT
Start: 2023-12-21

## 2023-12-21 NOTE — PROGRESS NOTES
Subjective:      Patient ID: Niesha Abarca is a 43 y.o. female.    Chief Complaint: Med refills    HPI  44 yo here via telemed.  Needs refill on valtrex for fever blisters.  Also, wants to see if zepbound would be an issue.  Has HTN  Stable on wellbutrin//using buspar PRN    The patient location is: School  The chief complaint leading to consultation is: Med check    Visit type: audiovisual    Face to Face time with patient: 15 mins   minutes of total time spent on the encounter, which includes face to face time and non-face to face time preparing to see the patient (eg, review of tests), Obtaining and/or reviewing separately obtained history, Documenting clinical information in the electronic or other health record, Independently interpreting results (not separately reported) and communicating results to the patient/family/caregiver, or Care coordination (not separately reported).         Each patient to whom he or she provides medical services by telemedicine is:  (1) informed of the relationship between the physician and patient and the respective role of any other health care provider with respect to management of the patient; and (2) notified that he or she may decline to receive medical services by telemedicine and may withdraw from such care at any time.    Notes:      Past Medical History:   Diagnosis Date    Anxiety     Essential hypertension 2019    Hyperlipidemia     IBS (irritable bowel syndrome)      Family History   Adopted: Yes   Problem Relation Age of Onset    No Known Problems Mother     No Known Problems Father     Ovarian cancer Paternal Grandmother     Breast cancer Paternal Grandmother      Past Surgical History:   Procedure Laterality Date    APPENDECTOMY       SECTION      COLONOSCOPY N/A 2022    Procedure: COLONOSCOPY;  Surgeon: Tosin Yoo MD;  Location: [facility];  Service: Endoscopy;  Laterality: N/A;    REDUCTION OF BOTH BREASTS Bilateral 2023    TOTAL  REDUCTION MAMMOPLASTY Bilateral     2023    WISDOM TOOTH EXTRACTION       Social History     Tobacco Use    Smoking status: Former     Current packs/day: 0.00     Average packs/day: 0.5 packs/day for 15.0 years (7.5 ttl pk-yrs)     Types: Cigarettes     Start date: 2003     Quit date: 2018     Years since quittin.6     Passive exposure: Never    Smokeless tobacco: Never   Substance Use Topics    Alcohol use: Not Currently    Drug use: No       There were no vitals taken for this visit.    Review of Systems   Constitutional:  Negative for activity change and unexpected weight change.   HENT:  Positive for rhinorrhea and trouble swallowing. Negative for hearing loss.    Eyes:  Negative for discharge and visual disturbance.   Respiratory:  Negative for chest tightness and wheezing.    Cardiovascular:  Negative for chest pain and palpitations.   Gastrointestinal:  Negative for blood in stool, constipation, diarrhea and vomiting.   Endocrine: Negative for polydipsia and polyuria.   Genitourinary:  Negative for difficulty urinating, dysuria, hematuria and menstrual problem.   Musculoskeletal:  Negative for arthralgias, joint swelling and neck pain.   Neurological:  Negative for weakness and headaches.   Psychiatric/Behavioral:  Negative for confusion and dysphoric mood.        Objective:     Physical Exam  Constitutional:       General: She is not in acute distress.     Appearance: She is well-developed. She is not diaphoretic.   HENT:      Head: Normocephalic.   Eyes:      Conjunctiva/sclera: Conjunctivae normal.   Pulmonary:      Effort: Pulmonary effort is normal. No tachypnea, accessory muscle usage or respiratory distress.   Musculoskeletal:      Cervical back: Neck supple.   Skin:     Coloration: Skin is not pale.   Neurological:      Mental Status: She is alert and oriented to person, place, and time.   Psychiatric:         Mood and Affect: Mood normal.         Behavior: Behavior normal.          Thought Content: Thought content normal.         Judgment: Judgment normal.         Lab Results   Component Value Date    WBC 9.24 06/16/2023    HGB 12.1 06/16/2023    HCT 38.5 06/16/2023     06/16/2023    CHOL 277 (H) 10/04/2022    TRIG 85 10/04/2022    HDL 51 10/04/2022    ALT 17 06/16/2023    AST 14 06/16/2023     (L) 06/16/2023    K 4.1 06/16/2023     06/16/2023    CREATININE 0.7 06/16/2023    BUN 13 06/16/2023    CO2 22 (L) 06/16/2023    TSH 1.568 10/04/2022    HGBA1C 5.4 10/04/2022       Assessment:     1. Hypertension, unspecified type    2. Fever blister    3. NATHAN (generalized anxiety disorder)    4. Morbid obesity with BMI of 40.0-44.9, adult         Plan:     Hypertension, unspecified type    Fever blister    NATHAN (generalized anxiety disorder)    Morbid obesity with BMI of 40.0-44.9, adult    Other orders  -     buPROPion (WELLBUTRIN XL) 150 MG TB24 tablet; Take 1 tablet (150 mg total) by mouth once daily.  Dispense: 90 tablet; Refill: 3  -     valACYclovir (VALTREX) 1000 MG tablet; Take 2 tablets (2,000 mg total) by mouth every 12 (twelve) hours.  Dispense: 8 tablet; Refill: 3  -     tirzepatide, weight loss, (ZEPBOUND) 2.5 mg/0.5 mL PnIj; Inject 2.5 mg into the skin every 7 days.  Dispense: 4 Pen; Refill: 3      Bp stable  Need weight loss/discussed zepbound//would be a good option//discussed SE.  Will start low dose if on plan.  Refill on valtrex  Anxiety stable/refill  Fu PRN

## 2023-12-28 ENCOUNTER — PATIENT MESSAGE (OUTPATIENT)
Dept: INTERNAL MEDICINE | Facility: CLINIC | Age: 43
End: 2023-12-28
Payer: COMMERCIAL

## 2024-01-03 ENCOUNTER — TELEPHONE (OUTPATIENT)
Dept: INTERNAL MEDICINE | Facility: CLINIC | Age: 44
End: 2024-01-03
Payer: COMMERCIAL

## 2024-02-29 ENCOUNTER — PATIENT MESSAGE (OUTPATIENT)
Dept: DERMATOLOGY | Facility: CLINIC | Age: 44
End: 2024-02-29
Payer: COMMERCIAL

## 2024-03-01 ENCOUNTER — OFFICE VISIT (OUTPATIENT)
Dept: DERMATOLOGY | Facility: CLINIC | Age: 44
End: 2024-03-01
Payer: COMMERCIAL

## 2024-03-01 DIAGNOSIS — L30.9 ECZEMA, UNSPECIFIED TYPE: Primary | ICD-10-CM

## 2024-03-01 DIAGNOSIS — L72.0 MILIA: ICD-10-CM

## 2024-03-01 PROCEDURE — 4010F ACE/ARB THERAPY RXD/TAKEN: CPT | Mod: CPTII,S$GLB,, | Performed by: STUDENT IN AN ORGANIZED HEALTH CARE EDUCATION/TRAINING PROGRAM

## 2024-03-01 PROCEDURE — 99999 PR PBB SHADOW E&M-EST. PATIENT-LVL III: CPT | Mod: PBBFAC,,, | Performed by: STUDENT IN AN ORGANIZED HEALTH CARE EDUCATION/TRAINING PROGRAM

## 2024-03-01 PROCEDURE — 1159F MED LIST DOCD IN RCRD: CPT | Mod: CPTII,S$GLB,, | Performed by: STUDENT IN AN ORGANIZED HEALTH CARE EDUCATION/TRAINING PROGRAM

## 2024-03-01 PROCEDURE — 1160F RVW MEDS BY RX/DR IN RCRD: CPT | Mod: CPTII,S$GLB,, | Performed by: STUDENT IN AN ORGANIZED HEALTH CARE EDUCATION/TRAINING PROGRAM

## 2024-03-01 PROCEDURE — 99214 OFFICE O/P EST MOD 30 MIN: CPT | Mod: S$GLB,,, | Performed by: STUDENT IN AN ORGANIZED HEALTH CARE EDUCATION/TRAINING PROGRAM

## 2024-03-01 RX ORDER — CLOBETASOL PROPIONATE 0.5 MG/G
OINTMENT TOPICAL 2 TIMES DAILY
Qty: 60 G | Refills: 3 | Status: SHIPPED | OUTPATIENT
Start: 2024-03-01

## 2024-03-01 NOTE — PROGRESS NOTES
Patient Information  Name: Niesha Abarca  : 1980  MRN: [id number]     Referring Physician:  Dr. Bauman ref. provider found   Primary Care Physician:  Kuldip John MD (Inactive)   Date of Visit: 2024      Subjective:       Niesha Abarca is a 43 y.o. female who presents for   Chief Complaint   Patient presents with    Rash     C/o rash on right shin        Patient with new complaint of lesion(s)  Location: right shin  Duration: off and on for 6 months  Symptoms: itching  Relieving factors/Previous treatments: none    Patient with new complaint of lesion(s)  Location: left upper eyelid  Duration: weeks  Symptoms: none  Relieving factors/Previous treatments: none      Patient was last seen:Visit date not found     Prior notes by myself reviewed.   Clinical documentation obtained by nursing staff reviewed.    Review of Systems   Skin:  Positive for itching and rash.        Objective:    Physical Exam   Constitutional: She appears well-developed and well-nourished. No distress.   Neurological: She is alert and oriented to person, place, and time. She is not disoriented.   Psychiatric: She has a normal mood and affect.   Skin:   Areas Examined (abnormalities noted in diagram):   Head / Face Inspection Performed  RLE Inspected                   Diagram Legend     Erythematous scaling macule/papule c/w actinic keratosis       Vascular papule c/w angioma      Pigmented verrucoid papule/plaque c/w seborrheic keratosis      Yellow umbilicated papule c/w sebaceous hyperplasia      Irregularly shaped tan macule c/w lentigo     1-2 mm smooth white papules consistent with Milia      Movable subcutaneous cyst with punctum c/w epidermal inclusion cyst      Subcutaneous movable cyst c/w pilar cyst      Firm pink to brown papule c/w dermatofibroma      Pedunculated fleshy papule(s) c/w skin tag(s)      Evenly pigmented macule c/w junctional nevus     Mildly variegated pigmented, slightly  irregular-bordered macule c/w mildly atypical nevus      Flesh colored to evenly pigmented papule c/w intradermal nevus       Pink pearly papule/plaque c/w basal cell carcinoma      Erythematous hyperkeratotic cursted plaque c/w SCC      Surgical scar with no sign of skin cancer recurrence      Open and closed comedones      Inflammatory papules and pustules      Verrucoid papule consistent consistent with wart     Erythematous eczematous patches and plaques     Dystrophic onycholytic nail with subungual debris c/w onychomycosis     Umbilicated papule    Erythematous-base heme-crusted tan verrucoid plaque consistent with inflamed seborrheic keratosis     Erythematous Silvery Scaling Plaque c/w Psoriasis     See annotation      No images are attached to the encounter or orders placed in the encounter.    [] Data reviewed  [] Independent review of test  [] Management discussed with another provider    Assessment / Plan:        Eczema, unspecified type  -     clobetasol 0.05% (TEMOVATE) 0.05 % Oint; Apply topically 2 (two) times daily. Use up to 2 weeks at a time  Dispense: 60 g; Refill: 3  Counseling on topical steroids:  Patient counseled that the prolonged use of topical steroids can result in the increased appearance superficial blood vessels (telangiectasias) lightening (hypopigmentation), and   thinning of the skin ( atrophy).  Patient understands to avoid using high potency steroids in skin folds, the groin or the face.  The patient verbalized understanding of proper use and possible adverse effects of topical steroids.  All patient's questions and concerns were addressed.    Milia   - minor problem and chronic.   Reassurance given to patient. No treatment necessary.              LOS NUMBER AND COMPLEXITY OF PROBLEMS    COMPLEXITY OF DATA RISK TOTAL TIME (m)   02352  99062 [] 1 self-limited or minor problem [x] Minimal to none [] No treatment recommended or patient to monitor 15-29  10-19   46228  68139 Low  []  2 or > self limited or minor problems  [] 1 stable chronic illness  [] 1 acute, uncomplicated illness or injury Limited (2)  [] Prior external notes from each unique source  [] Review result of each unique test  [] Order each unique test []  Low  OTC medications, minor skin biopsy 30-44  20-29   90936  10662 Moderate  [x]  1 or > chronic illness with progression, exacerbation or SE of treatment  []  2 or more stable chronic illnesses  []  1 acute illness with systemic symptoms  []  1 acute complicated injury  []  1 undiagnosed new problem with uncertain prognosis Moderate (1/3 below)  []  3 or more data items        *Now includes assessment requiring independent historian  []  Independent interpretation of a test  []  Discuss management/test with another provider Moderate  [x]  Prescription drug mgmt  []  Minor surgery with risk discussed  []  Mgmt limited by social determinates 45-59  30-39   12737  46330 High  []  1 or more chronic illness with severe exacerbation, progression or SE of treatment  []  1 acute or chronic illness/injury that poses a threat to life or bodily function Extensive (2/3 below)  []  3 or more data items        *Now includes assessment requiring independent historian.  []  Independent interpretation of a test  []  Discuss management/test with another provider High  []  Major surgery with risk discussed  []  Drug therapy requiring intensive monitoring for toxicity  []  Hospitalization  []  Decision for DNR 60-74  40-54      No follow-ups on file.    Carlee Patino MD, FAAD  [facility]

## 2024-03-21 DIAGNOSIS — I10 ESSENTIAL HYPERTENSION: Primary | ICD-10-CM

## 2024-03-21 DIAGNOSIS — Z00.00 ROUTINE ADULT HEALTH MAINTENANCE: ICD-10-CM

## 2024-03-25 NOTE — PROGRESS NOTES
Breast Surgery  Department of General Surgery   [facility]    Date of Service: 2024    DIAGNOSIS:   This is a 43 y.o. female with lifetime risk of breast cancer >20%. F/u high risk clinic- 23.43%  Genetic testing: none -   2023 pt had andrew breast reduction    HISTORY OF PRESENT ILLNESS:   Niesha Abarca is a 43 y.o. female comes in for high-risk follow up.  She denies change in her breast self-exam specifically denying new masses, skin or nipple changes, or nipple discharge. Past medical and surgical history is updated - pt had bilateral breast reduction 2023. There have been no changes to family history. The patient denies constitutional symptoms of night sweats, weight loss, new headaches, visual changes, new back or bony pain, chest pain, or shortness of breath.      Her breast cancer risk factor profile is as follows: Menarche at 13, Menopause at N/A.  She is . Age at first live birth was 38. HRT: no She did not breastfeed.      Other breast cancer risk factors include family hx on father's side, menarche before age 12, family hx of maternal grandfather Prostate CA, and family hx of melanoma mother. Paternal grandmother breast cancer- ?, paternal great grandmother ovarian cancer ? age    IMAGING:     10/10/2023 andrew screening mammo  Result:   Mammo Digital Screening Bilat w/ Chuy     History:  Patient is 43 y.o. and is seen for a screening mammogram.     Films Compared:  Prior images (if available) were compared.     Findings:  This procedure was performed using tomosynthesis. Computer-aided detection was utilized in the interpretation of this examination.  The breasts have scattered areas of fibroglandular density.      Bilateral  There are post-surgical findings from a previous breast reduction seen in both breasts. This is a new finding. There has been no interval development of a suspicious mass, microcalcification, or architectural distortion.      There is no evidence of  suspicious masses, calcifications, or other abnormal findings.     Impression:  Bilateral  There is no mammographic evidence of malignancy.     BI-RADS Category:   Overall: 2 - Benign        Recommendation:  Routine screening mammogram in 1 year is recommended.        Your estimated lifetime risk of breast cancer (to age 85) based on Tyrer-Cuzick risk assessment model is Tyrer-Cuzick: 23.43 %. According to the [employer] Society, patients with a lifetime breast cancer risk of 20% or higher might benefit from supplemental screening tests.     Review of Systems   Constitutional: Negative.    HENT: Negative.    Eyes: Negative.    Respiratory: Negative.    Cardiovascular: Negative.    Gastrointestinal: Negative.    Endocrine: Negative.    Genitourinary: Negative.    Musculoskeletal: Negative.    Integumentary:  Negative.   Allergic/Immunologic: Negative.    Neurological: Negative.    Hematological: Negative.  Negative for adenopathy.   Psychiatric/Behavioral: Negative.    Breast: denies breast mass, pain or discharge        PHYSICAL EXAM:     Chaperone present for exam Miesha Bonilla MA    General: The patient appears well and is in no acute distress.   BREAST EXAM  No Asymmetry  Right:  - Mass: No  - Skin change: No  - Nipple Discharge: No  - Nipple retraction: No  - Axillary LAD: No  Left:   - Mass: No  - Skin change: No  - Nipple Discharge: No  - Nipple retraction: No  - Axillary LAD: No    ASSESSMENT:   This is a 43 y.o. female at high risk of breast cancer     PLAN:   Niesha Abarca has a normal breast exam today.   Continue self breast awareness. Reviewed what to look for on exam. We discussed the possible signs and symptoms of breast cancer as lump, masses, new asymmetries, skin changes and nipple changes. She is encouraged to contact me if any new breast concerns arise.  Her supplemental imaging plan is MRI will be planned in April 2024- now- if out of pocket cost too much will forgo the MRI and rtc  in Oct for mammo and exam  Mammogram in October  Return to clinic in October for andrew screen mammo and exam. Questions were encouraged and answered to patient's satisfaction. Niesha will call our office with any questions or concerns.     I spent a total of 25 minutes on this visit. This includes face to face time and non-face to face time preparing to see the patient (eg, review of tests), obtaining and/or reviewing separately obtained history, documenting clinical information in the electronic or other health record, independently interpreting results and communicating results to the patient/family/caregiver, or care coordinator.      Yusra Dalton

## 2024-04-02 ENCOUNTER — OFFICE VISIT (OUTPATIENT)
Dept: SURGERY | Facility: CLINIC | Age: 44
End: 2024-04-02
Payer: COMMERCIAL

## 2024-04-02 VITALS — BODY MASS INDEX: 39.41 KG/M2 | WEIGHT: 236.56 LBS | HEIGHT: 65 IN

## 2024-04-02 DIAGNOSIS — Z91.89 AT HIGH RISK FOR BREAST CANCER: Primary | ICD-10-CM

## 2024-04-02 DIAGNOSIS — Z12.39 BREAST CANCER SCREENING, HIGH RISK PATIENT: ICD-10-CM

## 2024-04-02 DIAGNOSIS — Z12.39 ENCOUNTER FOR SCREENING BREAST EXAMINATION: ICD-10-CM

## 2024-04-02 DIAGNOSIS — Z71.89 COUNSELING AND COORDINATION OF CARE: ICD-10-CM

## 2024-04-02 DIAGNOSIS — Z71.89 COUNSELING ON HEALTH PROMOTION AND DISEASE PREVENTION: ICD-10-CM

## 2024-04-02 PROCEDURE — 3008F BODY MASS INDEX DOCD: CPT | Mod: CPTII,S$GLB,, | Performed by: NURSE PRACTITIONER

## 2024-04-02 PROCEDURE — 99203 OFFICE O/P NEW LOW 30 MIN: CPT | Mod: S$GLB,,, | Performed by: NURSE PRACTITIONER

## 2024-04-02 PROCEDURE — 1159F MED LIST DOCD IN RCRD: CPT | Mod: CPTII,S$GLB,, | Performed by: NURSE PRACTITIONER

## 2024-04-02 PROCEDURE — 99999 PR PBB SHADOW E&M-EST. PATIENT-LVL III: CPT | Mod: PBBFAC,,, | Performed by: NURSE PRACTITIONER

## 2024-04-02 PROCEDURE — 4010F ACE/ARB THERAPY RXD/TAKEN: CPT | Mod: CPTII,S$GLB,, | Performed by: NURSE PRACTITIONER

## 2024-04-11 DIAGNOSIS — I10 ESSENTIAL HYPERTENSION: Primary | ICD-10-CM

## 2024-04-11 DIAGNOSIS — Z76.89 ENCOUNTER TO ESTABLISH CARE: ICD-10-CM

## 2024-04-15 ENCOUNTER — PATIENT MESSAGE (OUTPATIENT)
Dept: SURGERY | Facility: CLINIC | Age: 44
End: 2024-04-15
Payer: COMMERCIAL

## 2024-04-15 ENCOUNTER — HOSPITAL ENCOUNTER (OUTPATIENT)
Dept: CARDIOLOGY | Facility: HOSPITAL | Age: 44
Discharge: HOME | End: 2024-04-15
Attending: INTERNAL MEDICINE
Payer: COMMERCIAL

## 2024-04-15 ENCOUNTER — OFFICE VISIT (OUTPATIENT)
Dept: CARDIOLOGY | Facility: CLINIC | Age: 44
End: 2024-04-15
Payer: COMMERCIAL

## 2024-04-15 VITALS
WEIGHT: 233.69 LBS | DIASTOLIC BLOOD PRESSURE: 84 MMHG | BODY MASS INDEX: 38.93 KG/M2 | OXYGEN SATURATION: 98 % | HEART RATE: 88 BPM | HEIGHT: 65 IN | SYSTOLIC BLOOD PRESSURE: 132 MMHG

## 2024-04-15 DIAGNOSIS — I10 ESSENTIAL HYPERTENSION: ICD-10-CM

## 2024-04-15 DIAGNOSIS — G47.33 OSA (OBSTRUCTIVE SLEEP APNEA): ICD-10-CM

## 2024-04-15 DIAGNOSIS — Z82.49 FAMILY HISTORY OF EARLY CAD: ICD-10-CM

## 2024-04-15 DIAGNOSIS — K21.9 GASTROESOPHAGEAL REFLUX DISEASE, UNSPECIFIED WHETHER ESOPHAGITIS PRESENT: ICD-10-CM

## 2024-04-15 DIAGNOSIS — E78.2 MIXED HYPERLIPIDEMIA: ICD-10-CM

## 2024-04-15 DIAGNOSIS — Z86.16 HISTORY OF COVID-19: ICD-10-CM

## 2024-04-15 DIAGNOSIS — E66.9 OBESITY (BMI 30-39.9): ICD-10-CM

## 2024-04-15 DIAGNOSIS — K58.8 OTHER IRRITABLE BOWEL SYNDROME: ICD-10-CM

## 2024-04-15 DIAGNOSIS — Z78.9 STATIN INTOLERANCE: ICD-10-CM

## 2024-04-15 DIAGNOSIS — R07.9 CHEST PAIN, UNSPECIFIED TYPE: Primary | ICD-10-CM

## 2024-04-15 DIAGNOSIS — Z76.89 ENCOUNTER TO ESTABLISH CARE: ICD-10-CM

## 2024-04-15 DIAGNOSIS — I20.9 ANGINA PECTORIS, UNSPECIFIED: ICD-10-CM

## 2024-04-15 PROCEDURE — 4010F ACE/ARB THERAPY RXD/TAKEN: CPT | Mod: CPTII,S$GLB,, | Performed by: INTERNAL MEDICINE

## 2024-04-15 PROCEDURE — 1159F MED LIST DOCD IN RCRD: CPT | Mod: CPTII,S$GLB,, | Performed by: INTERNAL MEDICINE

## 2024-04-15 PROCEDURE — 3008F BODY MASS INDEX DOCD: CPT | Mod: CPTII,S$GLB,, | Performed by: INTERNAL MEDICINE

## 2024-04-15 PROCEDURE — 99214 OFFICE O/P EST MOD 30 MIN: CPT | Mod: S$GLB,,, | Performed by: INTERNAL MEDICINE

## 2024-04-15 PROCEDURE — 99999 PR PBB SHADOW E&M-EST. PATIENT-LVL IV: CPT | Mod: PBBFAC,,, | Performed by: INTERNAL MEDICINE

## 2024-04-15 PROCEDURE — 3079F DIAST BP 80-89 MM HG: CPT | Mod: CPTII,S$GLB,, | Performed by: INTERNAL MEDICINE

## 2024-04-15 PROCEDURE — 3075F SYST BP GE 130 - 139MM HG: CPT | Mod: CPTII,S$GLB,, | Performed by: INTERNAL MEDICINE

## 2024-04-15 PROCEDURE — 1160F RVW MEDS BY RX/DR IN RCRD: CPT | Mod: CPTII,S$GLB,, | Performed by: INTERNAL MEDICINE

## 2024-04-15 RX ORDER — NORETHINDRONE 0.35 MG/1
1 TABLET ORAL
COMMUNITY
Start: 2024-01-05

## 2024-04-15 RX ORDER — TIRZEPATIDE 5 MG/.5ML
5 INJECTION, SOLUTION SUBCUTANEOUS
Qty: 4 PEN | Refills: 1 | Status: SHIPPED | OUTPATIENT
Start: 2024-04-15

## 2024-04-15 RX ORDER — PANTOPRAZOLE SODIUM 40 MG/1
40 TABLET, DELAYED RELEASE ORAL DAILY
Qty: 90 TABLET | Refills: 1 | Status: SHIPPED | OUTPATIENT
Start: 2024-04-15 | End: 2025-04-15

## 2024-04-15 NOTE — PROGRESS NOTES
Subjective:   Patient ID:  Niesha Abarca is a 43 y.o. female who presents for cardiac consult of No chief complaint on file.      The patient came in today for cardiac consult of No chief complaint on file.      Referring Physician: Kuldip Bentley MD   Reason for initial consult: CVD Risk, HLD    Niesha Abarca is a 43 y.o. female pt with HTN, HLD, Pre-ecclamsia, insulin resistance,  obesity, FH CAD, prior smoker presents for follow up CV evaluation.    6/21/23  She has upcoming breast reduction with Dr. Carias on July 17th. Pt had elevated lipids in Oct  '22 while of Repatha. She had D&C recently. She is teaching summer school.   BP and HR stable. BMI 41 - 249 lbs. Will restart Repatha.   ECG - NSR, poor RWP - stable     4/15/24  BP and RH stable. BMI 38 - 233 lbs   Doing well will Zepbound.   ECG - NSR, LVH, poor RWP    Patient has fairly good exercise tolerance. Is a teacher, 5th grade.     Patient is compliant with medications.    FH - early CAD - maternal     Sleep study  PHYSICIAN INTERPRETATION AND COMMENTS: Findings are consistent with mild, positional obstructive sleep apnea(CARMEN).  Overall AHI was 8.0/hr. SpO2 kishan was 85.4%. Therapy should be considered.  CLINICAL HISTORY: 40 year old female presented with: 16 inch neck, BMI of 40.1, an [place] sleepiness score of 3, history  of hypertension, diabetes and symptoms of nocturnal snoring. Based on the clinical history, the patient has a high pre-test  probability of having Moderate CARMEN. STOPBANG score 5. [place] score 5.      EKG Conclusions:    1. The EKG portion of this study is negative for ischemia at a moderate workload, and peak heart rate of 164 bpm (94% of predicted).   2. Exercise capacity is average.   3. Blood pressure response to exercise was hypertensive (Presenting BP: 147/82 Peak BP: 234/60).   4. No significant arrhythmias were present.   5. There were no symptoms of chest discomfort or significant dyspnea throughout the  protocol.   6. The Duke treadmill score was 7 suggesting a low probability for future cardiovascular events.    This document has been electronically    SIGNED BY: Bayron Rankin MD On: 2019 06:43    CONCLUSIONS     1 - No wall motion abnormalities.     2 - Normal left ventricular systolic function (EF 55-60%).     3 - Normal left ventricular diastolic function.     4 - Normal right ventricular systolic function .     5 - The estimated PA systolic pressure is 23 mmHg.       This document has been electronically    SIGNED BY: Hola Jerry MD On: 2019 15:16    Past Medical History:   Diagnosis Date    Anxiety     Essential hypertension 2019    Hyperlipidemia     IBS (irritable bowel syndrome)        Past Surgical History:   Procedure Laterality Date    APPENDECTOMY       SECTION      COLONOSCOPY N/A 2022    Procedure: COLONOSCOPY;  Surgeon: Tosin Yoo MD;  Location: [facility];  Service: Endoscopy;  Laterality: N/A;    REDUCTION OF BOTH BREASTS Bilateral 2023    TOTAL REDUCTION MAMMOPLASTY Bilateral     2023    WISDOM TOOTH EXTRACTION         Social History     Tobacco Use    Smoking status: Former     Current packs/day: 0.00     Average packs/day: 0.5 packs/day for 15.0 years (7.5 ttl pk-yrs)     Types: Cigarettes     Start date: 2003     Quit date: 2018     Years since quittin.9     Passive exposure: Never    Smokeless tobacco: Never   Substance Use Topics    Alcohol use: Not Currently    Drug use: No       Family History   Adopted: Yes   Problem Relation Name Age of Onset    No Known Problems Mother      No Known Problems Father      Ovarian cancer Paternal Grandmother      Breast cancer Paternal Grandmother         Patient's Medications   New Prescriptions    No medications on file   Previous Medications    AMLODIPINE (NORVASC) 5 MG TABLET    Take 1 tablet (5 mg total) by mouth 2 (two) times a day.    BUPROPION (WELLBUTRIN XL) 150 MG TB24 TABLET     Take 1 tablet (150 mg total) by mouth once daily.    BUSPIRONE (BUSPAR) 10 MG TABLET    Take 1 tablet (10 mg total) by mouth 2 (two) times daily.    CLOBETASOL 0.05% (TEMOVATE) 0.05 % OINT    Apply topically 2 (two) times daily. Use up to 2 weeks at a time    IBUPROFEN (ADVIL,MOTRIN) 600 MG TABLET        LOSARTAN (COZAAR) 50 MG TABLET    Take 1 tablet (50 mg total) by mouth every evening.    NORETHINDRONE (MICRONOR) 0.35 MG TABLET    Take 1 tablet by mouth.    PANTOPRAZOLE (PROTONIX) 40 MG TABLET    Take 1 tablet (40 mg total) by mouth once daily.    TIRZEPATIDE, WEIGHT LOSS, (ZEPBOUND) 2.5 MG/0.5 ML PNIJ    Inject 2.5 mg into the skin every 7 days.    TRETINOIN MICROSPHERES (RETIN-A MICRO PUMP) 0.06 % GLWP    Apply 1 application topically every evening.    VALACYCLOVIR (VALTREX) 1000 MG TABLET    Take 2 tablets (2,000 mg total) by mouth every 12 (twelve) hours.   Modified Medications    No medications on file   Discontinued Medications    No medications on file       Review of Systems   Constitutional:  Positive for malaise/fatigue.   HENT: Negative.     Eyes: Negative.    Respiratory: Negative.     Cardiovascular:  Positive for chest pain and leg swelling (rare).   Gastrointestinal: Negative.    Genitourinary: Negative.    Musculoskeletal: Negative.    Skin: Negative.    Neurological: Negative.    Endo/Heme/Allergies: Negative.    Psychiatric/Behavioral: Negative.     All 12 systems otherwise negative.      Wt Readings from Last 3 Encounters:   04/15/24 106 kg (233 lb 11 oz)   04/02/24 107.3 kg (236 lb 8.9 oz)   08/24/23 109.8 kg (242 lb)     Temp Readings from Last 3 Encounters:   08/24/23 (!) 102.1 °F (38.9 °C) (Oral)   06/16/23 97.4 °F (36.3 °C) (Tympanic)   04/18/23 98.1 °F (36.7 °C)     BP Readings from Last 3 Encounters:   04/15/24 132/84   08/24/23 137/80   06/21/23 133/84     Pulse Readings from Last 3 Encounters:   04/15/24 88   08/24/23 109   06/21/23 99       /84 (BP Location: Right arm, Patient  "Position: Sitting, BP Method: Large (Manual))   Pulse 88   Ht 5' 5" (1.651 m)   Wt 106 kg (233 lb 11 oz)   LMP 03/12/2024   SpO2 98%   BMI 38.89 kg/m²     Objective:   Physical Exam  Vitals and nursing note reviewed.   Constitutional:       General: She is not in acute distress.     Appearance: She is well-developed. She is obese. She is not diaphoretic.      Comments: Obese   HENT:      Head: Normocephalic and atraumatic.      Nose: Nose normal.   Eyes:      General: No scleral icterus.     Conjunctiva/sclera: Conjunctivae normal.   Neck:      Thyroid: No thyromegaly.      Vascular: No JVD.   Cardiovascular:      Rate and Rhythm: Normal rate and regular rhythm.      Heart sounds: S1 normal and S2 normal. No murmur heard.     No friction rub. No gallop. No S3 or S4 sounds.   Pulmonary:      Effort: Pulmonary effort is normal. No respiratory distress.      Breath sounds: Normal breath sounds. No stridor. No wheezing or rales.   Chest:      Chest wall: No tenderness.   Abdominal:      General: Bowel sounds are normal. There is no distension.      Palpations: Abdomen is soft. There is no mass.      Tenderness: There is no abdominal tenderness. There is no rebound.   Genitourinary:     Comments: Deferred  Musculoskeletal:         General: No tenderness or deformity. Normal range of motion.      Cervical back: Normal range of motion and neck supple.   Lymphadenopathy:      Cervical: No cervical adenopathy.   Skin:     General: Skin is warm and dry.      Coloration: Skin is not pale.      Findings: No erythema or rash.   Neurological:      Mental Status: She is alert and oriented to person, place, and time.      Motor: No abnormal muscle tone.      Coordination: Coordination normal.   Psychiatric:         Behavior: Behavior normal.         Thought Content: Thought content normal.         Judgment: Judgment normal.         Lab Results   Component Value Date     (L) 06/16/2023    K 4.1 06/16/2023     " 06/16/2023    CO2 22 (L) 06/16/2023    BUN 13 06/16/2023    CREATININE 0.7 06/16/2023    GLU 80 06/16/2023    HGBA1C 5.4 10/04/2022    AST 14 06/16/2023    ALT 17 06/16/2023    ALBUMIN 3.7 06/16/2023    PROT 7.5 06/16/2023    BILITOT 0.3 06/16/2023    WBC 9.24 06/16/2023    HGB 12.1 06/16/2023    HCT 38.5 06/16/2023    MCV 85 06/16/2023     06/16/2023    TSH 1.568 10/04/2022    CHOL 277 (H) 10/04/2022    HDL 51 10/04/2022    LDLCALC 209.0 (H) 10/04/2022    TRIG 85 10/04/2022     Assessment:      1. Chest pain, unspecified type    2. Angina pectoris, unspecified    3. Essential hypertension    4. Mixed hyperlipidemia    5. CARMEN (obstructive sleep apnea)    6. Gastroesophageal reflux disease, unspecified whether esophagitis present    7. History of COVID-19    8. Statin intolerance    9. Family history of early CAD    10. Other irritable bowel syndrome    11. Obesity (BMI 30-39.9)            Plan:   1. HTN  - cont meds  - changed benicar to losartan - increased to 50mg QHS, norvasc BID  - low salt diet   - weight loss  - not on birth control    2. 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     3. Insulin resistance - no longer based on recent A1c  - cont meds per PCP      4. Obesity BMI 40 - 244 lbs.  --> BMI 41 - 249 lbs  BMI 38 - 233 lbs   - need weight loss with diet/exercise    - refer to bariatric diet   - is on Zepbound will increase dose, will start Wegovy if covered     5. Chest pain - atypical , occ  - stress test - negative 2019  - prior 2D ECHO - normal  - rare snoring/allergies     6. IBS  - cont tx per PCP  -neg Cscope     7. CARMEN, mild  - cont tx per pulm  - has been using Mouth guard    8. GERD - improved  - cont PPI, f/u GI  - has atypical CP  - may need EGD - low CV risk - pending    9. H/O COVID  19  - stable    10. Preop CV eval - breast reduction with Dr. Carias on July 17th.  - low CV risk, proceed without further CV work up      Thank you for allowing me to participate in this patient's care. Please do not hesitate to contact me with any questions or concerns. Consult note has been forwarded to the referral physician.

## 2024-04-25 ENCOUNTER — HOSPITAL ENCOUNTER (OUTPATIENT)
Dept: RADIOLOGY | Facility: HOSPITAL | Age: 44
Discharge: HOME | End: 2024-04-25
Attending: NURSE PRACTITIONER
Payer: COMMERCIAL

## 2024-04-25 DIAGNOSIS — Z12.39 BREAST CANCER SCREENING, HIGH RISK PATIENT: ICD-10-CM

## 2024-04-25 DIAGNOSIS — Z91.89 AT HIGH RISK FOR BREAST CANCER: ICD-10-CM

## 2024-04-25 PROCEDURE — 77049 MRI BREAST C-+ W/CAD BI: CPT | Mod: TC

## 2024-04-25 PROCEDURE — A9577 INJ MULTIHANCE: HCPCS | Performed by: NURSE PRACTITIONER

## 2024-04-25 PROCEDURE — 77049 MRI BREAST C-+ W/CAD BI: CPT | Mod: 26,,, | Performed by: RADIOLOGY

## 2024-04-25 PROCEDURE — 25500020 PHARM REV CODE 255: Performed by: NURSE PRACTITIONER

## 2024-04-25 RX ADMIN — GADOBENATE DIMEGLUMINE 15 ML: 529 INJECTION, SOLUTION INTRAVENOUS at 02:04

## 2024-04-28 ENCOUNTER — PATIENT MESSAGE (OUTPATIENT)
Dept: CARDIOLOGY | Facility: CLINIC | Age: 44
End: 2024-04-28
Payer: COMMERCIAL

## 2024-04-29 DIAGNOSIS — Z91.89 AT INCREASED RISK FOR CARDIOVASCULAR DISEASE: ICD-10-CM

## 2024-04-29 DIAGNOSIS — E66.01 SEVERE OBESITY (BMI 35.0-35.9 WITH COMORBIDITY): ICD-10-CM

## 2024-04-29 RX ORDER — TIRZEPATIDE 2.5 MG/.5ML
2.5 INJECTION, SOLUTION SUBCUTANEOUS
Qty: 4 PEN | Refills: 3 | Status: SHIPPED | OUTPATIENT
Start: 2024-04-29

## 2024-07-10 DIAGNOSIS — I10 ESSENTIAL HYPERTENSION: ICD-10-CM

## 2024-07-10 DIAGNOSIS — R07.9 CHEST PAIN, UNSPECIFIED TYPE: Primary | ICD-10-CM

## 2024-07-19 ENCOUNTER — HOSPITAL ENCOUNTER (OUTPATIENT)
Dept: CARDIOLOGY | Facility: HOSPITAL | Age: 44
Discharge: HOME | End: 2024-07-19
Attending: INTERNAL MEDICINE
Payer: COMMERCIAL

## 2024-07-19 ENCOUNTER — OFFICE VISIT (OUTPATIENT)
Dept: CARDIOLOGY | Facility: CLINIC | Age: 44
End: 2024-07-19
Payer: COMMERCIAL

## 2024-07-19 VITALS
WEIGHT: 221.31 LBS | DIASTOLIC BLOOD PRESSURE: 80 MMHG | SYSTOLIC BLOOD PRESSURE: 136 MMHG | WEIGHT: 221.31 LBS | OXYGEN SATURATION: 99 % | BODY MASS INDEX: 36.83 KG/M2 | BODY MASS INDEX: 36.87 KG/M2 | HEIGHT: 65 IN | HEART RATE: 91 BPM

## 2024-07-19 DIAGNOSIS — G47.33 OSA (OBSTRUCTIVE SLEEP APNEA): ICD-10-CM

## 2024-07-19 DIAGNOSIS — R07.9 CHEST PAIN, UNSPECIFIED TYPE: ICD-10-CM

## 2024-07-19 DIAGNOSIS — Z86.16 HISTORY OF COVID-19: ICD-10-CM

## 2024-07-19 DIAGNOSIS — Z78.9 STATIN INTOLERANCE: ICD-10-CM

## 2024-07-19 DIAGNOSIS — K21.9 GASTROESOPHAGEAL REFLUX DISEASE, UNSPECIFIED WHETHER ESOPHAGITIS PRESENT: ICD-10-CM

## 2024-07-19 DIAGNOSIS — Z13.6 ENCOUNTER FOR SCREENING FOR CARDIOVASCULAR DISORDERS: ICD-10-CM

## 2024-07-19 DIAGNOSIS — E78.2 MIXED HYPERLIPIDEMIA: ICD-10-CM

## 2024-07-19 DIAGNOSIS — I20.9 ANGINA PECTORIS, UNSPECIFIED: ICD-10-CM

## 2024-07-19 DIAGNOSIS — E66.01 MORBID OBESITY WITH BMI OF 40.0-44.9, ADULT: ICD-10-CM

## 2024-07-19 DIAGNOSIS — E78.01 FAMILIAL HYPERCHOLESTEROLEMIA: ICD-10-CM

## 2024-07-19 DIAGNOSIS — E66.01 SEVERE OBESITY (BMI 35.0-35.9 WITH COMORBIDITY): ICD-10-CM

## 2024-07-19 DIAGNOSIS — I10 ESSENTIAL HYPERTENSION: ICD-10-CM

## 2024-07-19 DIAGNOSIS — I10 ESSENTIAL HYPERTENSION: Primary | ICD-10-CM

## 2024-07-19 PROCEDURE — 1160F RVW MEDS BY RX/DR IN RCRD: CPT | Mod: CPTII,S$GLB,, | Performed by: INTERNAL MEDICINE

## 2024-07-19 PROCEDURE — 99214 OFFICE O/P EST MOD 30 MIN: CPT | Mod: S$GLB,,, | Performed by: INTERNAL MEDICINE

## 2024-07-19 PROCEDURE — 99999 PR PBB SHADOW E&M-EST. PATIENT-LVL IV: CPT | Mod: PBBFAC,,, | Performed by: INTERNAL MEDICINE

## 2024-07-19 PROCEDURE — 3008F BODY MASS INDEX DOCD: CPT | Mod: CPTII,S$GLB,, | Performed by: INTERNAL MEDICINE

## 2024-07-19 PROCEDURE — 3079F DIAST BP 80-89 MM HG: CPT | Mod: CPTII,S$GLB,, | Performed by: INTERNAL MEDICINE

## 2024-07-19 PROCEDURE — 1159F MED LIST DOCD IN RCRD: CPT | Mod: CPTII,S$GLB,, | Performed by: INTERNAL MEDICINE

## 2024-07-19 PROCEDURE — 4010F ACE/ARB THERAPY RXD/TAKEN: CPT | Mod: CPTII,S$GLB,, | Performed by: INTERNAL MEDICINE

## 2024-07-19 PROCEDURE — 93005 ELECTROCARDIOGRAM TRACING: CPT

## 2024-07-19 PROCEDURE — 93010 ELECTROCARDIOGRAM REPORT: CPT | Mod: ,,, | Performed by: STUDENT IN AN ORGANIZED HEALTH CARE EDUCATION/TRAINING PROGRAM

## 2024-07-19 PROCEDURE — 3075F SYST BP GE 130 - 139MM HG: CPT | Mod: CPTII,S$GLB,, | Performed by: INTERNAL MEDICINE

## 2024-07-19 PROCEDURE — 3044F HG A1C LEVEL LT 7.0%: CPT | Mod: CPTII,S$GLB,, | Performed by: INTERNAL MEDICINE

## 2024-07-19 NOTE — PROGRESS NOTES
Subjective:   Patient ID:  Niesha Abarca is a 43 y.o. female who presents for cardiac consult of No chief complaint on file.      The patient came in today for cardiac consult of No chief complaint on file.      Referring Physician: Kuldip Bentley MD   Reason for initial consult: CVD Risk, HLD    Niesah Abarca is a 43 y.o. female pt with HTN, HLD, Pre-ecclamsia, insulin resistance,  obesity, FH CAD, prior smoker presents for follow up CV evaluation.    6/21/23  She has upcoming breast reduction with Dr. Carias on July 17th. Pt had elevated lipids in Oct  '22 while of Repatha. She had D&C recently. She is teaching summer school.   BP and HR stable. BMI 41 - 249 lbs. Will restart Repatha.   ECG - NSR, poor RWP - stable     4/15/24  BP and HR stable. BMI 38 - 233 lbs   Doing well will Zepbound.   ECG - NSR, LVH, poor RWP    7/19/24  BP and HR stable. BMI 36 -- 221 lbs   She is losing weight now with compounding pharmacy [facility]ep[facility].     Patient has fairly good exercise tolerance. Is a teacher, 5th grade.     Patient is compliant with medications.    FH - early CAD - maternal GM    Sleep study  PHYSICIAN INTERPRETATION AND COMMENTS: Findings are consistent with mild, positional obstructive sleep apnea(CARMEN).  Overall AHI was 8.0/hr. SpO2 kishan was 85.4%. Therapy should be considered.  CLINICAL HISTORY: 40 year old female presented with: 16 inch neck, BMI of 40.1, an [place] sleepiness score of 3, history  of hypertension, diabetes and symptoms of nocturnal snoring. Based on the clinical history, the patient has a high pre-test  probability of having Moderate CARMEN. STOPBANG score 5. [place] score 5.      EKG Conclusions:    1. The EKG portion of this study is negative for ischemia at a moderate workload, and peak heart rate of 164 bpm (94% of predicted).   2. Exercise capacity is average.   3. Blood pressure response to exercise was hypertensive (Presenting BP: 147/82 Peak BP: 234/60).   4. No significant  arrhythmias were present.   5. There were no symptoms of chest discomfort or significant dyspnea throughout the protocol.   6. The Duke treadmill score was 7 suggesting a low probability for future cardiovascular events.    This document has been electronically    SIGNED BY: Bayron Rankin MD On: 2019 06:43    CONCLUSIONS     1 - No wall motion abnormalities.     2 - Normal left ventricular systolic function (EF 55-60%).     3 - Normal left ventricular diastolic function.     4 - Normal right ventricular systolic function .     5 - The estimated PA systolic pressure is 23 mmHg.       This document has been electronically    SIGNED BY: Hola Jerry MD On: 2019 15:16    Past Medical History:   Diagnosis Date    Anxiety     Essential hypertension 2019    Hyperlipidemia     IBS (irritable bowel syndrome)        Past Surgical History:   Procedure Laterality Date    APPENDECTOMY       SECTION      COLONOSCOPY N/A 2022    Procedure: COLONOSCOPY;  Surgeon: Tosin Yoo MD;  Location: [facility];  Service: Endoscopy;  Laterality: N/A;    REDUCTION OF BOTH BREASTS Bilateral 2023    TOTAL REDUCTION MAMMOPLASTY Bilateral     2023    WISDOM TOOTH EXTRACTION         Social History     Tobacco Use    Smoking status: Former     Current packs/day: 0.00     Average packs/day: 0.5 packs/day for 15.0 years (7.5 ttl pk-yrs)     Types: Cigarettes     Start date: 2003     Quit date: 2018     Years since quittin.2     Passive exposure: Never    Smokeless tobacco: Never   Substance Use Topics    Alcohol use: Not Currently    Drug use: No       Family History   Adopted: Yes   Problem Relation Name Age of Onset    No Known Problems Mother      No Known Problems Father      Ovarian cancer Paternal Grandmother      Breast cancer Paternal Grandmother         Patient's Medications   New Prescriptions    No medications on file   Previous Medications    AMLODIPINE (NORVASC) 5 MG TABLET     Take 1 tablet (5 mg total) by mouth 2 (two) times a day.    BUPROPION (WELLBUTRIN XL) 150 MG TB24 TABLET    Take 1 tablet (150 mg total) by mouth once daily.    BUSPIRONE (BUSPAR) 10 MG TABLET    Take 1 tablet (10 mg total) by mouth 2 (two) times daily.    CLOBETASOL 0.05% (TEMOVATE) 0.05 % OINT    Apply topically 2 (two) times daily. Use up to 2 weeks at a time    IBUPROFEN (ADVIL,MOTRIN) 600 MG TABLET        LOSARTAN (COZAAR) 50 MG TABLET    Take 1 tablet (50 mg total) by mouth every evening.    NORETHINDRONE (MICRONOR) 0.35 MG TABLET    Take 1 tablet by mouth.    PANTOPRAZOLE (PROTONIX) 40 MG TABLET    Take 1 tablet (40 mg total) by mouth once daily.    TIRZEPATIDE, WEIGHT LOSS, (ZEPBOUND) 2.5 MG/0.5 ML PNIJ    Inject 2.5 mg into the skin every 7 days.    TIRZEPATIDE, WEIGHT LOSS, (ZEPBOUND) 5 MG/0.5 ML PNIJ    Inject 5 mg into the skin every 7 days.    TRETINOIN MICROSPHERES (RETIN-A MICRO PUMP) 0.06 % GLWP    Apply 1 application topically every evening.    VALACYCLOVIR (VALTREX) 1000 MG TABLET    Take 2 tablets (2,000 mg total) by mouth every 12 (twelve) hours.   Modified Medications    No medications on file   Discontinued Medications    No medications on file       Review of Systems   Constitutional:  Positive for malaise/fatigue.   HENT: Negative.     Eyes: Negative.    Respiratory: Negative.     Cardiovascular:  Positive for chest pain and leg swelling (rare).   Gastrointestinal: Negative.    Genitourinary: Negative.    Musculoskeletal: Negative.    Skin: Negative.    Neurological: Negative.    Endo/Heme/Allergies: Negative.    Psychiatric/Behavioral: Negative.     All 12 systems otherwise negative.      Wt Readings from Last 3 Encounters:   07/19/24 100.4 kg (221 lb 5.5 oz)   07/19/24 100.4 kg (221 lb 5.5 oz)   04/15/24 106 kg (233 lb 11 oz)     Temp Readings from Last 3 Encounters:   08/24/23 (!) 102.1 °F (38.9 °C) (Oral)   06/16/23 97.4 °F (36.3 °C) (Tympanic)   04/18/23 98.1 °F (36.7 °C)     BP  "Readings from Last 3 Encounters:   07/19/24 136/80   04/15/24 132/84   08/24/23 137/80     Pulse Readings from Last 3 Encounters:   07/19/24 91   04/15/24 88   08/24/23 109       /80 (BP Location: Right arm, Patient Position: Sitting, BP Method: Medium (Manual))   Pulse 91   Ht 5' 5" (1.651 m)   Wt 100.4 kg (221 lb 5.5 oz)   SpO2 99%   BMI 36.83 kg/m²     Objective:   Physical Exam  Vitals and nursing note reviewed.   Constitutional:       General: She is not in acute distress.     Appearance: She is well-developed. She is obese. She is not diaphoretic.      Comments: Obese   HENT:      Head: Normocephalic and atraumatic.      Nose: Nose normal.   Eyes:      General: No scleral icterus.     Conjunctiva/sclera: Conjunctivae normal.   Neck:      Thyroid: No thyromegaly.      Vascular: No JVD.   Cardiovascular:      Rate and Rhythm: Normal rate and regular rhythm.      Heart sounds: S1 normal and S2 normal. No murmur heard.     No friction rub. No gallop. No S3 or S4 sounds.   Pulmonary:      Effort: Pulmonary effort is normal. No respiratory distress.      Breath sounds: Normal breath sounds. No stridor. No wheezing or rales.   Chest:      Chest wall: No tenderness.   Abdominal:      General: Bowel sounds are normal. There is no distension.      Palpations: Abdomen is soft. There is no mass.      Tenderness: There is no abdominal tenderness. There is no rebound.   Genitourinary:     Comments: Deferred  Musculoskeletal:         General: No tenderness or deformity. Normal range of motion.      Cervical back: Normal range of motion and neck supple.   Lymphadenopathy:      Cervical: No cervical adenopathy.   Skin:     General: Skin is warm and dry.      Coloration: Skin is not pale.      Findings: No erythema or rash.   Neurological:      Mental Status: She is alert and oriented to person, place, and time.      Motor: No abnormal muscle tone.      Coordination: Coordination normal.   Psychiatric:         " Behavior: Behavior normal.         Thought Content: Thought content normal.         Judgment: Judgment normal.         Lab Results   Component Value Date     (L) 06/16/2023    K 4.1 06/16/2023     06/16/2023    CO2 22 (L) 06/16/2023    BUN 13 06/16/2023    CREATININE 1.0 04/25/2024    GLU 80 06/16/2023    HGBA1C 4.8 07/11/2024    HGBA1C 5.4 10/04/2022    AST 14 06/16/2023    ALT 17 06/16/2023    ALBUMIN 3.7 06/16/2023    PROT 7.5 06/16/2023    BILITOT 0.3 06/16/2023    WBC 9.24 06/16/2023    HGB 12.1 06/16/2023    HCT 38.5 06/16/2023    MCV 85 06/16/2023     06/16/2023    TSH 1.28 07/11/2024    TSH 1.568 10/04/2022    CHOL 277 (H) 10/04/2022    HDL 51 10/04/2022    LDLCALC 209.0 (H) 10/04/2022    TRIG 85 10/04/2022     Assessment:      1. Essential hypertension    2. Morbid obesity with BMI of 40.0-44.9, adult    3. Severe obesity (BMI 35.0-35.9 with comorbidity)    4. Angina pectoris, unspecified    5. Mixed hyperlipidemia    6. CARMEN (obstructive sleep apnea)    7. History of COVID-19    8. Gastroesophageal reflux disease, unspecified whether esophagitis present            Plan:   1. HTN  - cont meds  - changed benicar to losartan - increased to 50mg QHS, norvasc BID  - low salt diet   - weight loss  - not on birth control    2. 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   - start Nexlizet - repeat labs in 3 months     3. Insulin resistance - no longer based on recent A1c  - cont meds per PCP      4. Obesity BMI 40 - 244 lbs.  --> BMI 41 - 249 lbs  BMI 38 - 233 lbs  -->  BMI 36 -- 221 lbs   - need weight loss with diet/exercise    - refer to bariatric diet   - is on Zepbound will increase dose, - in on Tirzepatide now compounding     5. Chest pain - atypical , occ  - stress test -  negative 2019  - prior 2D ECHO - normal  - rare snoring/allergies     6. IBS  - cont tx per PCP  -neg Cscope     7. CARMEN, mild  - cont tx per pulm  - has been using Mouth guard    8. GERD - improved  - cont PPI, f/u GI  - has atypical CP  - may need EGD - low CV risk - pending    9. H/O COVID 19  - stable        Thank you for allowing me to participate in this patient's care. Please do not hesitate to contact me with any questions or concerns. Consult note has been forwarded to the referral physician.

## 2024-07-20 LAB
OHS QRS DURATION: 72 MS
OHS QTC CALCULATION: 424 MS

## 2024-07-24 ENCOUNTER — OFFICE VISIT (OUTPATIENT)
Dept: DERMATOLOGY | Facility: CLINIC | Age: 44
End: 2024-07-24
Payer: COMMERCIAL

## 2024-07-24 DIAGNOSIS — L81.4 SOLAR LENTIGO: ICD-10-CM

## 2024-07-24 DIAGNOSIS — L30.9 ECZEMA, UNSPECIFIED TYPE: ICD-10-CM

## 2024-07-24 DIAGNOSIS — L82.1 SEBORRHEIC KERATOSIS: ICD-10-CM

## 2024-07-24 DIAGNOSIS — D22.9 MULTIPLE BENIGN NEVI: ICD-10-CM

## 2024-07-24 DIAGNOSIS — D18.00 HEMANGIOMA, UNSPECIFIED SITE: ICD-10-CM

## 2024-07-24 DIAGNOSIS — Z12.83 SKIN CANCER SCREENING: Primary | ICD-10-CM

## 2024-07-24 PROCEDURE — 99999 PR PBB SHADOW E&M-EST. PATIENT-LVL III: CPT | Mod: PBBFAC,,, | Performed by: STUDENT IN AN ORGANIZED HEALTH CARE EDUCATION/TRAINING PROGRAM

## 2024-07-24 RX ORDER — TRETINOIN 0.6 MG/G
1 GEL TOPICAL NIGHTLY
Qty: 50 G | Refills: 5 | Status: SHIPPED | OUTPATIENT
Start: 2024-07-24

## 2024-07-24 NOTE — PROGRESS NOTES
Subjective:       Patient ID:  Niesha Abarca is a 43 y.o. female who presents for   Chief Complaint   Patient presents with    Skin Check     Full body skin exam.      HPI     Denies personal hx of melanoma or NMSC. +family hx of melanoma (biological mother). Patient here for skin check.    Review of Systems   Skin:  Positive for daily sunscreen use. Negative for itching, rash and recent sunburn.   Hematologic/Lymphatic: Does not bruise/bleed easily.        Objective:    Physical Exam   Constitutional: She appears well-developed and well-nourished. No distress.   Neurological: She is alert and oriented to person, place, and time. She is not disoriented.   Psychiatric: She has a normal mood and affect.   Skin:   Areas Examined (abnormalities noted in diagram):   Scalp / Hair Palpated and Inspected  Head / Face Inspection Performed  Neck Inspection Performed  Chest / Axilla Inspection Performed  Abdomen Inspection Performed  Genitals / Buttocks / Groin Inspection Performed  Back Inspection Performed  RUE Inspected  LUE Inspection Performed  RLE Inspected  LLE Inspection Performed                       Diagram Legend     Erythematous scaling macule/papule c/w actinic keratosis       Vascular papule c/w angioma      Pigmented verrucoid papule/plaque c/w seborrheic keratosis      Yellow umbilicated papule c/w sebaceous hyperplasia      Irregularly shaped tan macule c/w lentigo     1-2 mm smooth white papules consistent with Milia      Movable subcutaneous cyst with punctum c/w epidermal inclusion cyst      Subcutaneous movable cyst c/w pilar cyst      Firm pink to brown papule c/w dermatofibroma      Pedunculated fleshy papule(s) c/w skin tag(s)      Evenly pigmented macule c/w junctional nevus     Mildly variegated pigmented, slightly irregular-bordered macule c/w mildly atypical nevus      Flesh colored to evenly pigmented papule c/w intradermal nevus       Pink pearly papule/plaque c/w basal cell carcinoma       Erythematous hyperkeratotic cursted plaque c/w SCC      Surgical scar with no sign of skin cancer recurrence      Open and closed comedones      Inflammatory papules and pustules      Verrucoid papule consistent consistent with wart     Erythematous eczematous patches and plaques     Dystrophic onycholytic nail with subungual debris c/w onychomycosis     Umbilicated papule    Erythematous-base heme-crusted tan verrucoid plaque consistent with inflamed seborrheic keratosis     Erythematous Silvery Scaling Plaque c/w Psoriasis     See annotation      Assessment / Plan:        Skin cancer screening  Total body skin examination performed today including at least 12 points as noted in physical examination. No lesions suspicious for malignancy noted.    Recommend daily sun protection/avoidance, use of at least SPF 30, broad spectrum sunscreen (OTC drug), skin self examinations, and routine physician surveillance to optimize early detection    Multiple benign nevi  Discussed ABCDE's of nevi.  Monitor for new mole or moles that are becoming bigger, darker, irritated, or developing irregular borders. Brochure provided. Instructed patient to observe lesion(s) for changes and follow up in clinic if changes are noted. Patient to monitor skin at home for new or changing lesions.     Hemangioma, unspecified site  This is a benign vascular lesion. Reassurance given. No treatment required.     Seborrheic keratosis  These are benign inherited growths without a malignant potential. Reassurance given to patient. No treatment is necessary.     Solar lentigo  -     tretinoin microspheres (RETIN-A MICRO PUMP) 0.06 % GlwP; Apply 1 application topically every evening.  Dispense: 50 g; Refill: 5    Eczema  - Continue clobetasol ointment   Counseling on topical steroids:  Patient counseled that the prolonged use of topical steroids can result in the increased appearance superficial blood vessels (telangiectasias) lightening  (hypopigmentation), and   thinning of the skin ( atrophy).  Patient understands to avoid using high potency steroids in skin folds, the groin or the face.  The patient verbalized understanding of proper use and possible adverse effects of topical steroids.  All patient's questions and concerns were addressed.        LOS NUMBER AND COMPLEXITY OF PROBLEMS    COMPLEXITY OF DATA RISK TOTAL TIME (m)   14259  15314 [] 1 self-limited or minor problem [x] Minimal to none [] No treatment recommended or patient to monitor 15-29  10-19   16872  59157 Low  [] 2 or > self limited or minor problems  [] 1 stable chronic illness  [] 1 acute, uncomplicated illness or injury Limited (2)  [] Prior external notes from each unique source  [] Review result of each unique test  [] Order each unique test []  Low  OTC medications, minor skin biopsy 30-44  20-29   09557  52744 Moderate  []  1 or > chronic illness with progression, exacerbation or SE of treatment  [x]  2 or more stable chronic illnesses  []  1 acute illness with systemic symptoms  []  1 acute complicated injury  []  1 undiagnosed new problem with uncertain prognosis Moderate (1/3 below)  []  3 or more data items        *Now includes assessment requiring independent historian  []  Independent interpretation of a test  []  Discuss management/test with another provider Moderate  [x]  Prescription drug mgmt  []  Minor surgery with risk discussed  []  Mgmt limited by social determinates 45-59  30-39   91269  74316 High  []  1 or more chronic illness with severe exacerbation, progression or SE of treatment  []  1 acute or chronic illness/injury that poses a threat to life or bodily function Extensive (2/3 below)  []  3 or more data items        *Now includes assessment requiring independent historian.  []  Independent interpretation of a test  []  Discuss management/test with another provider High  []  Major surgery with risk discussed  []  Drug therapy requiring intensive  monitoring for toxicity  []  Hospitalization  []  Decision for DNR 60-74  40-54

## 2024-07-26 DIAGNOSIS — E78.01 FAMILIAL HYPERCHOLESTEROLEMIA: ICD-10-CM

## 2024-07-26 DIAGNOSIS — Z78.9 STATIN INTOLERANCE: ICD-10-CM

## 2024-09-25 DIAGNOSIS — Z91.89 AT HIGH RISK FOR BREAST CANCER: Primary | ICD-10-CM

## 2024-09-25 DIAGNOSIS — Z12.31 ENCOUNTER FOR SCREENING MAMMOGRAM FOR MALIGNANT NEOPLASM OF BREAST: ICD-10-CM

## 2024-10-07 ENCOUNTER — HOSPITAL ENCOUNTER (OUTPATIENT)
Dept: RADIOLOGY | Facility: HOSPITAL | Age: 44
Discharge: HOME | End: 2024-10-07
Attending: INTERNAL MEDICINE
Payer: COMMERCIAL

## 2024-10-07 DIAGNOSIS — E78.01 FAMILIAL HYPERCHOLESTEROLEMIA: ICD-10-CM

## 2024-10-07 DIAGNOSIS — Z13.6 ENCOUNTER FOR SCREENING FOR CARDIOVASCULAR DISORDERS: ICD-10-CM

## 2024-10-07 PROCEDURE — 75571 CT HRT W/O DYE W/CA TEST: CPT | Mod: 26,,, | Performed by: RADIOLOGY

## 2024-10-07 PROCEDURE — 75571 CT HRT W/O DYE W/CA TEST: CPT | Mod: TC

## 2024-11-12 ENCOUNTER — HOSPITAL ENCOUNTER (OUTPATIENT)
Dept: RADIOLOGY | Facility: HOSPITAL | Age: 44
Discharge: HOME | End: 2024-11-12
Attending: NURSE PRACTITIONER
Payer: COMMERCIAL

## 2024-11-12 DIAGNOSIS — Z91.89 AT HIGH RISK FOR BREAST CANCER: ICD-10-CM

## 2024-11-12 DIAGNOSIS — Z12.31 ENCOUNTER FOR SCREENING MAMMOGRAM FOR MALIGNANT NEOPLASM OF BREAST: ICD-10-CM

## 2024-11-12 PROCEDURE — 77063 BREAST TOMOSYNTHESIS BI: CPT | Mod: TC,PO

## 2024-11-12 PROCEDURE — 77067 SCR MAMMO BI INCL CAD: CPT | Mod: 26,,, | Performed by: RADIOLOGY

## 2024-11-12 PROCEDURE — 77063 BREAST TOMOSYNTHESIS BI: CPT | Mod: 26,,, | Performed by: RADIOLOGY

## 2025-01-01 ENCOUNTER — OFFICE VISIT (OUTPATIENT)
Dept: URGENT CARE | Facility: CLINIC | Age: 45
End: 2025-01-01
Payer: COMMERCIAL

## 2025-01-01 VITALS
HEIGHT: 65 IN | RESPIRATION RATE: 16 BRPM | HEART RATE: 103 BPM | OXYGEN SATURATION: 100 % | SYSTOLIC BLOOD PRESSURE: 140 MMHG | DIASTOLIC BLOOD PRESSURE: 79 MMHG | WEIGHT: 201.19 LBS | TEMPERATURE: 98 F | BODY MASS INDEX: 33.52 KG/M2

## 2025-01-01 DIAGNOSIS — J02.9 SORE THROAT: ICD-10-CM

## 2025-01-01 DIAGNOSIS — H92.02 LEFT EAR PAIN: ICD-10-CM

## 2025-01-01 DIAGNOSIS — H66.002 NON-RECURRENT ACUTE SUPPURATIVE OTITIS MEDIA OF LEFT EAR WITHOUT SPONTANEOUS RUPTURE OF TYMPANIC MEMBRANE: Primary | ICD-10-CM

## 2025-01-01 LAB
CTP QC/QA: YES
MOLECULAR STREP A: NEGATIVE

## 2025-01-01 RX ORDER — IPRATROPIUM BROMIDE 21 UG/1
2 SPRAY, METERED NASAL 2 TIMES DAILY
Qty: 30 ML | Refills: 0 | Status: SHIPPED | OUTPATIENT
Start: 2025-01-01

## 2025-01-01 RX ORDER — KETOROLAC TROMETHAMINE 30 MG/ML
30 INJECTION, SOLUTION INTRAMUSCULAR; INTRAVENOUS
Status: COMPLETED | OUTPATIENT
Start: 2025-01-01 | End: 2025-01-01

## 2025-01-01 RX ORDER — AMOXICILLIN 500 MG/1
500 CAPSULE ORAL EVERY 12 HOURS
Qty: 20 CAPSULE | Refills: 0 | Status: SHIPPED | OUTPATIENT
Start: 2025-01-01 | End: 2025-01-11

## 2025-01-01 RX ADMIN — KETOROLAC TROMETHAMINE 30 MG: 30 INJECTION, SOLUTION INTRAMUSCULAR; INTRAVENOUS at 07:01

## 2025-01-02 NOTE — PATIENT INSTRUCTIONS
Use over the counter(OTC) antihistamine like claritin or zyrtec daily.  Flonase: 2 sprays per nostril 1-2 times a day for congestion.  Atrovent nasal spray 2x a day for congestion.  Start antibiotics in 24-48 hours if no relief of ear pain or if you start to run fever.     Nasal saline drops: 2-4 drops per nostril 10-15 times a day.     Tylenol or Motrin for fever or pain per label instructions.  Consider use of OTC cough medicine like Robitussin DM.  Warm saltwater gargles to 3 times a day.    Rest and drink plenty of fluids.  Avoid OTC decongestants if you have high blood pressure. This includes multi-cold symptom preparations.    Consider permissive fever to allow your immune system to work.  However, if fever is not tolerable or is disrupting sleep, use Tylenol or Motrin per label instructions.    You are considered contagious until your systemic, or whole body, symptoms have resolved fully for 24 hours.  This includes fever, body aches, fatigue.    Follow up in 2-3 days with PCP if no improvement or any worsening.       Viral Upper Respiratory Infection Discharge Instructions, Adult   About this topic   You have an upper respiratory infection or URI. A URI can affect your nose, throat, ears, and sinuses. A virus is the cause of almost all URIs and antibiotics will not help you feel better more quickly. The common cold is an example of a viral URI.  URIs are easy to spread from person to person, most often through coughing or sneezing. A URI will almost always get better in a week or two without any treatment.         What care is needed at home?   Ask your doctor what you need to do when you go home. Make sure you ask questions if you do not understand what the doctor says.  If you smoke, try to quit. Your doctor or nurse can help.  Drink lots of fluids like water, juice, or broth. This will help replace any fluids lost if you have a runny nose or fever. Warm tea or soup can help soothe a sore throat.  If the  air in your home feels dry, use a cool mist humidifier. This can help a stuffy nose and make it easier to breathe.  You can also use saline nose drops to relieve stuffiness.  If you decide to take over-the-counter cough or cold medicines, follow the directions on the label carefully. Be sure you do not take more than 1 medicine that contains acetaminophen. Also, if you have a heart problem or high blood pressure, check with your doctor before you take any of these medicines.  Wash your hands often. Cough or sneeze into a tissue or your elbow instead of your hands. This will help keep others healthy.  What follow-up care is needed?   Your doctor may ask you to make visits to the office to check on your progress. Be sure to keep these visits.  What drugs may be needed?   The doctor may order drugs to:  Open up the tubes of your lungs  Treat viral infection  Relieve or stop coughing  Help with pain from a sore throat  Relieve runny and stuffy nose  Provide oxygen  Will physical activity be limited?   You need to rest for a few days to let your body recover from the infection.  What changes to diet are needed?   Eat soft foods like soup if swallowing is too painful.  What problems could happen?   Asthma attack  Sinus infections  Lung problems like pneumonia and bronchitis  Severe fluid loss. This is dehydration.  What can be done to prevent this health problem?   Wash your hands often with soap and water for at least 20 seconds, especially after coughing or sneezing. Alcohol-based hand sanitizers also work to kill the virus.  If you are sick, cover your mouth and nose with tissue when you cough or sneeze. You can also cough into your elbow. Throw away tissues in the trash and wash your hands after touching used tissues.  Do not get too close (kissing, hugging) to people who are sick.  Do not share towels or hankies with anyone who is sick. Clean commonly handled things like door handles, remotes, toys, and phones. Wipe  them with a disinfectant.  Stay away from crowded places.  Cover your nose and mouth when you sneeze or cough.  Take vitamin C to help build up your body's ability to fight disease.  Get a flu shot each year.  When do I need to call the doctor?   You have trouble breathing when talking or sitting still.  You have a fever of 100.4°F (38°C) or higher for several days, chills, a very bad sore throat, or ear or sinus pain.  You develop a new fever after several days of feeling the same or improving.  You develop chest pain when you cough.  You have a cough that lasts more than 10 days.  You cough up blood, or the color of the mucus you cough up changes.  Teach Back: Helping You Understand   The Teach Back Method helps you understand the information we are giving you. After you talk with the staff, tell them in your own words what you learned. This helps to make sure the staff has described each thing clearly. It also helps to explain things that may have been confusing. Before going home, make sure you can do these:  I can tell you about my condition.  I can tell you what may help ease my signs.  I can tell you what I will do if I have a fever, chills, breathing very fast, or trouble breathing.  Where can I learn more?   American Lung Association  https://www.lung.org/blog/can-you-exercise-with-a-cold   American Lung Association  https://www.lung.org/lung-health-diseases/lung-disease-lookup/influenza/facts-about-the-common-cold   NHS Choices  https://www.nhs.uk/conditions/respiratory-tract-infection/   UpToDate  https://www.uptodate.com/contents/the-common-cold-in-adults-beyond-the-basics   Last Reviewed Date   2021-06-08  Consumer Information Use and Disclaimer   This information is not specific medical advice and does not replace information you receive from your health care provider. This is only a brief summary of general information. It does NOT include all information about conditions, illnesses, injuries, tests,  procedures, treatments, therapies, discharge instructions or life-style choices that may apply to you. You must talk with your health care provider for complete information about your health and treatment options. This information should not be used to decide whether or not to accept your health care providers advice, instructions or recommendations. Only your health care provider has the knowledge and training to provide advice that is right for you.  Copyright   Copyright © 2021 [employer] and its affiliates and/or licensors. All rights reserved.

## 2025-01-02 NOTE — PROGRESS NOTES
"Subjective:      Patient ID: Niesha Abarca is a 44 y.o. female.    Vitals:  height is 5' 5" (1.651 m) and weight is 91.3 kg (201 lb 2.7 oz). Her tympanic temperature is 97.6 °F (36.4 °C). Her blood pressure is 140/79 (abnormal) and her pulse is 103. Her respiration is 16 and oxygen saturation is 100%.     Chief Complaint: Otalgia    Patient presents with left ear pain, bilateral ear congestion, runny nose, and post nasal drip.  Symptoms started 9 days ago. Sore throat x today.   Hx of Strep. Ciprofloxacin x today with no relief.  Onset of ear pain was today.  No fever with this    Otalgia   There is pain in the left ear. This is a new problem. Episode onset: 9 days ago. The problem occurs constantly. The problem has been gradually worsening. There has been no fever. The pain is at a severity of 6/10. The pain is moderate. Associated symptoms include coughing, headaches, neck pain, rhinorrhea and a sore throat. Pertinent negatives include no abdominal pain, diarrhea or vomiting. Associated symptoms comments: Nasal congestion. Treatments tried: Tylenol Sinus. The treatment provided no relief.       HENT:  Positive for ear pain and sore throat.    Neck: Positive for neck pain.   Respiratory:  Positive for cough.    Gastrointestinal:  Negative for abdominal pain, vomiting and diarrhea.   Neurological:  Positive for headaches.      Objective:     Physical Exam   Constitutional: She is oriented to person, place, and time. She appears well-developed. She is cooperative.  Non-toxic appearance. She does not appear ill. No distress.   HENT:   Head: Normocephalic and atraumatic.   Ears:   Right Ear: Hearing and external ear normal.   Left Ear: Hearing and external ear normal.      Comments: TM erythematous with purulent middle ear fluid  Nose: Congestion present. No mucosal edema, rhinorrhea or nasal deformity. No epistaxis. Right sinus exhibits no maxillary sinus tenderness and no frontal sinus tenderness. Left sinus " exhibits no maxillary sinus tenderness and no frontal sinus tenderness.   Mouth/Throat: Uvula is midline and mucous membranes are normal. No trismus in the jaw. Normal dentition. No uvula swelling. Posterior oropharyngeal erythema present. No oropharyngeal exudate or posterior oropharyngeal edema.   Eyes: Conjunctivae and lids are normal. No scleral icterus. Extraocular movement intact   Neck: Trachea normal and phonation normal. Neck supple. No edema present. No erythema present. No neck rigidity present.   Cardiovascular: Normal rate, regular rhythm, normal heart sounds and normal pulses.   Pulmonary/Chest: Effort normal and breath sounds normal. No respiratory distress. She has no decreased breath sounds. She has no rhonchi.   Abdominal: Normal appearance.   Musculoskeletal: Normal range of motion.         General: No deformity. Normal range of motion.   Neurological: She is alert and oriented to person, place, and time. She exhibits normal muscle tone. Coordination normal.   Skin: Skin is warm, dry, intact, not diaphoretic and not pale.   Psychiatric: Her speech is normal and behavior is normal. Judgment and thought content normal.   Nursing note and vitals reviewed.      Assessment:     1. Non-recurrent acute suppurative otitis media of left ear without spontaneous rupture of tympanic membrane    2. Sore throat    3. Left ear pain        Plan:       Non-recurrent acute suppurative otitis media of left ear without spontaneous rupture of tympanic membrane  -     ketorolac injection 30 mg  -     amoxicillin (AMOXIL) 500 MG capsule; Take 1 capsule (500 mg total) by mouth every 12 (twelve) hours. for 10 days  Dispense: 20 capsule; Refill: 0  -     ipratropium (ATROVENT) 21 mcg (0.03 %) nasal spray; 2 sprays by Each Nostril route 2 (two) times daily.  Dispense: 30 mL; Refill: 0    Sore throat  -     POCT Strep A, Molecular    Left ear pain          Medical Decision Making:   Initial Assessment:    Otalgia  Differential Diagnosis:   Pharyngitis, otitis media, otitis externa  Urgent Care Management:  Patient with left otitis media.  No fever at this time.  She would like to try to do some decongesting nasal sprays before starting antibiotics to see if she can get the ear to drain.  Agree with watchful waiting.  Patient given a paper prescription for Amoxil which she will fill in 24-48 hours if she runs fever or gets worse

## 2025-01-07 ENCOUNTER — OFFICE VISIT (OUTPATIENT)
Dept: OTOLARYNGOLOGY | Facility: CLINIC | Age: 45
End: 2025-01-07
Payer: COMMERCIAL

## 2025-01-07 ENCOUNTER — CLINICAL SUPPORT (OUTPATIENT)
Dept: AUDIOLOGY | Facility: CLINIC | Age: 45
End: 2025-01-07
Payer: COMMERCIAL

## 2025-01-07 VITALS — BODY MASS INDEX: 33.35 KG/M2 | WEIGHT: 200.38 LBS

## 2025-01-07 DIAGNOSIS — H69.93 DYSFUNCTION OF BOTH EUSTACHIAN TUBES: Primary | ICD-10-CM

## 2025-01-07 DIAGNOSIS — H65.192 ACUTE MEE (MIDDLE EAR EFFUSION), LEFT: Primary | ICD-10-CM

## 2025-01-07 PROCEDURE — 3008F BODY MASS INDEX DOCD: CPT | Mod: CPTII,S$GLB,, | Performed by: PHYSICIAN ASSISTANT

## 2025-01-07 PROCEDURE — 1159F MED LIST DOCD IN RCRD: CPT | Mod: CPTII,S$GLB,, | Performed by: PHYSICIAN ASSISTANT

## 2025-01-07 PROCEDURE — 99999 PR PBB SHADOW E&M-EST. PATIENT-LVL III: CPT | Mod: PBBFAC,,, | Performed by: PHYSICIAN ASSISTANT

## 2025-01-07 PROCEDURE — 92567 TYMPANOMETRY: CPT | Mod: S$GLB,,, | Performed by: AUDIOLOGIST

## 2025-01-07 PROCEDURE — 99203 OFFICE O/P NEW LOW 30 MIN: CPT | Mod: S$GLB,,, | Performed by: PHYSICIAN ASSISTANT

## 2025-01-07 RX ORDER — PREDNISONE 20 MG/1
TABLET ORAL
Qty: 21 TABLET | Refills: 0 | Status: SHIPPED | OUTPATIENT
Start: 2025-01-07

## 2025-01-07 NOTE — PROGRESS NOTES
Subjective:   Patient ID: Niesha Abarca is a 44 y.o. female.    Chief Complaint: Ear Fullness (Pt is coming in today for ear fullness in her left ear pt states that she went on Wednesday last week for an ear infection but she isn't in any pain any more her ear is just full and she can't hear)    Ms. Abarca is a very pleasant 43 yo female here to see me today with decreased hearing in left ear. Recently treated for OM with antibiotics. Pain has resolved but still with fullness and decreased hearing.       Review of patient's allergies indicates:   Allergen Reactions    Statins-hmg-coa reductase inhibitors      myalgia    Zetia [ezetimibe]      Myalgia           Review of Systems   Constitutional:  Negative for chills, fatigue, fever and unexpected weight change.   HENT:  Positive for hearing loss. Negative for congestion, dental problem, ear discharge, ear pain, facial swelling, nosebleeds, postnasal drip, rhinorrhea, sinus pressure, sneezing, sore throat, tinnitus, trouble swallowing and voice change.    Eyes:  Negative for redness, itching and visual disturbance.   Respiratory:  Negative for cough, choking, shortness of breath and wheezing.    Cardiovascular:  Negative for chest pain and palpitations.   Gastrointestinal:  Negative for abdominal pain.        No reflux.   Musculoskeletal:  Negative for gait problem.   Skin:  Negative for rash.   Neurological:  Negative for dizziness, light-headedness and headaches.         Objective:   Wt 90.9 kg (200 lb 6.4 oz)   LMP 12/16/2024 (Exact Date)   BMI 33.35 kg/m²     Physical Exam  Constitutional:       General: She is not in acute distress.     Appearance: She is well-developed.   HENT:      Head: Normocephalic and atraumatic.      Right Ear: Ear canal and external ear normal. Tympanic membrane is retracted.      Left Ear: Ear canal and external ear normal. A middle ear effusion is present. Tympanic membrane is retracted.      Nose: Nose normal. No nasal  deformity, septal deviation, mucosal edema or rhinorrhea.      Right Sinus: No maxillary sinus tenderness or frontal sinus tenderness.      Left Sinus: No maxillary sinus tenderness or frontal sinus tenderness.      Mouth/Throat:      Mouth: Mucous membranes are not pale and not dry.      Dentition: No dental caries.      Pharynx: Uvula midline. No oropharyngeal exudate or posterior oropharyngeal erythema.   Eyes:      General: Lids are normal. No scleral icterus.     Extraocular Movements:      Right eye: Normal extraocular motion and no nystagmus.      Left eye: Normal extraocular motion and no nystagmus.      Conjunctiva/sclera: Conjunctivae normal.      Right eye: Right conjunctiva is not injected. No chemosis.     Left eye: Left conjunctiva is not injected. No chemosis.     Pupils: Pupils are equal, round, and reactive to light.   Neck:      Thyroid: No thyroid mass or thyromegaly.      Trachea: Trachea and phonation normal. No tracheal tenderness or tracheal deviation.   Pulmonary:      Effort: Pulmonary effort is normal. No respiratory distress.      Breath sounds: No stridor.   Abdominal:      General: There is no distension.   Lymphadenopathy:      Head:      Right side of head: No submental, submandibular, preauricular, posterior auricular or occipital adenopathy.      Left side of head: No submental, submandibular, preauricular, posterior auricular or occipital adenopathy.      Cervical: No cervical adenopathy.   Skin:     General: Skin is warm and dry.      Findings: No erythema or rash.   Neurological:      Mental Status: She is alert and oriented to person, place, and time.      Cranial Nerves: No cranial nerve deficit.   Psychiatric:         Behavior: Behavior normal.          Tympanometry revealed Type C, abnormal in the right ear, and Type B, flat in the left ear.   Right Ear: 0.4 ml@ -275 daPa, with ear canal volume of: 0.9  Left Ear: no peak, with ear canal volume of: 0.8    Assessment:        Plan:     Acute MOE (middle ear effusion), left    Other orders  -     predniSONE (DELTASONE) 20 MG tablet; Take 3 tab in am x 3d, then 2 tab in am x 3d, then 1 tab in am x 3d, then 1/2 tab in am x 3d  Dispense: 21 tablet; Refill: 0      I have added oral steroids and dicussed continued use of nasal Flonase. I will see her back in 2 weeks or sooner if needed.

## 2025-01-07 NOTE — PROGRESS NOTES
Niesha Abarca was seen on 01/07/2025 for tympanometry at the request of ALIYAH Rojas C.    Tympanometry revealed Type C, abnormal in the right ear, and Type B, flat in the left ear.   Right Ear: 0.4 ml@ -275 daPa, with ear canal volume of: 0.9  Left Ear: no peak, with ear canal volume of: 0.8    Patient was counseled on above findings.    Recommendations:  ENT Review.

## 2025-01-09 ENCOUNTER — PATIENT MESSAGE (OUTPATIENT)
Dept: OTOLARYNGOLOGY | Facility: CLINIC | Age: 45
End: 2025-01-09
Payer: COMMERCIAL

## 2025-01-24 ENCOUNTER — HOSPITAL ENCOUNTER (OUTPATIENT)
Dept: CARDIOLOGY | Facility: HOSPITAL | Age: 45
Discharge: HOME | End: 2025-01-24
Attending: INTERNAL MEDICINE
Payer: COMMERCIAL

## 2025-01-24 ENCOUNTER — OFFICE VISIT (OUTPATIENT)
Dept: CARDIOLOGY | Facility: CLINIC | Age: 45
End: 2025-01-24
Payer: COMMERCIAL

## 2025-01-24 VITALS
OXYGEN SATURATION: 99 % | SYSTOLIC BLOOD PRESSURE: 125 MMHG | HEART RATE: 94 BPM | BODY MASS INDEX: 32.39 KG/M2 | DIASTOLIC BLOOD PRESSURE: 85 MMHG | WEIGHT: 194.69 LBS

## 2025-01-24 DIAGNOSIS — Z91.89 AT INCREASED RISK FOR CARDIOVASCULAR DISEASE: ICD-10-CM

## 2025-01-24 DIAGNOSIS — I20.9 ANGINA PECTORIS, UNSPECIFIED: ICD-10-CM

## 2025-01-24 DIAGNOSIS — K21.9 GASTROESOPHAGEAL REFLUX DISEASE, UNSPECIFIED WHETHER ESOPHAGITIS PRESENT: ICD-10-CM

## 2025-01-24 DIAGNOSIS — R07.9 CHEST PAIN, UNSPECIFIED TYPE: ICD-10-CM

## 2025-01-24 DIAGNOSIS — E66.9 OBESITY (BMI 30-39.9): ICD-10-CM

## 2025-01-24 DIAGNOSIS — I10 ESSENTIAL HYPERTENSION: Primary | ICD-10-CM

## 2025-01-24 DIAGNOSIS — E78.01 FAMILIAL HYPERCHOLESTEROLEMIA: ICD-10-CM

## 2025-01-24 DIAGNOSIS — I10 ESSENTIAL HYPERTENSION: ICD-10-CM

## 2025-01-24 DIAGNOSIS — K58.8 OTHER IRRITABLE BOWEL SYNDROME: ICD-10-CM

## 2025-01-24 DIAGNOSIS — E78.2 MIXED HYPERLIPIDEMIA: ICD-10-CM

## 2025-01-24 DIAGNOSIS — G47.33 OSA (OBSTRUCTIVE SLEEP APNEA): ICD-10-CM

## 2025-01-24 DIAGNOSIS — Z78.9 STATIN INTOLERANCE: Primary | ICD-10-CM

## 2025-01-24 DIAGNOSIS — Z82.49 FAMILY HISTORY OF EARLY CAD: ICD-10-CM

## 2025-01-24 DIAGNOSIS — Z86.16 HISTORY OF COVID-19: ICD-10-CM

## 2025-01-24 PROCEDURE — 1160F RVW MEDS BY RX/DR IN RCRD: CPT | Mod: CPTII,S$GLB,, | Performed by: INTERNAL MEDICINE

## 2025-01-24 PROCEDURE — 93010 ELECTROCARDIOGRAM REPORT: CPT | Mod: ,,, | Performed by: STUDENT IN AN ORGANIZED HEALTH CARE EDUCATION/TRAINING PROGRAM

## 2025-01-24 PROCEDURE — 93005 ELECTROCARDIOGRAM TRACING: CPT

## 2025-01-24 PROCEDURE — 99999 PR PBB SHADOW E&M-EST. PATIENT-LVL III: CPT | Mod: PBBFAC,,, | Performed by: INTERNAL MEDICINE

## 2025-01-24 PROCEDURE — 3079F DIAST BP 80-89 MM HG: CPT | Mod: CPTII,S$GLB,, | Performed by: INTERNAL MEDICINE

## 2025-01-24 PROCEDURE — G2211 COMPLEX E/M VISIT ADD ON: HCPCS | Mod: S$GLB,,, | Performed by: INTERNAL MEDICINE

## 2025-01-24 PROCEDURE — 3074F SYST BP LT 130 MM HG: CPT | Mod: CPTII,S$GLB,, | Performed by: INTERNAL MEDICINE

## 2025-01-24 PROCEDURE — 4010F ACE/ARB THERAPY RXD/TAKEN: CPT | Mod: CPTII,S$GLB,, | Performed by: INTERNAL MEDICINE

## 2025-01-24 PROCEDURE — 99214 OFFICE O/P EST MOD 30 MIN: CPT | Mod: S$GLB,,, | Performed by: INTERNAL MEDICINE

## 2025-01-24 PROCEDURE — 1159F MED LIST DOCD IN RCRD: CPT | Mod: CPTII,S$GLB,, | Performed by: INTERNAL MEDICINE

## 2025-01-24 PROCEDURE — 3008F BODY MASS INDEX DOCD: CPT | Mod: CPTII,S$GLB,, | Performed by: INTERNAL MEDICINE

## 2025-01-24 RX ORDER — AMLODIPINE BESYLATE 5 MG/1
5 TABLET ORAL DAILY
Qty: 180 TABLET | Refills: 2 | Status: SHIPPED | OUTPATIENT
Start: 2025-01-24

## 2025-01-24 RX ORDER — LOSARTAN POTASSIUM 50 MG/1
50 TABLET ORAL NIGHTLY
Qty: 90 TABLET | Refills: 3 | Status: SHIPPED | OUTPATIENT
Start: 2025-01-24 | End: 2026-01-24

## 2025-01-24 RX ORDER — TIRZEPATIDE 10 MG/.5ML
10 INJECTION, SOLUTION SUBCUTANEOUS
Qty: 4 PEN | Refills: 1 | Status: SHIPPED | OUTPATIENT
Start: 2025-01-24

## 2025-01-24 NOTE — PROGRESS NOTES
Subjective:   Patient ID:  Niesha Abarca is a 44 y.o. female who presents for cardiac consult of No chief complaint on file.      The patient came in today for cardiac consult of No chief complaint on file.      Referring Physician: Kuldip Bentley MD   Reason for initial consult: CVD Risk, HLD    Niesha Abarca is a 44 y.o. female pt with HTN, HLD, Pre-ecclamsia, insulin resistance,  obesity, FH CAD, prior smoker presents for follow up CV evaluation.    7/19/24  BP and HR stable. BMI 36 -- 221 lbs   She is losing weight now with compounding pharmacy [facility].     1/24/25  She had recent ENT eval for decreased hearing L side after an infection. Was given steroids and BP has increased.        Ref Range & Units 6 mo ago    Cholesterol <200 mg/dL 263 High    Triglycerides <150 mg/dL 90   HDL >60 mg/dL 41 Low    LDL Calculated 60 - 135 204 High    Hdl/Cholesterol Ratio 0.00 - 4.45 6.41 High    Non HDL Cholesterol mg/dL 222.00       Needs repeat lipids.   BP and HR stable. BMI 32 - 194 lbs   She has not started lipid meds yet. Has lost a lot more weight, will repeat lipids.     ECG - NSR       Is a teacher, 5th grade.     FH - early CAD - maternal GM    Sleep study  PHYSICIAN INTERPRETATION AND COMMENTS: Findings are consistent with mild, positional obstructive sleep apnea(CARMEN).  Overall AHI was 8.0/hr. SpO2 kishan was 85.4%. Therapy should be considered.  CLINICAL HISTORY: 40 year old female presented with: 16 inch neck, BMI of 40.1, an Lafayette sleepiness score of 3, history  of hypertension, diabetes and symptoms of nocturnal snoring. Based on the clinical history, the patient has a high pre-test  probability of having Moderate CARMEN. STOPBANG score 5. Lafayette score 5.      EKG Conclusions:    1. The EKG portion of this study is negative for ischemia at a moderate workload, and peak heart rate of 164 bpm (94% of predicted).   2. Exercise capacity is average.   3. Blood pressure response to exercise was  hypertensive (Presenting BP: 147/82 Peak BP: 234/60).   4. No significant arrhythmias were present.   5. There were no symptoms of chest discomfort or significant dyspnea throughout the protocol.   6. The Duke treadmill score was 7 suggesting a low probability for future cardiovascular events.    This document has been electronically    SIGNED BY: Bayron Rankin MD On: 2019 06:43    CONCLUSIONS     1 - No wall motion abnormalities.     2 - Normal left ventricular systolic function (EF 55-60%).     3 - Normal left ventricular diastolic function.     4 - Normal right ventricular systolic function .     5 - The estimated PA systolic pressure is 23 mmHg.       This document has been electronically    SIGNED BY: Hola Jerry MD On: 2019 15:16    Past Medical History:   Diagnosis Date    Anxiety     Essential hypertension 2019    Hyperlipidemia     IBS (irritable bowel syndrome)        Past Surgical History:   Procedure Laterality Date    APPENDECTOMY       SECTION      COLONOSCOPY N/A 2022    Procedure: COLONOSCOPY;  Surgeon: Tosin Yoo MD;  Location: [facility];  Service: Endoscopy;  Laterality: N/A;    REDUCTION OF BOTH BREASTS Bilateral 2023    TOTAL REDUCTION MAMMOPLASTY Bilateral     2023    WISDOM TOOTH EXTRACTION         Social History     Tobacco Use    Smoking status: Former     Current packs/day: 0.00     Average packs/day: 0.5 packs/day for 15.0 years (7.5 ttl pk-yrs)     Types: Cigarettes     Start date: 2003     Quit date: 2018     Years since quittin.7     Passive exposure: Never    Smokeless tobacco: Never   Substance Use Topics    Alcohol use: Not Currently    Drug use: No       Family History   Adopted: Yes   Problem Relation Name Age of Onset    No Known Problems Mother      No Known Problems Father      Ovarian cancer Paternal Grandmother      Breast cancer Paternal Grandmother         Patient's Medications   New Prescriptions    No  medications on file   Previous Medications    AMLODIPINE (NORVASC) 5 MG TABLET    Take 1 tablet (5 mg total) by mouth 2 (two) times a day.    BEMPEDOIC ACID-EZETIMIBE 180-10 MG TAB    Take 1 tablet by mouth every evening.    BUPROPION (WELLBUTRIN XL) 150 MG TB24 TABLET    Take 1 tablet (150 mg total) by mouth once daily.    BUSPIRONE (BUSPAR) 10 MG TABLET    Take 1 tablet (10 mg total) by mouth 2 (two) times daily.    CLOBETASOL 0.05% (TEMOVATE) 0.05 % OINT    Apply topically 2 (two) times daily. Use up to 2 weeks at a time    IBUPROFEN (ADVIL,MOTRIN) 600 MG TABLET        IPRATROPIUM (ATROVENT) 21 MCG (0.03 %) NASAL SPRAY    2 sprays by Each Nostril route 2 (two) times daily.    LOSARTAN (COZAAR) 50 MG TABLET    Take 1 tablet (50 mg total) by mouth every evening.    NORETHINDRONE (MICRONOR) 0.35 MG TABLET    Take 1 tablet by mouth.    PANTOPRAZOLE (PROTONIX) 40 MG TABLET    Take 1 tablet (40 mg total) by mouth once daily.    PREDNISONE (DELTASONE) 20 MG TABLET    Take 3 tab in am x 3d, then 2 tab in am x 3d, then 1 tab in am x 3d, then 1/2 tab in am x 3d    TIRZEPATIDE, WEIGHT LOSS, (ZEPBOUND) 2.5 MG/0.5 ML PNIJ    Inject 2.5 mg into the skin every 7 days.    TIRZEPATIDE, WEIGHT LOSS, (ZEPBOUND) 5 MG/0.5 ML PNIJ    Inject 5 mg into the skin every 7 days.    TRETINOIN MICROSPHERES (RETIN-A MICRO PUMP) 0.06 % GLWP    Apply 1 application  topically every evening.    VALACYCLOVIR (VALTREX) 1000 MG TABLET    Take 2 tablets (2,000 mg total) by mouth every 12 (twelve) hours.   Modified Medications    No medications on file   Discontinued Medications    No medications on file       Review of Systems   Constitutional:  Positive for malaise/fatigue.   HENT: Negative.     Eyes: Negative.    Respiratory: Negative.     Cardiovascular:  Positive for chest pain and leg swelling (rare).   Gastrointestinal: Negative.    Genitourinary: Negative.    Musculoskeletal: Negative.    Skin: Negative.    Neurological: Negative.     Endo/Heme/Allergies: Negative.    Psychiatric/Behavioral: Negative.     All 12 systems otherwise negative.      Wt Readings from Last 3 Encounters:   01/07/25 90.9 kg (200 lb 6.4 oz)   01/01/25 91.3 kg (201 lb 2.7 oz)   07/19/24 100.4 kg (221 lb 5.5 oz)     Temp Readings from Last 3 Encounters:   01/01/25 97.6 °F (36.4 °C) (Tympanic)   08/24/23 (!) 102.1 °F (38.9 °C) (Oral)   06/16/23 97.4 °F (36.3 °C) (Tympanic)     BP Readings from Last 3 Encounters:   01/01/25 (!) 140/79   07/19/24 136/80   04/15/24 132/84     Pulse Readings from Last 3 Encounters:   01/01/25 103   07/19/24 91   04/15/24 88       LMP 12/16/2024 (Exact Date)     Objective:   Physical Exam  Vitals and nursing note reviewed.   Constitutional:       General: She is not in acute distress.     Appearance: She is well-developed. She is obese. She is not diaphoretic.      Comments: Obese   HENT:      Head: Normocephalic and atraumatic.      Nose: Nose normal.   Eyes:      General: No scleral icterus.     Conjunctiva/sclera: Conjunctivae normal.   Neck:      Thyroid: No thyromegaly.      Vascular: No JVD.   Cardiovascular:      Rate and Rhythm: Normal rate and regular rhythm.      Heart sounds: S1 normal and S2 normal. No murmur heard.     No friction rub. No gallop. No S3 or S4 sounds.   Pulmonary:      Effort: Pulmonary effort is normal. No respiratory distress.      Breath sounds: Normal breath sounds. No stridor. No wheezing or rales.   Chest:      Chest wall: No tenderness.   Abdominal:      General: Bowel sounds are normal. There is no distension.      Palpations: Abdomen is soft. There is no mass.      Tenderness: There is no abdominal tenderness. There is no rebound.   Genitourinary:     Comments: Deferred  Musculoskeletal:         General: No tenderness or deformity. Normal range of motion.      Cervical back: Normal range of motion and neck supple.   Lymphadenopathy:      Cervical: No cervical adenopathy.   Skin:     General: Skin is warm and  dry.      Coloration: Skin is not pale.      Findings: No erythema or rash.   Neurological:      Mental Status: She is alert and oriented to person, place, and time.      Motor: No abnormal muscle tone.      Coordination: Coordination normal.   Psychiatric:         Behavior: Behavior normal.         Thought Content: Thought content normal.         Judgment: Judgment normal.         Lab Results   Component Value Date     (L) 06/16/2023    K 4.1 06/16/2023     06/16/2023    CO2 22 (L) 06/16/2023    BUN 13 06/16/2023    CREATININE 1.0 04/25/2024    GLU 80 06/16/2023    HGBA1C 4.8 07/11/2024    HGBA1C 5.4 10/04/2022    AST 14 06/16/2023    ALT 17 06/16/2023    ALBUMIN 3.7 06/16/2023    PROT 7.5 06/16/2023    BILITOT 0.3 06/16/2023    WBC 9.24 06/16/2023    HGB 12.1 06/16/2023    HCT 38.5 06/16/2023    MCV 85 06/16/2023     06/16/2023    TSH 1.28 07/11/2024    TSH 1.568 10/04/2022    CHOL 277 (H) 10/04/2022    HDL 51 10/04/2022    LDLCALC 209.0 (H) 10/04/2022    TRIG 85 10/04/2022     Assessment:      1. Statin intolerance    2. Familial hypercholesterolemia    3. Chest pain, unspecified type    4. Essential hypertension    5. Angina pectoris, unspecified    6. CARMEN (obstructive sleep apnea)    7. History of COVID-19    8. Gastroesophageal reflux disease, unspecified whether esophagitis present    9. At increased risk for cardiovascular disease    10. Other irritable bowel syndrome    11. Mixed hyperlipidemia    12. Obesity (BMI 30-39.9)    13. Family history of early CAD            Plan:   1. HTN - improved now  - cont meds  - changed benicar to losartan - increased to 50mg QHS, norvasc BID - Norvasc Daily   - low salt diet   - weight loss  - not on birth control    2. 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   - ordered Nexlizet - repeat labs in 3 months - has not started  yet     3. Insulin resistance - no longer based on recent A1c  - cont meds per PCP      4. H/o severe Obesity BMI 40 - 244 lbs.  --> BMI 41 - 249 lbs  BMI 38 - 233 lbs  -->  BMI 36 -- 221 lbs -->194 lbs   - need weight loss with diet/exercise    - refer to bariatric diet   - is on Zepbound will increase dose, - in on Tirzepatide now compounding - on 10mg   - will order Zepbound for CARMEN     5. Chest pain - atypical , occ  - stress test - negative 2019  - prior 2D ECHO - normal  - rare snoring/allergies     6. IBS  - cont tx per PCP  -neg Cscope     7. CARMEN, mild  - cont tx per pulm  - has been using Mouth guard    8. GERD - improved  - cont PPI, f/u GI  - has atypical CP  - may need EGD     9. H/O COVID 19  - stable    Visit today included increased complexity associated with the care of the episodic problem HLD addressed and managing the longitudinal care of the patient due to the serious and/or complex managed problem(s) .      Thank you for allowing me to participate in this patient's care. Please do not hesitate to contact me with any questions or concerns. Consult note has been forwarded to the referral physician.

## 2025-01-25 LAB
OHS QRS DURATION: 72 MS
OHS QTC CALCULATION: 428 MS

## 2025-01-28 ENCOUNTER — OFFICE VISIT (OUTPATIENT)
Dept: OTOLARYNGOLOGY | Facility: CLINIC | Age: 45
End: 2025-01-28
Payer: COMMERCIAL

## 2025-01-28 VITALS — WEIGHT: 197.75 LBS | BODY MASS INDEX: 32.91 KG/M2

## 2025-01-28 DIAGNOSIS — H69.92 ETD (EUSTACHIAN TUBE DYSFUNCTION), LEFT: Primary | ICD-10-CM

## 2025-01-28 PROCEDURE — 99213 OFFICE O/P EST LOW 20 MIN: CPT | Mod: S$GLB,,, | Performed by: PHYSICIAN ASSISTANT

## 2025-01-28 PROCEDURE — 99999 PR PBB SHADOW E&M-EST. PATIENT-LVL III: CPT | Mod: PBBFAC,,, | Performed by: PHYSICIAN ASSISTANT

## 2025-01-28 PROCEDURE — 1159F MED LIST DOCD IN RCRD: CPT | Mod: CPTII,S$GLB,, | Performed by: PHYSICIAN ASSISTANT

## 2025-01-28 PROCEDURE — 3008F BODY MASS INDEX DOCD: CPT | Mod: CPTII,S$GLB,, | Performed by: PHYSICIAN ASSISTANT

## 2025-01-28 PROCEDURE — 4010F ACE/ARB THERAPY RXD/TAKEN: CPT | Mod: CPTII,S$GLB,, | Performed by: PHYSICIAN ASSISTANT

## 2025-01-28 NOTE — PROGRESS NOTES
Subjective:   Patient ID: Niesha Abarca is a 44 y.o. female.    Chief Complaint: Follow-up (Pt is coming in today for a two week follow up with no concerns )    Ms. Abraca is a 43 yo female here to see me today for follow up of Left  MOE. She was started on oral steroid but was unable to tolerated higher dose. She was able to tolerate lower dose. She is feeling better but not 100% back to normal.       Review of patient's allergies indicates:   Allergen Reactions    Statins-hmg-coa reductase inhibitors      myalgia    Zetia [ezetimibe]      Myalgia           Review of Systems   Constitutional:  Negative for chills, fatigue, fever and unexpected weight change.   HENT:  Negative for congestion, dental problem, ear discharge, ear pain, facial swelling, hearing loss, nosebleeds, postnasal drip, rhinorrhea, sinus pressure, sneezing, sore throat, tinnitus, trouble swallowing and voice change.         Left ear pressure   Eyes:  Negative for redness, itching and visual disturbance.   Respiratory:  Negative for cough, choking, shortness of breath and wheezing.    Cardiovascular:  Negative for chest pain and palpitations.   Gastrointestinal:  Negative for abdominal pain.        No reflux.   Musculoskeletal:  Negative for gait problem.   Skin:  Negative for rash.   Neurological:  Negative for dizziness, light-headedness and headaches.         Objective:   Wt 89.7 kg (197 lb 12 oz)   LMP 12/16/2024 (Exact Date)   BMI 32.91 kg/m²     Physical Exam  Constitutional:       General: She is not in acute distress.     Appearance: She is well-developed.   HENT:      Head: Normocephalic and atraumatic.      Right Ear: Tympanic membrane, ear canal and external ear normal.      Left Ear: Ear canal and external ear normal. Tympanic membrane is retracted (severe).      Nose: Nose normal. No nasal deformity, septal deviation, mucosal edema or rhinorrhea.      Right Sinus: No maxillary sinus tenderness or frontal sinus tenderness.       Left Sinus: No maxillary sinus tenderness or frontal sinus tenderness.      Mouth/Throat:      Mouth: Mucous membranes are not pale and not dry.      Dentition: No dental caries.      Pharynx: Uvula midline. No oropharyngeal exudate or posterior oropharyngeal erythema.   Eyes:      General: Lids are normal. No scleral icterus.     Extraocular Movements:      Right eye: Normal extraocular motion and no nystagmus.      Left eye: Normal extraocular motion and no nystagmus.      Conjunctiva/sclera: Conjunctivae normal.      Right eye: Right conjunctiva is not injected. No chemosis.     Left eye: Left conjunctiva is not injected. No chemosis.     Pupils: Pupils are equal, round, and reactive to light.   Neck:      Thyroid: No thyroid mass or thyromegaly.      Trachea: Trachea and phonation normal. No tracheal tenderness or tracheal deviation.   Pulmonary:      Effort: Pulmonary effort is normal. No respiratory distress.      Breath sounds: No stridor.   Abdominal:      General: There is no distension.   Lymphadenopathy:      Head:      Right side of head: No submental, submandibular, preauricular, posterior auricular or occipital adenopathy.      Left side of head: No submental, submandibular, preauricular, posterior auricular or occipital adenopathy.      Cervical: No cervical adenopathy.   Skin:     General: Skin is warm and dry.      Findings: No erythema or rash.   Neurological:      Mental Status: She is alert and oriented to person, place, and time.      Cranial Nerves: No cranial nerve deficit.   Psychiatric:         Behavior: Behavior normal.          Imaging : Left ECV 1.0 0.2 ml@ -200dapa    Assessment:     1. ETD (Eustachian tube dysfunction), left        Plan:     ETD (Eustachian tube dysfunction), left      We had a long discussion regarding the anatomy and function of the eustachian tube.  We discussed that the eustachian tube acts as a pump to keep the appropriate amount of pressure behind the ear  drum.  I gave the patient a prescription for a nasal steroid spray to be used on a daily basis, and we discussed that it will take 2-3 weeks of daily use to achieve maximal effectiveness.     Effusion resolved but now with severe ETD. I will see her back in 2 weeks for recheck.      Pt. complains of head ache and nose pain and swelling. 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 Pt. denies any LOC

## 2025-01-29 ENCOUNTER — PATIENT MESSAGE (OUTPATIENT)
Dept: CARDIOLOGY | Facility: CLINIC | Age: 45
End: 2025-01-29
Payer: COMMERCIAL

## 2025-02-25 ENCOUNTER — LAB VISIT (OUTPATIENT)
Dept: LAB | Facility: HOSPITAL | Age: 45
End: 2025-02-25
Attending: INTERNAL MEDICINE
Payer: COMMERCIAL

## 2025-02-25 DIAGNOSIS — Z78.9 STATIN INTOLERANCE: ICD-10-CM

## 2025-02-25 DIAGNOSIS — E78.01 FAMILIAL HYPERCHOLESTEROLEMIA: ICD-10-CM

## 2025-02-25 LAB
CHOLEST SERPL-MCNC: 242 MG/DL (ref 120–199)
CHOLEST/HDLC SERPL: 5.5 {RATIO} (ref 2–5)
HDLC SERPL-MCNC: 44 MG/DL (ref 40–75)
HDLC SERPL: 18.2 % (ref 20–50)
LDLC SERPL CALC-MCNC: 184.2 MG/DL (ref 63–159)
NONHDLC SERPL-MCNC: 198 MG/DL
TRIGL SERPL-MCNC: 69 MG/DL (ref 30–150)

## 2025-02-25 PROCEDURE — 36415 COLL VENOUS BLD VENIPUNCTURE: CPT | Mod: PO | Performed by: INTERNAL MEDICINE

## 2025-02-25 PROCEDURE — 80061 LIPID PANEL: CPT | Performed by: INTERNAL MEDICINE

## 2025-02-26 ENCOUNTER — RESULTS FOLLOW-UP (OUTPATIENT)
Dept: CARDIOLOGY | Facility: CLINIC | Age: 45
End: 2025-02-26

## 2025-03-03 ENCOUNTER — OFFICE VISIT (OUTPATIENT)
Dept: SURGERY | Facility: CLINIC | Age: 45
End: 2025-03-03
Payer: COMMERCIAL

## 2025-03-03 VITALS
SYSTOLIC BLOOD PRESSURE: 116 MMHG | DIASTOLIC BLOOD PRESSURE: 77 MMHG | HEIGHT: 65 IN | WEIGHT: 192.69 LBS | BODY MASS INDEX: 32.1 KG/M2 | HEART RATE: 87 BPM

## 2025-03-03 DIAGNOSIS — Z12.39 ENCOUNTER FOR SCREENING FOR MALIGNANT NEOPLASM OF BREAST, UNSPECIFIED SCREENING MODALITY: Primary | ICD-10-CM

## 2025-03-03 PROCEDURE — 3008F BODY MASS INDEX DOCD: CPT | Mod: CPTII,S$GLB,, | Performed by: SURGERY

## 2025-03-03 PROCEDURE — 3078F DIAST BP <80 MM HG: CPT | Mod: CPTII,S$GLB,, | Performed by: SURGERY

## 2025-03-03 PROCEDURE — 3074F SYST BP LT 130 MM HG: CPT | Mod: CPTII,S$GLB,, | Performed by: SURGERY

## 2025-03-03 PROCEDURE — 99214 OFFICE O/P EST MOD 30 MIN: CPT | Mod: S$GLB,,, | Performed by: SURGERY

## 2025-03-03 PROCEDURE — 1160F RVW MEDS BY RX/DR IN RCRD: CPT | Mod: CPTII,S$GLB,, | Performed by: SURGERY

## 2025-03-03 PROCEDURE — 99999 PR PBB SHADOW E&M-EST. PATIENT-LVL III: CPT | Mod: PBBFAC,,, | Performed by: SURGERY

## 2025-03-03 PROCEDURE — 4010F ACE/ARB THERAPY RXD/TAKEN: CPT | Mod: CPTII,S$GLB,, | Performed by: SURGERY

## 2025-03-03 PROCEDURE — 1159F MED LIST DOCD IN RCRD: CPT | Mod: CPTII,S$GLB,, | Performed by: SURGERY

## 2025-03-03 NOTE — PROGRESS NOTES
Breast Surgery  Department of General Surgery   [facility]    Date of Service: 2025    DIAGNOSIS:   This is a 44 y.o. female with lifetime risk of breast cancer >20%. F/u high risk clinic- 23.43%  Genetic testing: none -   2023 pt had andrew breast reduction    HISTORY OF PRESENT ILLNESS:   Niesha Abarca is a 44 y.o. female comes in for high-risk follow up.     In the interim She denies change in her breast self-exam specifically denying new masses, skin or nipple changes, or nipple discharge. Denies constitutional symptoms of night sweats, weight loss, new headaches, visual changes, new back or bony pain, chest pain, or shortness of breath    Past medical and surgical history is updated     Pt had bilateral breast reduction 2023; she has done well since his surgery and reports only slight change in sensation    There have been no changes to family history. .      Her breast cancer risk factor profile is as follows: Menarche at 13, Menopause at N/A.  She is . Age at first live birth was 38. HRT: no She did not breastfeed.      Other breast cancer risk factors include family hx on father's side, menarche before age 12, family hx of maternal grandfather Prostate CA, and family hx of melanoma mother. Paternal grandmother breast cancer- ?, paternal great grandmother ovarian cancer ? age    IMAGING:     Result:   Mammo Digital Screening Bilat w/ Chuy     History:  Patient is 44 y.o. and is seen for annual.     Films Compared:  Prior images (if available) were compared.     Findings:  This procedure was performed using tomosynthesis. Computer-aided detection was utilized in the interpretation of this examination.        There are scattered areas of fibroglandular density. There is no evidence of suspicious masses, calcifications, or other abnormal findings.     Impression:  Bilateral  There is no mammographic evidence of malignancy.     BI-RADS Category:   Overall: 2 - Benign         Recommendation:  Routine screening mammogram in 1 year is recommended.           Your estimated lifetime risk of breast cancer (to age 85) based on Tyrer-Cuzick risk assessment model is 19.21%.  According to the American Cancer Society, patients with a lifetime breast cancer risk of 20% or higher might benefit from supplemental screening tests, such as screening breast MRI.     Review of Systems   Constitutional: Negative.    HENT: Negative.    Eyes: Negative.    Respiratory: Negative.    Cardiovascular: Negative.    Gastrointestinal: Negative.    Endocrine: Negative.    Genitourinary: Negative.    Musculoskeletal: Negative.    Integumentary:  Negative.   Allergic/Immunologic: Negative.    Neurological: Negative.    Hematological: Negative.  Negative for adenopathy.   Psychiatric/Behavioral: Negative.    Breast: denies breast mass, pain or discharge        PHYSICAL EXAM:     Chaperone present for exam Miesha Bonilla MA    General: The patient appears well and is in no acute distress.   BREAST EXAM  No Asymmetry; well-healed surgical scars bilaterally from reduction surgery  Right:  - Mass: No  - Skin change: No  - Nipple Discharge: No  - Nipple retraction: No  - Axillary LAD: No  Left:   - Mass: No  - Skin change: No  - Nipple Discharge: No  - Nipple retraction: No  - Axillary LAD: No    ASSESSMENT:   This is a 44 y.o. female at high risk of breast cancer     PLAN:   Niesha Abarca has a normal breast exam today.   Continue self breast awareness. Reviewed what to look for on exam. We discussed the possible signs and symptoms of breast cancer as lump, masses, new asymmetries, skin changes and nipple changes. She is encouraged to contact me if any new breast concerns arise.  Her Tyrer Cuzik score has decreased to below 20%  Mammogram in November 1 year from previous  Return to clinic in 6 months for bilateral breast exam        Mitch Silva

## 2025-03-27 ENCOUNTER — PROCEDURE VISIT (OUTPATIENT)
Dept: DERMATOLOGY | Facility: CLINIC | Age: 45
End: 2025-03-27
Payer: COMMERCIAL

## 2025-03-27 DIAGNOSIS — L98.8 RHYTIDES: Primary | ICD-10-CM

## 2025-03-27 NOTE — PATIENT INSTRUCTIONS
Post-operative care after Dysport injections    Here are some general guidelines to follow:    1. Avoid touching the treated area: Refrain from touching or rubbing the injected area for at least 24 hours to prevent the spread of the Dysport solution to unintended areas.    2. Stay upright: Maintain an upright position for the first few hours after the procedure. This helps ensure that the Dysport stays in the intended treatment area.    3. Avoid strenuous activities: Refrain from engaging in vigorous exercise, heavy lifting, or any other strenuous activities for the first 24 hours following the injection. These activities can potentially increase blood flow to the treated area and affect the distribution of Dysport.    4. Avoid excessive heat and sun exposure: Stay away from hot showers, saunas, steam rooms, or any other activities that may cause excessive heat or sweating for the first 24 hours. Additionally, protect the treated area from direct sunlight by wearing a wide-brimmed hat or using sunscreen with a high SPF.    5. Be cautious with makeup: Avoid applying makeup to the treated area immediately after the procedure. However, if necessary, you can gently apply makeup after a few hours.    6. Be patient: It may take several days for the full effects of Dysport to become apparent. Results vary from person to person, but typically improvements in the appearance of wrinkles and lines become noticeable within a week or two.

## 2025-03-27 NOTE — PROGRESS NOTES
Patient is here today for cosmetic dysport reatment.   She denies any new medical problems or medications.   She denies any history of adverse reaction to dysport or history of neuromuscular disease.     Discussed benefits and risks of dysport injections, including headache, weakness/paralysis of muscles, asymmetry, eyebrow/lid drooping, pain, bruising, swelling, infection, and rare risk of systemic botulism. Verbal and written consent was obtained.    Patient agreed to proceed with treatment. 41 units total were injected today:  6 in frontalis  23 in glabella (2-7-5-7-2)  6 in each lateral periorbital region      Patient tolerated well with no complications. She was instructed not to rub or massage the treated areas and that she should avoid lying down, bending upside down, and strenuous exercise for the rest of the day.    Lot #: [id number]  Exp date: 06/30/2026

## 2025-06-01 NOTE — PROGRESS NOTES
Please see completed report in cardiology procedures.   decreased ability to use arms for pushing/pulling/decreased ability to use legs for bridging/pushing

## 2025-06-30 ENCOUNTER — PATIENT MESSAGE (OUTPATIENT)
Dept: DERMATOLOGY | Facility: CLINIC | Age: 45
End: 2025-06-30
Payer: COMMERCIAL

## 2025-07-24 ENCOUNTER — OFFICE VISIT (OUTPATIENT)
Dept: DERMATOLOGY | Facility: CLINIC | Age: 45
End: 2025-07-24
Payer: COMMERCIAL

## 2025-07-24 DIAGNOSIS — D22.9 MULTIPLE BENIGN NEVI: ICD-10-CM

## 2025-07-24 DIAGNOSIS — Z12.83 SKIN CANCER SCREENING: Primary | ICD-10-CM

## 2025-07-24 DIAGNOSIS — D18.00 HEMANGIOMA, UNSPECIFIED SITE: ICD-10-CM

## 2025-07-24 DIAGNOSIS — L81.4 SOLAR LENTIGO: ICD-10-CM

## 2025-07-24 DIAGNOSIS — L82.1 SEBORRHEIC KERATOSIS: ICD-10-CM

## 2025-07-24 PROCEDURE — 99999 PR PBB SHADOW E&M-EST. PATIENT-LVL III: CPT | Mod: PBBFAC,,, | Performed by: STUDENT IN AN ORGANIZED HEALTH CARE EDUCATION/TRAINING PROGRAM

## 2025-07-24 NOTE — PROGRESS NOTES
Subjective:       Patient ID:  Niesha Abarca is a 44 y.o. female who presents for   Chief Complaint   Patient presents with    Skin Check     FBSE     HPI     Denies personal hx of melanoma or NMSC. +family hx of melanoma (biological mother). Patient here for skin check.    Review of Systems   Skin:  Positive for daily sunscreen use. Negative for itching, rash and recent sunburn.   Hematologic/Lymphatic: Does not bruise/bleed easily.        Objective:    Physical Exam   Constitutional: She appears well-developed and well-nourished. No distress.   Neurological: She is alert and oriented to person, place, and time. She is not disoriented.   Psychiatric: She has a normal mood and affect.   Skin:   Areas Examined (abnormalities noted in diagram):   Scalp / Hair Palpated and Inspected  Head / Face Inspection Performed  Neck Inspection Performed  Chest / Axilla Inspection Performed  Abdomen Inspection Performed  Genitals / Buttocks / Groin Inspection Performed  Back Inspection Performed  RUE Inspected  LUE Inspection Performed  RLE Inspected  LLE Inspection Performed                       Diagram Legend     Erythematous scaling macule/papule c/w actinic keratosis       Vascular papule c/w angioma      Pigmented verrucoid papule/plaque c/w seborrheic keratosis      Yellow umbilicated papule c/w sebaceous hyperplasia      Irregularly shaped tan macule c/w lentigo     1-2 mm smooth white papules consistent with Milia      Movable subcutaneous cyst with punctum c/w epidermal inclusion cyst      Subcutaneous movable cyst c/w pilar cyst      Firm pink to brown papule c/w dermatofibroma      Pedunculated fleshy papule(s) c/w skin tag(s)      Evenly pigmented macule c/w junctional nevus     Mildly variegated pigmented, slightly irregular-bordered macule c/w mildly atypical nevus      Flesh colored to evenly pigmented papule c/w intradermal nevus       Pink pearly papule/plaque c/w basal cell carcinoma      Erythematous  hyperkeratotic cursted plaque c/w SCC      Surgical scar with no sign of skin cancer recurrence      Open and closed comedones      Inflammatory papules and pustules      Verrucoid papule consistent consistent with wart     Erythematous eczematous patches and plaques     Dystrophic onycholytic nail with subungual debris c/w onychomycosis     Umbilicated papule    Erythematous-base heme-crusted tan verrucoid plaque consistent with inflamed seborrheic keratosis     Erythematous Silvery Scaling Plaque c/w Psoriasis     See annotation      Assessment / Plan:        Skin cancer screening  Total body skin examination performed today including at least 12 points as noted in physical examination. No lesions suspicious for malignancy noted.    Recommend daily sun protection/avoidance, use of at least SPF 30, broad spectrum sunscreen (OTC drug), skin self examinations, and routine physician surveillance to optimize early detection    Multiple benign nevi  Discussed ABCDE's of nevi.  Monitor for new mole or moles that are becoming bigger, darker, irritated, or developing irregular borders. Brochure provided. Instructed patient to observe lesion(s) for changes and follow up in clinic if changes are noted. Patient to monitor skin at home for new or changing lesions.     Hemangioma, unspecified site  This is a benign vascular lesion. Reassurance given. No treatment required.     Seborrheic keratosis  These are benign inherited growths without a malignant potential. Reassurance given to patient. No treatment is necessary.     Solar lentigo  This is a benign hyperpigmented sun induced lesion. Recommend daily sun protection/avoidance and use of at least SPF 30, broad spectrum sunscreen (OTC drug) will reduce the number of new lesions. Treatment of these benign lesions are considered cosmetic.          LOS NUMBER AND COMPLEXITY OF PROBLEMS    COMPLEXITY OF DATA RISK TOTAL TIME (m)   21447  39034 [] 1 self-limited or minor problem [x]  Minimal to none [] No treatment recommended or patient to monitor 15-29  10-19   86505  [id number] Low  [] 2 or > self limited or minor problems  [] 1 stable chronic illness  [] 1 acute, uncomplicated illness or injury Limited (2)  [] Prior external notes from each unique source  [] Review result of each unique test  [] Order each unique test [x]  Low  OTC medications, minor skin biopsy 30-44  20-29   27944  01469 Moderate  []  1 or > chronic illness with progression, exacerbation or SE of treatment  [x]  2 or more stable chronic illnesses  []  1 acute illness with systemic symptoms  []  1 acute complicated injury  []  1 undiagnosed new problem with uncertain prognosis Moderate (1/3 below)  []  3 or more data items        *Now includes assessment requiring independent historian  []  Independent interpretation of a test  []  Discuss management/test with another provider Moderate  []  Prescription drug mgmt  []  Minor surgery with risk discussed  []  Mgmt limited by social determinates 45-59  30-39   85722  09768 High  []  1 or more chronic illness with severe exacerbation, progression or SE of treatment  []  1 acute or chronic illness/injury that poses a threat to life or bodily function Extensive (2/3 below)  []  3 or more data items        *Now includes assessment requiring independent historian.  []  Independent interpretation of a test  []  Discuss management/test with another provider High  []  Major surgery with risk discussed  []  Drug therapy requiring intensive monitoring for toxicity  []  Hospitalization  []  Decision for DNR 60-74  40-54

## 2025-07-29 ENCOUNTER — PROCEDURE VISIT (OUTPATIENT)
Dept: DERMATOLOGY | Facility: CLINIC | Age: 45
End: 2025-07-29
Payer: COMMERCIAL

## 2025-07-29 DIAGNOSIS — L98.8 RHYTIDES: ICD-10-CM

## 2025-07-29 DIAGNOSIS — B00.9 HSV INFECTION: Primary | ICD-10-CM

## 2025-07-29 RX ORDER — VALACYCLOVIR HYDROCHLORIDE 1 G/1
TABLET, FILM COATED ORAL
Qty: 30 TABLET | Refills: 3 | Status: SHIPPED | OUTPATIENT
Start: 2025-07-29

## 2025-07-29 NOTE — PROGRESS NOTES
Patient is here today for cosmetic dysport reatment.   She denies any new medical problems or medications.   She denies any history of adverse reaction to dysport or history of neuromuscular disease.     Discussed benefits and risks of dysport injections, including headache, weakness/paralysis of muscles, asymmetry, eyebrow/lid drooping, pain, bruising, swelling, infection, and rare risk of systemic botulism. Verbal and written consent was obtained.    Patient agreed to proceed with treatment. 43 units total were injected today:  6 in frontalis  23 in glabella (2-7-5-7-2)  6 in each lateral periorbital region  2 units on upper lip      Patient tolerated well with no complications. She was instructed not to rub or massage the treated areas and that she should avoid lying down, bending upside down, and strenuous exercise for the rest of the day.    Lot #: [id number]  Exp date: 07/31/2026

## 2025-08-07 ENCOUNTER — TELEPHONE (OUTPATIENT)
Dept: CARDIOLOGY | Facility: CLINIC | Age: 45
End: 2025-08-07
Payer: COMMERCIAL

## 2025-08-07 NOTE — TELEPHONE ENCOUNTER
Attempted to reach patient to reschedule appt with Dr. Tirado on 09- d/t incorrect time slot. LVM and will send message via her [employer].